# Patient Record
Sex: MALE | Race: WHITE | NOT HISPANIC OR LATINO | Employment: OTHER | URBAN - METROPOLITAN AREA
[De-identification: names, ages, dates, MRNs, and addresses within clinical notes are randomized per-mention and may not be internally consistent; named-entity substitution may affect disease eponyms.]

---

## 2017-01-11 ENCOUNTER — TELEPHONE (OUTPATIENT)
Dept: INTERNAL MEDICINE | Facility: CLINIC | Age: 72
End: 2017-01-11

## 2017-01-11 RX ORDER — MUPIROCIN 20 MG/G
OINTMENT TOPICAL 2 TIMES DAILY
Qty: 60 G | Refills: 0 | Status: SHIPPED | OUTPATIENT
Start: 2017-01-11 | End: 2017-05-16 | Stop reason: SDUPTHER

## 2017-01-11 RX ORDER — MUPIROCIN 20 MG/G
OINTMENT TOPICAL
Qty: 22 G | Refills: 0 | Status: SHIPPED | OUTPATIENT
Start: 2017-01-11 | End: 2017-05-23

## 2017-01-11 NOTE — TELEPHONE ENCOUNTER
----- Message from Coy Fuller sent at 1/11/2017  3:33 PM CST -----  Contact: Sari Wife [phone]  Type: Rx    Name of medication(s): mupirocin (BACTROBAN) 2 % ointment    Is this a refill? New rx? Refill    Who prescribed medication? Dr Hood    Pharmacy Name, Phone, & Location: [facility]     Comments:Sari stated that the above pt had a fall and have some pretty bad scrapes on him, please advice

## 2017-01-12 ENCOUNTER — OFFICE VISIT (OUTPATIENT)
Dept: INTERNAL MEDICINE | Facility: CLINIC | Age: 72
End: 2017-01-12
Payer: MEDICARE

## 2017-01-12 VITALS
HEART RATE: 76 BPM | BODY MASS INDEX: 42.66 KG/M2 | WEIGHT: 315 LBS | SYSTOLIC BLOOD PRESSURE: 152 MMHG | OXYGEN SATURATION: 95 % | HEIGHT: 72 IN | TEMPERATURE: 98 F | DIASTOLIC BLOOD PRESSURE: 82 MMHG

## 2017-01-12 DIAGNOSIS — L03.116 CELLULITIS OF LEFT LOWER EXTREMITY: Primary | ICD-10-CM

## 2017-01-12 DIAGNOSIS — I87.2 CHRONIC VENOUS INSUFFICIENCY: ICD-10-CM

## 2017-01-12 DIAGNOSIS — I10 ESSENTIAL HYPERTENSION: ICD-10-CM

## 2017-01-12 PROCEDURE — 1159F MED LIST DOCD IN RCRD: CPT | Mod: S$GLB,,, | Performed by: INTERNAL MEDICINE

## 2017-01-12 PROCEDURE — 99999 PR PBB SHADOW E&M-EST. PATIENT-LVL III: CPT | Mod: PBBFAC,,, | Performed by: INTERNAL MEDICINE

## 2017-01-12 PROCEDURE — 99499 UNLISTED E&M SERVICE: CPT | Mod: S$GLB,,, | Performed by: INTERNAL MEDICINE

## 2017-01-12 PROCEDURE — 3079F DIAST BP 80-89 MM HG: CPT | Mod: S$GLB,,, | Performed by: INTERNAL MEDICINE

## 2017-01-12 PROCEDURE — 3077F SYST BP >= 140 MM HG: CPT | Mod: S$GLB,,, | Performed by: INTERNAL MEDICINE

## 2017-01-12 PROCEDURE — 99214 OFFICE O/P EST MOD 30 MIN: CPT | Mod: 25,S$GLB,, | Performed by: INTERNAL MEDICINE

## 2017-01-12 PROCEDURE — 1125F AMNT PAIN NOTED PAIN PRSNT: CPT | Mod: S$GLB,,, | Performed by: INTERNAL MEDICINE

## 2017-01-12 PROCEDURE — 96372 THER/PROPH/DIAG INJ SC/IM: CPT | Mod: S$GLB,,, | Performed by: INTERNAL MEDICINE

## 2017-01-12 PROCEDURE — 1157F ADVNC CARE PLAN IN RCRD: CPT | Mod: S$GLB,,, | Performed by: INTERNAL MEDICINE

## 2017-01-12 PROCEDURE — 1160F RVW MEDS BY RX/DR IN RCRD: CPT | Mod: S$GLB,,, | Performed by: INTERNAL MEDICINE

## 2017-01-12 RX ORDER — CEFTRIAXONE 1 G/1
1 INJECTION, POWDER, FOR SOLUTION INTRAMUSCULAR; INTRAVENOUS
Status: COMPLETED | OUTPATIENT
Start: 2017-01-12 | End: 2017-01-12

## 2017-01-12 RX ORDER — FUROSEMIDE 40 MG/1
40 TABLET ORAL DAILY
Qty: 7 TABLET | Refills: 0 | Status: SHIPPED | OUTPATIENT
Start: 2017-01-12 | End: 2017-03-13

## 2017-01-12 RX ADMIN — CEFTRIAXONE 1 G: 1 INJECTION, POWDER, FOR SOLUTION INTRAMUSCULAR; INTRAVENOUS at 10:01

## 2017-01-12 NOTE — PROGRESS NOTES
Verified patients name and date of birth, verified patient allergies, instructed patient to remain in building for 15 min, patient acknowledged understanding of instructions, patient tolerated injection well.

## 2017-01-12 NOTE — PROGRESS NOTES
Patient, Jake Louie (MRN #[id number]), presented with a recorded BMI of 43.29 kg/m^2 consistent with the definition of morbid obesity (ICD-10 E66.01). The patient's morbid obesity was monitored, evaluated, addressed and/or treated. This addendum to the medical record is made on 01/12/2017.

## 2017-01-12 NOTE — PROGRESS NOTES
REASON FOR VISIT:  This is a 71-year-old male who two days ago dropped his keys,   bent down, lost his balance, and landed on his left side which caused an   abrasion involving his left leg.  He is now seeing more swelling involving the   left leg with redness.  He has a history of chronic venous insufficiency of the   legs with recurrent edema and cellulitis.  He has responded well to additional   diuretics or antibiotics.    PAST MEDICAL HISTORY:  Hypertension.  Complex sleep apnea.  Hyperlipidemia.  Chronic insomnia.  Chronic venous insufficiency with reflux.    SOCIAL HISTORY:  Tobacco use, none.    MEDICATIONS:  List as per Preisbockd.  In reference to the last visit, he stopped   trazodone because it was not effective.    PHYSICAL EXAMINATION:  VITAL SIGNS:  Weight is 319, pulse 72, blood pressure by me 152/82.  He did not   take any medicines this morning.  EXTREMITIES:  He has 2+ pedal pulses in the right leg.  He has chronic 1+ edema.    On the left leg there is a surgical scar; he had a previous fracture.  He has   3+ edema.  There are about 3 abrasions on the lateral upper part of his lower   leg with surrounding erythema and there is one in the anterior aspect with   surrounding erythema.  There are also blisterous clear vesicles developing.    IMPRESSION:  1.  Cellulitis.  2.  Chronic venous insufficiency with acute edema.    PLAN:  Rocephin 1 g IM; in the past this was very effective in knocking it out   right away, but in addition, we will give a prescription of Lasix 40 mg once a   day for the next week.    /sc [id number] review      JAM/SILVIO  dd: 01/12/2017 09:53:37 (CST)  td: 01/13/2017 03:55:53 (CST)  Doc ID   #[id number]  Job ID #[id number]    CC:

## 2017-01-12 NOTE — MR AVS SNAPSHOT
[facility] [address]  [address]  [address]  [facility] [address]  Phone: [phone]                  Jake Louie   2017 9:30 AM   Office Visit    Description:  Male : 1945   Provider:  Artem Hood MD   Department:  [facility] [address]           Reason for Visit     Wound Infection           Diagnoses this Visit        Comments    Cellulitis of left lower extremity    -  Primary     Chronic venous insufficiency         Essential hypertension                To Do List           Future Appointments        Provider Department Dept Phone    2017 8:30 AM LAB, [place] [facility] [phone]    6/15/2017 8:40 AM Harpreet Rosario MD [facility] [place] [facility] [phone]      Goals (5 Years of Data)     None       These Medications        Disp Refills Start End    furosemide (LASIX) 40 MG tablet 7 tablet 0 2017    Take 1 tablet (40 mg total) by mouth once daily. - Oral    Pharmacy: [facility] [address] [place] [address] AT [facility] #: [phone]         [facility]     [facility] Nurse Care [facility] -  Assistance  Registered nurses in the [facility] provide clinical advisement, health education, appointment booking, and other advisory services.  Call for this free service at [phone].             Medications           Message regarding Medications     Verify the changes and/or additions to your medication regime listed below are the same as discussed with your clinician today.  If any of these changes or additions are incorrect, please notify your healthcare provider.        START taking these NEW medications        Refills    furosemide (LASIX) 40 MG tablet 0    Sig: Take 1 tablet (40 mg total) by mouth once daily.    Class: Normal    Route: Oral      These medications were administered today        Dose Freq    cefTRIAXone  injection 1 g 1 g Clinic/HOD 1 time    Sig: Inject 1 g into the muscle one time.    Class: Normal    Route: Intramuscular      STOP taking these medications     trazodone (DESYREL) 50 MG tablet Take 1 tablet (50 mg total) by mouth every evening.           Verify that the below list of medications is an accurate representation of the medications you are currently taking.  If none reported, the list may be blank. If incorrect, please contact your healthcare provider. Carry this list with you in case of emergency.           Current Medications     ascorbic acid (VITAMIN C) 500 MG tablet Take 500 mg by mouth once daily.    furosemide (LASIX) 40 MG tablet Take 1 tablet (40 mg total) by mouth once daily.    lisinopril-hydrochlorothiazide (PRINZIDE,ZESTORETIC) 20-25 mg Tab TAKE ONE TABLET BY MOUTH ONCE DAILY    multivitamin (THERAGRAN) per tablet Take 1 tablet by mouth once daily.      mupirocin (BACTROBAN) 2 % ointment APPLY TO AFFECTED AREA(S) TWO TIMES A DAY    mupirocin (BACTROBAN) 2 % ointment Apply topically 2 (two) times daily.    nicotine (NICODERM CQ) 14 mg/24 hr Place 1 patch onto the skin once daily.    nicotine polacrilex 2 MG Lozg Nicorette Mini Lozenge -one 2 mg lozenge by mouth as needed, max 5 in 6 hr period.    pravastatin (PRAVACHOL) 20 MG tablet Take 1 tablet (20 mg total) by mouth once daily.    vitamin D 1000 units Tab Take 185 mg by mouth once daily.           Clinical Reference Information           Vital Signs - Last Recorded  Most recent update: 1/12/2017  9:50 AM by Artem Hood MD    BP Pulse Temp Ht Wt SpO2    (!) 152/82 76 98 °F (36.7 °C) (Oral) 6' (1.829 m) (!) 144.8 kg (319 lb 3.2 oz) 95%    BMI                43.29 kg/m2          Blood Pressure          Most Recent Value    BP  (!)  152/82      Allergies as of 1/12/2017     No Known Allergies      Immunizations Administered on Date of Encounter - 1/12/2017     None      Administrations This Visit     cefTRIAXone injection 1 g     Admin  Date Action Dose Route Administered By             01/12/2017 Given 1 g Intramuscular Malgorzata Singh LPN                      Smoking Cessation     If you would like to quit smoking:   You may be eligible for free services if you are a [place] resident and started smoking cigarettes before September 1, 1988.  Call the Smoking Cessation Trust (SCT) toll free at (349) 750-8709 or (702) 051-9847.   Call 1-800-QUIT-NOW if you do not meet the above criteria.

## 2017-01-13 ENCOUNTER — TELEPHONE (OUTPATIENT)
Dept: INTERNAL MEDICINE | Facility: CLINIC | Age: 72
End: 2017-01-13

## 2017-01-13 RX ORDER — SULFAMETHOXAZOLE AND TRIMETHOPRIM 800; 160 MG/1; MG/1
1 TABLET ORAL 2 TIMES DAILY
Qty: 20 TABLET | Refills: 0 | Status: SHIPPED | OUTPATIENT
Start: 2017-01-13 | End: 2017-01-23

## 2017-01-13 NOTE — TELEPHONE ENCOUNTER
----- Message from Ankita Burnett sent at 1/12/2017  4:18 PM CST -----  Contact: wife sha   [phone]  Pt 's wife is calling in regards for appt today about leg, she would like some adivce on how to treat it and what she should be doing for her

## 2017-01-13 NOTE — TELEPHONE ENCOUNTER
----- Message from Coy Fuller sent at 1/12/2017  2:43 PM CST -----  Contact: Sari Wife [phone]  Sari is requesting a call back regarding the Cellulitis on the back of her 's left leg, please call and advice    Thanks

## 2017-01-25 ENCOUNTER — TELEPHONE (OUTPATIENT)
Dept: SLEEP MEDICINE | Facility: CLINIC | Age: 72
End: 2017-01-25

## 2017-01-25 NOTE — TELEPHONE ENCOUNTER
----- Message from Malgorzata Luizarene sent at 1/25/2017  1:41 PM CST -----  _  1st Request  _  2nd Request  _ x 3rd Request        Who: Pt  Why: He states that it's very important that he speak to the nurse about his C-pap supplies. Please call him     What Number to Call Back:[phone]    When to Expect a call back: (Before the end of the day)   -- if call after 3:00 call back will be tomorrow.

## 2017-01-25 NOTE — TELEPHONE ENCOUNTER
Called pt and he is very upset that the DME at [facility] tried to give him an Silvia small gel full face mask. He uses the [employer] True Blue CPAP face mask. He has had repeated problems with this location. He no longer wants to use this DME company for his CPAP supplies. He wants Dr Rosario to be aware.

## 2017-01-26 NOTE — TELEPHONE ENCOUNTER
If he feels that he needs to switch DME, I would be happy to provide his prescription to another vendor.    Does he have another DME company in mind?    I can also contact the manager at [facility] and ask that he personally work through his concerns.    Please just let me know.  Dr. Rosario

## 2017-01-26 NOTE — TELEPHONE ENCOUNTER
Called pt and he is going to go to [facility]'s office in his area and let us know which DME is covered by his insurance before he switches.    Pt called the  of the DME company at [facility] and filed a formal complaint for the problems that he has been having. He is going to wait to hear from after he makes me the formal complaint before he goes any further with them.

## 2017-03-01 ENCOUNTER — NURSE TRIAGE (OUTPATIENT)
Dept: ADMINISTRATIVE | Facility: CLINIC | Age: 72
End: 2017-03-01

## 2017-03-01 ENCOUNTER — HOSPITAL ENCOUNTER (EMERGENCY)
Facility: HOSPITAL | Age: 72
Discharge: HOME | End: 2017-03-01
Attending: EMERGENCY MEDICINE
Payer: MEDICARE

## 2017-03-01 VITALS
HEIGHT: 71 IN | OXYGEN SATURATION: 96 % | HEART RATE: 86 BPM | WEIGHT: 310 LBS | SYSTOLIC BLOOD PRESSURE: 130 MMHG | RESPIRATION RATE: 18 BRPM | TEMPERATURE: 98 F | DIASTOLIC BLOOD PRESSURE: 86 MMHG | BODY MASS INDEX: 43.4 KG/M2

## 2017-03-01 DIAGNOSIS — R06.09 DOE (DYSPNEA ON EXERTION): Primary | ICD-10-CM

## 2017-03-01 LAB
ALBUMIN SERPL BCP-MCNC: 3.3 G/DL
ALP SERPL-CCNC: 89 U/L
ALT SERPL W/O P-5'-P-CCNC: 25 U/L
ANION GAP SERPL CALC-SCNC: 7 MMOL/L
AST SERPL-CCNC: 17 U/L
BASOPHILS # BLD AUTO: 0.02 K/UL
BASOPHILS NFR BLD: 0.3 %
BILIRUB SERPL-MCNC: 0.3 MG/DL
BNP SERPL-MCNC: 16 PG/ML
BUN SERPL-MCNC: 19 MG/DL
CALCIUM SERPL-MCNC: 9.1 MG/DL
CHLORIDE SERPL-SCNC: 107 MMOL/L
CO2 SERPL-SCNC: 25 MMOL/L
CREAT SERPL-MCNC: 0.9 MG/DL
DIFFERENTIAL METHOD: ABNORMAL
EOSINOPHIL # BLD AUTO: 0.1 K/UL
EOSINOPHIL NFR BLD: 1.9 %
ERYTHROCYTE [DISTWIDTH] IN BLOOD BY AUTOMATED COUNT: 15.5 %
EST. GFR  (AFRICAN AMERICAN): >60 ML/MIN/1.73 M^2
EST. GFR  (NON AFRICAN AMERICAN): >60 ML/MIN/1.73 M^2
GLUCOSE SERPL-MCNC: 88 MG/DL
HCT VFR BLD AUTO: 44.7 %
HGB BLD-MCNC: 15 G/DL
INR PPP: 1.1
LYMPHOCYTES # BLD AUTO: 2.1 K/UL
LYMPHOCYTES NFR BLD: 27.9 %
MCH RBC QN AUTO: 28.2 PG
MCHC RBC AUTO-ENTMCNC: 33.6 %
MCV RBC AUTO: 84 FL
MONOCYTES # BLD AUTO: 0.8 K/UL
MONOCYTES NFR BLD: 10.6 %
NEUTROPHILS # BLD AUTO: 4.4 K/UL
NEUTROPHILS NFR BLD: 59.2 %
PLATELET # BLD AUTO: 280 K/UL
PMV BLD AUTO: 9.3 FL
POTASSIUM SERPL-SCNC: 4.3 MMOL/L
PROT SERPL-MCNC: 7.2 G/DL
PROTHROMBIN TIME: 11.2 SEC
RBC # BLD AUTO: 5.31 M/UL
SODIUM SERPL-SCNC: 139 MMOL/L
TROPONIN I SERPL DL<=0.01 NG/ML-MCNC: <0.006 NG/ML
WBC # BLD AUTO: 7.48 K/UL

## 2017-03-01 PROCEDURE — 36000 PLACE NEEDLE IN VEIN: CPT

## 2017-03-01 PROCEDURE — 83880 ASSAY OF NATRIURETIC PEPTIDE: CPT

## 2017-03-01 PROCEDURE — 93005 ELECTROCARDIOGRAM TRACING: CPT

## 2017-03-01 PROCEDURE — 85610 PROTHROMBIN TIME: CPT

## 2017-03-01 PROCEDURE — 84484 ASSAY OF TROPONIN QUANT: CPT

## 2017-03-01 PROCEDURE — 85025 COMPLETE CBC W/AUTO DIFF WBC: CPT

## 2017-03-01 PROCEDURE — 99284 EMERGENCY DEPT VISIT MOD MDM: CPT | Mod: 25

## 2017-03-01 PROCEDURE — 80053 COMPREHEN METABOLIC PANEL: CPT

## 2017-03-01 PROCEDURE — 99284 EMERGENCY DEPT VISIT MOD MDM: CPT | Mod: ,,, | Performed by: EMERGENCY MEDICINE

## 2017-03-01 PROCEDURE — 93010 ELECTROCARDIOGRAM REPORT: CPT | Mod: ,,, | Performed by: INTERNAL MEDICINE

## 2017-03-01 NOTE — ED AVS SNAPSHOT
[facility]  [address]  [facility] [address]               Jake Louie   3/1/2017  1:02 PM   ED    Description:  Male : 1945   Department:  [facility]           Your Care was Coordinated By:     Provider Role From To    Connor Kendrick MD Attending Provider 17 1314 --    Mila Musa PA-C Physician Assistant 17 1314 --      Reason for Visit     URI           Diagnoses this Visit        Comments    MUJICA (dyspnea on exertion)    -  Primary       ED Disposition     ED Disposition Condition Comment    Discharge             To Do List           Follow-up Information     Follow up with Artem Hood MD. Schedule an appointment as soon as possible for a visit in 3 days.    Specialty:  Internal Medicine    Why:  For reevaluation    Contact information:    [address]  [facility] [address]  [phone]        [facility]     [facility] Nurse Care [facility] -  Assistance  Registered nurses in the [facility] provide clinical advisement, health education, appointment booking, and other advisory services.  Call for this free service at [phone].             Medications           Message regarding Medications     Verify the changes and/or additions to your medication regime listed below are the same as discussed with your clinician today.  If any of these changes or additions are incorrect, please notify your healthcare provider.             Verify that the below list of medications is an accurate representation of the medications you are currently taking.  If none reported, the list may be blank. If incorrect, please contact your healthcare provider. Carry this list with you in case of emergency.           Current Medications     ascorbic acid (VITAMIN C) 500 MG tablet Take 500 mg by mouth once daily.    furosemide (LASIX) 40 MG tablet Take 1 tablet (40 mg total) by mouth once daily.     lisinopril-hydrochlorothiazide (PRINZIDE,ZESTORETIC) 20-25 mg Tab TAKE ONE TABLET BY MOUTH ONCE DAILY    multivitamin (THERAGRAN) per tablet Take 1 tablet by mouth once daily.      mupirocin (BACTROBAN) 2 % ointment APPLY TO AFFECTED AREA(S) TWO TIMES A DAY    pravastatin (PRAVACHOL) 20 MG tablet Take 1 tablet (20 mg total) by mouth once daily.    vitamin D 1000 units Tab Take 185 mg by mouth once daily.    nicotine (NICODERM CQ) 14 mg/24 hr Place 1 patch onto the skin once daily.    nicotine polacrilex 2 MG Lozg Nicorette Mini Lozenge -one 2 mg lozenge by mouth as needed, max 5 in 6 hr period.           Clinical Reference Information           Your Vitals Were     BP                   130/86           Allergies as of 3/1/2017     No Known Allergies      Immunizations Administered on Date of Encounter - 3/1/2017     None      ED Micro, Lab, POCT     Start Ordered       Status Ordering Provider    03/01/17 1329 03/01/17 1330  CBC auto differential  STAT      Final result     03/01/17 1329 03/01/17 1330  Comprehensive metabolic panel  STAT      Final result     03/01/17 1329 03/01/17 1330  Troponin I  STAT      Final result     03/01/17 1329 03/01/17 1330  Brain natriuretic peptide  STAT      Final result     03/01/17 1329 03/01/17 1330  Protime-INR  Once      Final result       ED Imaging Orders     Start Ordered       Status Ordering Provider    03/01/17 1330 03/01/17 1330  X-Ray Chest PA And Lateral  1 time imaging      Final result         Discharge Instructions         Your blood work today and chest x-ray did not show any emergent issues with your heart or lungs.  Follow up with your PCP in 3-4 days if your symptoms do not improve.  Shortness of Breath (Dyspnea)  Shortness of breath is the feeling that you can't catch your breath or get enough air. It is also known as dyspnea.  Dyspnea can be caused by many different conditions. They include:  · Acute asthma attack.   · Worsening of chronic lung diseases such as  chronic bronchitis and emphysema.  · Heart failure. This is when weak heart muscle allows extra fluid to collect in the lungs.  · Panic attacks or anxiety. Fear can cause rapid breathing (hyperventilation).  · Pneumonia, or an infection in the lung tissue.  · Exposure to toxic substances, fumes, smoke, or certain medicines.  · Blood clot in the lung (pulmonary embolism). This is often from a piece of blood clot in a deep vein of the leg (deep vein thrombosis) that breaks off and travels to the lungs.  · Heart attack or heart-related chest pain (angina).  · Anemia.  · Collapsed lung (pneumothorax).  · Dehydration.  · Pregnancy.  Based on your visit today, the exact cause of your shortness of breath is not certain. Your tests dont show any of the serious causes of dyspnea. You may need other tests to find out if you have a serious problem. Its important to watch for any new symptoms or symptoms that get worse. Follow up with your healthcare provider as directed.  Home care  Follow these tips to take care of yourself at home:  · When your symptoms are better, go back to your usual activities.  · If you smoke, you should stop. Join a quit-smoking program or ask your healthcare provider for help.  · Eat a healthy diet and get plenty of sleep.  · Get regular exercise. Talk with your healthcare provider before starting to exercise, especially if you have other medical problems.  · Cut down on the amount of caffeine and stimulants you consume.  Follow-up care  Follow up with your healthcare provider, or as advised.  If tests were done, you will be told if your treatment needs to be changed. You can call as directed for the results.  (Note: If an X-ray was taken, a specialist will review it. You will be notified of any new findings that may affect your care.)  Call 911 or get immediate medical care  Shortness of breath may be a sign of a serious medical problem. For example, it may be a problem with your heart or lungs.  Call 911 if you have worsening shortness of breath or trouble breathing, especially with any of the symptoms below:  · You are confused or its difficult to wake you.  · You faint or lose consciousness.  · You have a fast heartbeat, or your heartbeat is irregular.  · You are coughing up blood.  · You have pain in your chest, arm, shoulder, neck, or upper back.  · You break out in a sweat.  When to seek medical advice  Call your healthcare provider right away if any of these occur:  · Slight shortness of breath or wheezing  · Redness, pain or swelling in your leg, arm, or other body area  · Swelling in both legs or ankles  · Fast weight gain  · Dizziness or weakness  · Fever of 100.4ºF (38ºC) or higher, or as directed by your healthcare provider  Date Last Reviewed: 9/13/2015  © 9138-0249 [employer]. [address] [place] [address]. All rights reserved. This information is not intended as a substitute for professional medical care. Always follow your healthcare professional's instructions.          Your Scheduled Appointments     Mar 07, 2017 11:00 AM CST   Urgent Care with Artem Hood MD   [facility] [address] ([place]    [address]  [address]  [facility] [address]   [phone]            May 17, 2017  8:30 AM CDT   Fasting Lab with LAB, [place] [facility] ([place])    [address]  [facility] [address]   [phone]            Armaan 15, 2017  8:40 AM CDT   Established Patient Visit with Harpreet Rosario MD   [facility] [place]    [address] [facility] [address]  [facility] [address] [phone]              Smoking Cessation     If you would like to quit smoking:   You may be eligible for free services if you are a [place] resident and started smoking cigarettes before September 1, 1988.  Call the Smoking Cessation Trust (SCT) toll free at (859) 478-5416 or (578) 942-8850.   Call  1-800-QUIT-NOW if you do not meet the above criteria.             [facility] complies with applicable Federal civil rights laws and does not discriminate on the basis of race, color, national origin, age, disability, or sex.        Language Assistance Services     ATTENTION: Language assistance services are available, free of charge. Please call [phone].      ATENCIÓN: Si habla español, tiene a yen disposición servicios gratuitos de asistencia lingüística. Llame al [phone].     CHÚ Ý: N?u b?n nói Ti?ng Vi?t, có các d?ch v? h? tr? ngôn ng? mi?n phí dành cho b?n. G?i s? [phone].

## 2017-03-01 NOTE — DISCHARGE INSTRUCTIONS
Your blood work today and chest x-ray did not show any emergent issues with your heart or lungs.  Follow up with your PCP in 3-4 days if your symptoms do not improve.  Shortness of Breath (Dyspnea)  Shortness of breath is the feeling that you can't catch your breath or get enough air. It is also known as dyspnea.  Dyspnea can be caused by many different conditions. They include:  · Acute asthma attack.   · Worsening of chronic lung diseases such as chronic bronchitis and emphysema.  · Heart failure. This is when weak heart muscle allows extra fluid to collect in the lungs.  · Panic attacks or anxiety. Fear can cause rapid breathing (hyperventilation).  · Pneumonia, or an infection in the lung tissue.  · Exposure to toxic substances, fumes, smoke, or certain medicines.  · Blood clot in the lung (pulmonary embolism). This is often from a piece of blood clot in a deep vein of the leg (deep vein thrombosis) that breaks off and travels to the lungs.  · Heart attack or heart-related chest pain (angina).  · Anemia.  · Collapsed lung (pneumothorax).  · Dehydration.  · Pregnancy.  Based on your visit today, the exact cause of your shortness of breath is not certain. Your tests dont show any of the serious causes of dyspnea. You may need other tests to find out if you have a serious problem. Its important to watch for any new symptoms or symptoms that get worse. Follow up with your healthcare provider as directed.  Home care  Follow these tips to take care of yourself at home:  · When your symptoms are better, go back to your usual activities.  · If you smoke, you should stop. Join a quit-smoking program or ask your healthcare provider for help.  · Eat a healthy diet and get plenty of sleep.  · Get regular exercise. Talk with your healthcare provider before starting to exercise, especially if you have other medical problems.  · Cut down on the amount of caffeine and stimulants you consume.  Follow-up care  Follow up with  your healthcare provider, or as advised.  If tests were done, you will be told if your treatment needs to be changed. You can call as directed for the results.  (Note: If an X-ray was taken, a specialist will review it. You will be notified of any new findings that may affect your care.)  Call 911 or get immediate medical care  Shortness of breath may be a sign of a serious medical problem. For example, it may be a problem with your heart or lungs. Call 911 if you have worsening shortness of breath or trouble breathing, especially with any of the symptoms below:  · You are confused or its difficult to wake you.  · You faint or lose consciousness.  · You have a fast heartbeat, or your heartbeat is irregular.  · You are coughing up blood.  · You have pain in your chest, arm, shoulder, neck, or upper back.  · You break out in a sweat.  When to seek medical advice  Call your healthcare provider right away if any of these occur:  · Slight shortness of breath or wheezing  · Redness, pain or swelling in your leg, arm, or other body area  · Swelling in both legs or ankles  · Fast weight gain  · Dizziness or weakness  · Fever of 100.4ºF (38ºC) or higher, or as directed by your healthcare provider  Date Last Reviewed: 9/13/2015 © 2000-2016 [employer]. [address] [place] [address], [place] [address]. All rights reserved. This information is not intended as a substitute for professional medical care. Always follow your healthcare professional's instructions.

## 2017-03-01 NOTE — PROVIDER PROGRESS NOTES - EMERGENCY DEPT.
Encounter Date: 3/1/2017    ED Physician Progress Notes       SCRIBE NOTE: I, Lex Cotton, am scribing for, and in the presence of,  Dr. Nazario.  Physician Statement: I, Dr. Nazario, personally performed the services described in this documentation as scribed by Lex Cotton in my presence, and it is both accurate and complete.     Physician Note:   EKG: sinus rhythm, rate of 80, no acute ST T changes, no ectopy, no STEMI    EKG - STEMI Decision  Initial Reading: No STEMI present.

## 2017-03-01 NOTE — ED NOTES
Patient here c/o shortness of breath on exertion onset yesterday while walking during parades.  Pt in no acute distress, respirations even and slightly labored, AA&Ox3

## 2017-03-01 NOTE — ED PROVIDER NOTES
Encounter Date: 3/1/2017       History     Chief Complaint   Patient presents with    URI     been having cough , congestion 4 days, cont with sob     Review of patient's allergies indicates:  No Known Allergies  HPI Comments: 71-year-old male with a PMH of HTN, HLD, severe JUICE presents to the ED with a chief complaint of shortness of breath.  Patient reports MUJICA yesterday while walking around the [facility].  Patient states that he was unable to walk past more than 1 house stoop without stopping to rest.  Patient states that this is a new problem.  Except for the past 2 months, patient states that he was swimming about 60 laps every day and walking 5-10 miles per day.  He reports a recent cold with a productive cough of white sputum, nasal congestion - the symptoms have improved.  Patient states that he has an adjustable bed and sleeps with the head of bed elevated for his sleep apnea.  Patient denies fever, chills, chest pain, worsening leg swelling, abdominal pain, nausea, vomiting.     The history is provided by the patient.     Past Medical History:   Diagnosis Date    Depression     HEARING LOSS     Hyperlipidemia     Hypertension     JUICE (obstructive sleep apnea)     Personal history of colonic polyps     Restless legs syndrome (RLS)     Sleep apnea      Past Surgical History:   Procedure Laterality Date    KNEE SURGERY      KNEE SURGERY      leg fx  repair     Family History   Problem Relation Age of Onset    Arthritis Mother     Hypertension Mother     Hearing loss Mother     Pneumonia Mother     Parkinsonism Father     Hypertension Brother     Hypertension Brother     Hypertension Brother      Social History   Substance Use Topics    Smoking status: Current Every Day Smoker     Packs/day: 0.50     Years: 50.00    Smokeless tobacco: Never Used    Alcohol use No     Review of Systems   Constitutional: Negative for appetite change, chills and fever.   HENT: Negative for sore throat.     Respiratory: Positive for shortness of breath. Negative for cough.    Cardiovascular: Negative for chest pain and leg swelling.   Gastrointestinal: Negative for nausea.   Genitourinary: Negative for dysuria.   Musculoskeletal: Negative for myalgias.   Skin: Negative for rash.   Neurological: Negative for weakness.   Psychiatric/Behavioral: Negative for confusion.       Physical Exam   Initial Vitals   BP Pulse Resp Temp SpO2   03/01/17 1103 03/01/17 1103 03/01/17 1103 03/01/17 1103 03/01/17 1103   130/86 86 18 98.1 °F (36.7 °C) 96 %     Physical Exam    Constitutional: He appears well-developed and well-nourished. He is not diaphoretic. He is Obese .  Non-toxic appearance. He does not appear ill. No distress.   HENT:   Head: Normocephalic and atraumatic.   Eyes: EOM are normal. Right eye exhibits no discharge. Left eye exhibits no discharge.   Neck: Normal range of motion and phonation normal. Neck supple.   Cardiovascular: Normal rate and regular rhythm. Exam reveals no gallop, no distant heart sounds and no friction rub.    No murmur heard.  2+ edema to bilateral lower extremities   Pulmonary/Chest: Effort normal and breath sounds normal. No accessory muscle usage. No tachypnea. No respiratory distress. He has no decreased breath sounds. He has no wheezes. He has no rhonchi. He has no rales.   Abdominal: Soft. Normal appearance. He exhibits no distension.   Protuberant abdomen   Musculoskeletal: Normal range of motion.   Neurological: He is alert and oriented to person, place, and time.   Skin: Skin is warm and dry. No rash noted. No pallor.   Psychiatric: He has a normal mood and affect. His behavior is normal. Judgment and thought content normal.         ED Course   Procedures  Labs Reviewed   CBC W/ AUTO DIFFERENTIAL - Abnormal; Notable for the following:        Result Value    RDW 15.5 (*)     All other components within normal limits   COMPREHENSIVE METABOLIC PANEL - Abnormal; Notable for the following:      Albumin 3.3 (*)     Anion Gap 7 (*)     All other components within normal limits   TROPONIN I   B-TYPE NATRIURETIC PEPTIDE   PROTIME-INR             Medical Decision Making:   History:   Old Medical Records: I decided to obtain old medical records.  Differential Diagnosis:   ACS, CHF, pneumonia, URI  Clinical Tests:   Lab Tests: Ordered and Reviewed  Radiological Study: Reviewed and Ordered  Medical Tests: Reviewed and Ordered       APC / Resident Notes:   MDM:  71-year-old male presents for evaluation of MUJICA yesterday.  His vitals are within normal limits.  O2 sats 96% on RA.  The lungs are clear to auscultation bilaterally.  Cardiac exam is unremarkable.  There is 2+ edema noted to bilateral lower extremities.  Patient states that this is baseline.    Lab work including CBC, CMP, troponin, BNP were unremarkable.  Chest x-ray reveals no acute abnormalities.  EKG without evidence of acute ischemia or life-threatening arrhythmia.    Patient is clinically stable for discharge.  He is instructed to follow up with his PCP for reevaluation if no improvement in his symptoms. ED warnings and return precautions given.  I have reviewed the patient's records and discussed this case with my supervising physician.                ED Course     Clinical Impression:   The encounter diagnosis was MUJICA (dyspnea on exertion).    Disposition:   Disposition: Discharged  Condition: Stable  Dictation #1  MRN:[id number]  CSN:[id number]       Mila Musa PA-C  03/01/17 3662    ATTENDING PHYSICIAN ATTESTATION  I have repeated the key portions of the NP/PA's history and physical (face to face), reviewed and agree with the NP/PA's medical documentation, and supervised and managed the medical care of the patient.  Additionally, I was present for the critical portion of any procedure(s) performed.    All systems were reviewed and were negative except as noted in the HPI.      Almas Kendrick MD, INEZ, [facility]  Department of Emergency  Medicine    Medical Decision Making:    I reviewed and interpreted the ECG and any monitoring strips.  I reviewed radiology personally along with interpretations.  I reviewed and interpreted the laboratory results.    Almas Kendrick MD, INEZ, CPE, FACEP  Department of Emergency Medicine  , [facility]      Evaluation for Emergency Medical Condition  The patient received a medical screening exam and within a reasonable degree of clinical confidence an emergency medical condition has not been identified.  The patient is instructed on proper follow up and return precautions to the ED.         Connor Kendrick MD  03/26/17 4323

## 2017-03-01 NOTE — TELEPHONE ENCOUNTER
Reason for Disposition   MODERATE difficulty breathing (e.g., speaks in phrases, SOB even at rest, pulse 100-120) of new onset or worse than normal    Protocols used: ST BREATHING DIFFICULTY-A-OH    Patient started to become out of breath on yesterday as he was walking.  Patient denies chest pains or any other symptoms besides becoming winded.  Patient would like to see his PCP today, but he is out of the office until Monday.  Advised patient to go to ER for further evaluation.

## 2017-03-01 NOTE — TELEPHONE ENCOUNTER
Reason for Disposition   Caller has cancelled the call before the first contact.    Protocols used: ST NO CONTACT OR DUPLICATE CONTACT CALL-A-AH

## 2017-03-02 ENCOUNTER — TELEPHONE (OUTPATIENT)
Dept: INTERNAL MEDICINE | Facility: CLINIC | Age: 72
End: 2017-03-02

## 2017-03-06 ENCOUNTER — TELEPHONE (OUTPATIENT)
Dept: SMOKING CESSATION | Facility: CLINIC | Age: 72
End: 2017-03-06

## 2017-03-06 NOTE — TELEPHONE ENCOUNTER
3/6/17    11:10 a.m.    Returned patient's voice mail message.  Left a voice mail message with the number to call to schedule an updated assessment since he had not been in the Clinic since 7/2016.

## 2017-03-10 ENCOUNTER — TELEPHONE (OUTPATIENT)
Dept: SMOKING CESSATION | Facility: CLINIC | Age: 72
End: 2017-03-10

## 2017-03-13 ENCOUNTER — OFFICE VISIT (OUTPATIENT)
Dept: INTERNAL MEDICINE | Facility: CLINIC | Age: 72
End: 2017-03-13
Payer: MEDICARE

## 2017-03-13 VITALS
BODY MASS INDEX: 44.1 KG/M2 | HEIGHT: 71 IN | SYSTOLIC BLOOD PRESSURE: 119 MMHG | DIASTOLIC BLOOD PRESSURE: 74 MMHG | WEIGHT: 315 LBS | HEART RATE: 84 BPM

## 2017-03-13 DIAGNOSIS — K05.10 GINGIVITIS: ICD-10-CM

## 2017-03-13 DIAGNOSIS — K04.7 TOOTH INFECTION: Primary | ICD-10-CM

## 2017-03-13 PROCEDURE — 1125F AMNT PAIN NOTED PAIN PRSNT: CPT | Mod: S$GLB,,, | Performed by: INTERNAL MEDICINE

## 2017-03-13 PROCEDURE — 3074F SYST BP LT 130 MM HG: CPT | Mod: S$GLB,,, | Performed by: INTERNAL MEDICINE

## 2017-03-13 PROCEDURE — 1159F MED LIST DOCD IN RCRD: CPT | Mod: S$GLB,,, | Performed by: INTERNAL MEDICINE

## 2017-03-13 PROCEDURE — 3078F DIAST BP <80 MM HG: CPT | Mod: S$GLB,,, | Performed by: INTERNAL MEDICINE

## 2017-03-13 PROCEDURE — 1160F RVW MEDS BY RX/DR IN RCRD: CPT | Mod: S$GLB,,, | Performed by: INTERNAL MEDICINE

## 2017-03-13 PROCEDURE — 1157F ADVNC CARE PLAN IN RCRD: CPT | Mod: S$GLB,,, | Performed by: INTERNAL MEDICINE

## 2017-03-13 PROCEDURE — 99213 OFFICE O/P EST LOW 20 MIN: CPT | Mod: S$GLB,,, | Performed by: INTERNAL MEDICINE

## 2017-03-13 PROCEDURE — 99999 PR PBB SHADOW E&M-EST. PATIENT-LVL III: CPT | Mod: PBBFAC,,, | Performed by: INTERNAL MEDICINE

## 2017-03-13 RX ORDER — AMOXICILLIN AND CLAVULANATE POTASSIUM 875; 125 MG/1; MG/1
1 TABLET, FILM COATED ORAL 2 TIMES DAILY
Qty: 20 TABLET | Refills: 0 | Status: SHIPPED | OUTPATIENT
Start: 2017-03-13 | End: 2017-03-23

## 2017-03-13 RX ORDER — TRAMADOL HYDROCHLORIDE 50 MG/1
50 TABLET ORAL 3 TIMES DAILY
Qty: 7 TABLET | Refills: 0 | Status: SHIPPED | OUTPATIENT
Start: 2017-03-13 | End: 2017-05-23

## 2017-03-13 NOTE — MR AVS SNAPSHOT
[facility] [address]  [address]  [address]  [place] [address]  Phone: [phone]                  Jake Louie   3/13/2017 4:00 PM   Office Visit    Description:  Male : 1945   Provider:  Artem Hood MD   Department:  [facility] [address]           Reason for Visit     Dental Pain           Diagnoses this Visit        Comments    Tooth infection    -  Primary     Gingivitis                To Do List           Future Appointments        Provider Department Dept Phone    2017 8:30 AM LAB, [place] [facility] [phone]    6/15/2017 8:40 AM Harpreet Rosario MD [facility] [phone]      Goals (5 Years of Data)     None       These Medications        Disp Refills Start End    amoxicillin-clavulanate 875-125mg (AUGMENTIN) 875-125 mg per tablet 20 tablet 0 3/13/2017 3/23/2017    Take 1 tablet by mouth 2 (two) times daily. - Oral    Pharmacy: [facility] - [place], [place] - [address] Ph #: [phone]       tramadol (ULTRAM) 50 mg tablet 7 tablet 0 3/13/2017     Take 1 tablet (50 mg total) by mouth 3 (three) times daily. - Oral    Pharmacy: [facility] - [place], [place] - [address] Ph #: [phone]         [facility]     [facility] Nurse Care Line -  Assistance  Registered nurses in the [facility] provide clinical advisement, health education, appointment booking, and other advisory services.  Call for this free service at [phone].             Medications           Message regarding Medications     Verify the changes and/or additions to your medication regime listed below are the same as discussed with your clinician today.  If any of these changes or additions are incorrect, please notify your healthcare provider.        START taking these NEW medications        Refills    amoxicillin-clavulanate  "875-125mg (AUGMENTIN) 875-125 mg per tablet 0    Sig: Take 1 tablet by mouth 2 (two) times daily.    Class: Normal    Route: Oral    tramadol (ULTRAM) 50 mg tablet 0    Sig: Take 1 tablet (50 mg total) by mouth 3 (three) times daily.    Class: Normal    Route: Oral      STOP taking these medications     furosemide (LASIX) 40 MG tablet Take 1 tablet (40 mg total) by mouth once daily.           Verify that the below list of medications is an accurate representation of the medications you are currently taking.  If none reported, the list may be blank. If incorrect, please contact your healthcare provider. Carry this list with you in case of emergency.           Current Medications     ascorbic acid (VITAMIN C) 500 MG tablet Take 500 mg by mouth once daily.    lisinopril-hydrochlorothiazide (PRINZIDE,ZESTORETIC) 20-25 mg Tab TAKE ONE TABLET BY MOUTH ONCE DAILY    multivitamin (THERAGRAN) per tablet Take 1 tablet by mouth once daily.      mupirocin (BACTROBAN) 2 % ointment APPLY TO AFFECTED AREA(S) TWO TIMES A DAY    nicotine (NICODERM CQ) 14 mg/24 hr Place 1 patch onto the skin once daily.    nicotine polacrilex 2 MG Lozg Nicorette Mini Lozenge -one 2 mg lozenge by mouth as needed, max 5 in 6 hr period.    pravastatin (PRAVACHOL) 20 MG tablet Take 1 tablet (20 mg total) by mouth once daily.    vitamin D 1000 units Tab Take 185 mg by mouth once daily.    amoxicillin-clavulanate 875-125mg (AUGMENTIN) 875-125 mg per tablet Take 1 tablet by mouth 2 (two) times daily.    tramadol (ULTRAM) 50 mg tablet Take 1 tablet (50 mg total) by mouth 3 (three) times daily.           Clinical Reference Information           Your Vitals Were     BP Pulse Height Weight BMI    119/74 84 5' 11" (1.803 m) 146.5 kg (323 lb) 45.05 kg/m2      Blood Pressure          Most Recent Value    BP  119/74      Allergies as of 3/13/2017     No Known Allergies      Immunizations Administered on Date of Encounter - 3/13/2017     None      Smoking Cessation  "    If you would like to quit smoking:   You may be eligible for free services if you are a [place] resident and started smoking cigarettes before September 1, 1988.  Call the Smoking Cessation Trust (SCT) toll free at ([phone] 668-6267 or (792) 984-3815.   Call 0-460-QUIT-NOW if you do not meet the above criteria.            Language Assistance Services     ATTENTION: Language assistance services are available, free of charge. Please call [phone].      ATENCIÓN: Si habla cielo, tiene a yen disposición servicios gratuitos de asistencia lingüística. Llame al [phone].     CHÚ Ý: N?u b?n nói Ti?ng Vi?t, có các d?ch v? h? tr? ngôn ng? mi?n phí dành cho b?n. G?i s? [phone].         [facility] [address] complies with applicable Federal civil rights laws and does not discriminate on the basis of race, color, national origin, age, disability, or sex.

## 2017-03-13 NOTE — PROGRESS NOTES
SUBJECTIVE:  This is a 71-year-old male who for one week has been having pain in   his right upper gums.  He says he had an abscessed tooth in the past and he is   going to make an appointment with his dentist.    PAST MEDICAL HISTORY:    Hypertension.  Sleep apnea.  Hyperlipidemia.  Venous insufficiency.    MEDICATIONS:  List per MedCard.    PHYSICAL EXAMINATION:  VITAL SIGNS:  Weight 323 pounds, pulse 84,  blood pressure 120/72.  HEENT:  The gum line around two of his teeth right upper area is erythematous   and tender to touch.  It does not look that way anywhere else.  Oropharynx, no   abnormal findings.    IMPRESSION:  Tooth infection with gingivitis.    PLAN:    Augmentin 875 mg twice a day for 10 days.  Tramadol 50 mg one three times a day   for a week was given.  Follow up with his dentist.      /mingo [id number] kimberly(s)        JAM/SILVIO  dd: 03/13/2017 17:03:01 (CDT)  td: 03/14/2017 09:54:16 (CDT)  Doc ID   #[id number]  Job ID #[id number]    CC:

## 2017-03-28 ENCOUNTER — TELEPHONE (OUTPATIENT)
Dept: SMOKING CESSATION | Facility: CLINIC | Age: 72
End: 2017-03-28

## 2017-04-06 ENCOUNTER — TELEPHONE (OUTPATIENT)
Dept: INTERNAL MEDICINE | Facility: CLINIC | Age: 72
End: 2017-04-06

## 2017-04-06 DIAGNOSIS — I10 ESSENTIAL HYPERTENSION: Primary | ICD-10-CM

## 2017-04-06 DIAGNOSIS — Z80.0 FAMILY HISTORY OF COLON CANCER REQUIRING SCREENING COLONOSCOPY: ICD-10-CM

## 2017-04-06 NOTE — TELEPHONE ENCOUNTER
----- Message from Coy Fuller sent at 4/6/2017  8:14 AM CDT -----  Contact: [phone]  Type: Orders Request    What orders/ testing are being requested? Colonoscopy    Is there a future appointment scheduled for the patient with PCP? No    Comments:Requesting a call back, advice    Thanks

## 2017-04-06 NOTE — TELEPHONE ENCOUNTER
His last colonoscopy was 3-2013   It was recommended to repeat in 5 years   This is 4 years   Is there a colon issue currently ?

## 2017-04-07 ENCOUNTER — TELEPHONE (OUTPATIENT)
Dept: SLEEP MEDICINE | Facility: CLINIC | Age: 72
End: 2017-04-07

## 2017-04-07 ENCOUNTER — OFFICE VISIT (OUTPATIENT)
Dept: ORTHOPEDICS | Facility: CLINIC | Age: 72
End: 2017-04-07
Payer: MEDICARE

## 2017-04-07 VITALS — WEIGHT: 315 LBS | HEIGHT: 71 IN | BODY MASS INDEX: 44.1 KG/M2

## 2017-04-07 DIAGNOSIS — M17.0 PRIMARY OSTEOARTHRITIS OF BOTH KNEES: Primary | ICD-10-CM

## 2017-04-07 PROCEDURE — 1159F MED LIST DOCD IN RCRD: CPT | Mod: S$GLB,,, | Performed by: PHYSICIAN ASSISTANT

## 2017-04-07 PROCEDURE — 99999 PR PBB SHADOW E&M-EST. PATIENT-LVL III: CPT | Mod: PBBFAC,,, | Performed by: PHYSICIAN ASSISTANT

## 2017-04-07 PROCEDURE — 99213 OFFICE O/P EST LOW 20 MIN: CPT | Mod: 25,S$GLB,, | Performed by: PHYSICIAN ASSISTANT

## 2017-04-07 PROCEDURE — 1157F ADVNC CARE PLAN IN RCRD: CPT | Mod: S$GLB,,, | Performed by: PHYSICIAN ASSISTANT

## 2017-04-07 PROCEDURE — 1160F RVW MEDS BY RX/DR IN RCRD: CPT | Mod: S$GLB,,, | Performed by: PHYSICIAN ASSISTANT

## 2017-04-07 PROCEDURE — 20610 DRAIN/INJ JOINT/BURSA W/O US: CPT | Mod: 50,S$GLB,, | Performed by: PHYSICIAN ASSISTANT

## 2017-04-07 PROCEDURE — 1125F AMNT PAIN NOTED PAIN PRSNT: CPT | Mod: S$GLB,,, | Performed by: PHYSICIAN ASSISTANT

## 2017-04-07 RX ORDER — TRIAMCINOLONE ACETONIDE 40 MG/ML
80 INJECTION, SUSPENSION INTRA-ARTICULAR; INTRAMUSCULAR
Status: COMPLETED | OUTPATIENT
Start: 2017-04-07 | End: 2017-04-07

## 2017-04-07 RX ADMIN — TRIAMCINOLONE ACETONIDE 80 MG: 40 INJECTION, SUSPENSION INTRA-ARTICULAR; INTRAMUSCULAR at 09:04

## 2017-04-07 NOTE — TELEPHONE ENCOUNTER
----- Message from Lissy Canseco sent at 4/7/2017  2:59 PM CDT -----  Contact: REX REID [[id number]]  _x  1st Request  _  2nd Request  _  3rd Request        Who: REX REID [[id number]]    Why: patient says he will  a new cpap machine the same as before on Monday and he just wants to make the MD aware of the new machine. Thanks!    What Number to Call Back: [phone]    When to Expect a call back: (Before the end of the day)   -- if the call is after 12:00, the call back will be tomorrow.

## 2017-04-07 NOTE — PROGRESS NOTES
Subjective:      Patient ID: Jake Louie is a 71 y.o. male.    Chief Complaint: No chief complaint on file.    HPI  71 year old male returns regarding his bilateral knee pain. He has a known h/o OA. He received a cortisone injection last October that provided good relief until recently. He has pain with walking. He would like to lose weight before proceeding with surgery.   Review of Systems   Constitution: Negative for chills, fever and night sweats.   Cardiovascular: Negative for chest pain.   Respiratory: Negative for cough and shortness of breath.    Hematologic/Lymphatic: Does not bruise/bleed easily.   Skin: Negative for color change.   Gastrointestinal: Negative for heartburn.   Genitourinary: Negative for dysuria.   Neurological: Negative for numbness and paresthesias.   Psychiatric/Behavioral: Negative for altered mental status.   Allergic/Immunologic: Negative for persistent infections.         Objective:            General    Vitals reviewed.  Constitutional: He is oriented to person, place, and time. He appears well-developed and well-nourished.   Cardiovascular: Normal rate.    Neurological: He is alert and oriented to person, place, and time.             Right Knee Exam:  ROM 0-120 degrees  No effusion  No warmth or erythema  No TTP     Left Knee Exam:  ROM 0-120 degrees  No effusion  No warmth or erythema  TTP medial joint line      X-ray: reviewed by myself. DJD both knees.       Assessment:       Encounter Diagnosis   Name Primary?    Primary osteoarthritis of both knees Yes          Plan:       Patient would like to repeat cortisone injections. He will f/u as needed.     PROCEDURE:  I have explained the risks, benefits, and alternatives of the procedure in detail.  The patient voices understanding and all questions have been answered.  The patient agrees to proceed as planned. So after I performed a sterile prep of the skin in the normal fashion the bilateral knee is injected using a 22  gauge needle from the anterolateral approach with a combination of 4cc 1% plain lidocaine and 40 mg of kenalog.  The patient is cautioned and immediate relief of pain is secondary to the local anesthetic and will be temporary.  After the anesthetic wears off there may be a increase in pain that may last for a few hours or a few days and they should use ice to help alleviate this flair up of pain.

## 2017-04-07 NOTE — TELEPHONE ENCOUNTER
Spoke with pt and he is going to get a machine just like his old CPAP machine from [facility] on Monday at 3:00pm.

## 2017-04-07 NOTE — TELEPHONE ENCOUNTER
----- Message from Lynne Burgos sent at 4/7/2017 10:41 AM CDT -----  Contact: Self  X   1st Request  _  2nd Request  _  3rd Request        Who: REX REID [[id number]]    Why: Pt states he has had his current machine for over 8 years. Pt needs a prescription for a new machine. Pt states the machine he received in January is broken. Pt requested to speak to the clinical team to further discuss. Please call pt, thanks    What Number to Call Back: [phone]    When to Expect a call back: (Before the end of the day)   -- if the call is after 12:00, the call back will be tomorrow.

## 2017-04-07 NOTE — TELEPHONE ENCOUNTER
LVM for pt to return call and let us know what is going on with is CPAP machine. Let pt know that Dr. Rosario will be back in clinic on Monday in voicemail.

## 2017-04-07 NOTE — MR AVS SNAPSHOT
[facility]  [address]  [facility] [address]  Phone: [phone]                  Jake Louie   2017 8:00 AM   Appointment    Description:  Male : 1945   Provider:  Alayna Danielson PA-C   Department:  [facility]                To Do List           Future Appointments        Provider Department Dept Phone    2017 8:00 AM PAIGE V[facility] [phone]    2017 8:30 AM LAB, [place] [facility] [phone]    6/15/2017 8:40 AM Harpreet Rosario MD [facility] [place] [phone]      Goals (5 Years of Data)     None      [facility]     [facility] Care [facility] -  Assistance  Unless otherwise directed by your provider, please contact [facility], our nurse care line that is available for  assistance.     Registered nurses in the [facility] provide: appointment scheduling, clinical advisement, health education, and other advisory services.  Call: [phone] (toll free)               Medications           Message regarding Medications     Verify the changes and/or additions to your medication regime listed below are the same as discussed with your clinician today.  If any of these changes or additions are incorrect, please notify your healthcare provider.             Verify that the below list of medications is an accurate representation of the medications you are currently taking.  If none reported, the list may be blank. If incorrect, please contact your healthcare provider. Carry this list with you in case of emergency.           Current Medications     ascorbic acid (VITAMIN C) 500 MG tablet Take 500 mg by mouth once daily.    lisinopril-hydrochlorothiazide (PRINZIDE,ZESTORETIC) 20-25 mg Tab TAKE ONE TABLET BY MOUTH ONCE DAILY    multivitamin (THERAGRAN) per tablet Take 1 tablet by mouth once daily.      mupirocin (BACTROBAN) 2 % ointment APPLY TO AFFECTED  AREA(S) TWO TIMES A DAY    nicotine (NICODERM CQ) 14 mg/24 hr Place 1 patch onto the skin once daily.    nicotine polacrilex 2 MG Lozg Nicorette Mini Lozenge -one 2 mg lozenge by mouth as needed, max 5 in 6 hr period.    pravastatin (PRAVACHOL) 20 MG tablet Take 1 tablet (20 mg total) by mouth once daily.    tramadol (ULTRAM) 50 mg tablet Take 1 tablet (50 mg total) by mouth 3 (three) times daily.    vitamin D 1000 units Tab Take 185 mg by mouth once daily.           Clinical Reference Information           Allergies as of 4/7/2017     No Known Allergies      Immunizations Administered on Date of Encounter - 4/7/2017     None      Smoking Cessation     If you would like to quit smoking:   You may be eligible for free services if you are a [place] resident and started smoking cigarettes before September 1, 1988.  Call the Smoking Cessation Trust ([facility]) toll free at (407) [phone] or (036) 191-1504.   Call 1-800-QUIT-NOW if you do not meet the above criteria.   Contact us via email: [email or web address]   View our website for more information: [email or web address]        Language Assistance Services     ATTENTION: Language assistance services are available, free of charge. Please call [phone].      ATENCIÓN: Si habla zacharyañol, tiene a yen disposición servicios gratuitos de asistencia lingüística. Llame al [phone].     [facility] Ý: N?u b?n nói Ti?ng Vi?t, có các d?ch v? h? tr? ngôn ng? mi?n phí dành cho b?n. G?i s? [phone].         [facility] complies with applicable Federal civil rights laws and does not discriminate on the basis of race, color, national origin, age, disability, or sex.

## 2017-04-10 ENCOUNTER — CLINICAL SUPPORT (OUTPATIENT)
Dept: SMOKING CESSATION | Facility: CLINIC | Age: 72
End: 2017-04-10
Payer: COMMERCIAL

## 2017-04-10 DIAGNOSIS — F17.200 NICOTINE DEPENDENCE: Primary | ICD-10-CM

## 2017-04-10 PROCEDURE — 99407 BEHAV CHNG SMOKING > 10 MIN: CPT | Mod: S$GLB,,, | Performed by: INTERNAL MEDICINE

## 2017-04-18 NOTE — TELEPHONE ENCOUNTER
Called pt back. No answer left a message on VM to call office back. i was going to apologize for the delayed return but i have not been in the office and the message was sent directly to my box and no one could get into it. Dr jacome was wondering if there was any colon issues going on as to why this pt felt the need to do a repeat at 4 years instead of 5.

## 2017-04-19 ENCOUNTER — TELEPHONE (OUTPATIENT)
Dept: INTERNAL MEDICINE | Facility: CLINIC | Age: 72
End: 2017-04-19

## 2017-04-20 NOTE — TELEPHONE ENCOUNTER
Called pt to set up the colonoscopy no answer. Unable to leave a message. Also sent the pt a kaufDAhart message.

## 2017-05-03 ENCOUNTER — TELEPHONE (OUTPATIENT)
Dept: SLEEP MEDICINE | Facility: CLINIC | Age: 72
End: 2017-05-03

## 2017-05-03 DIAGNOSIS — G47.33 OSA (OBSTRUCTIVE SLEEP APNEA): Primary | ICD-10-CM

## 2017-05-03 NOTE — TELEPHONE ENCOUNTER
----- Message from Angela Orellana sent at 5/3/2017 10:44 AM CDT -----  Regarding: nasal Rx  Pt is requesting to go back to the nasal mask? Can you please send us an updated Rx?

## 2017-05-05 ENCOUNTER — TELEPHONE (OUTPATIENT)
Dept: ENDOSCOPY | Facility: HOSPITAL | Age: 72
End: 2017-05-05

## 2017-05-09 ENCOUNTER — OFFICE VISIT (OUTPATIENT)
Dept: INTERNAL MEDICINE | Facility: CLINIC | Age: 72
End: 2017-05-09
Payer: MEDICARE

## 2017-05-09 VITALS
SYSTOLIC BLOOD PRESSURE: 146 MMHG | WEIGHT: 314.81 LBS | HEART RATE: 77 BPM | BODY MASS INDEX: 44.07 KG/M2 | DIASTOLIC BLOOD PRESSURE: 80 MMHG | HEIGHT: 71 IN

## 2017-05-09 DIAGNOSIS — S81.802A LEG WOUND, LEFT, INITIAL ENCOUNTER: ICD-10-CM

## 2017-05-09 DIAGNOSIS — L03.116 CELLULITIS OF LEG WITHOUT FOOT, LEFT: Primary | ICD-10-CM

## 2017-05-09 DIAGNOSIS — R60.0 LEG EDEMA, LEFT: ICD-10-CM

## 2017-05-09 DIAGNOSIS — I87.2 CHRONIC VENOUS INSUFFICIENCY: ICD-10-CM

## 2017-05-09 PROCEDURE — 96372 THER/PROPH/DIAG INJ SC/IM: CPT | Mod: S$GLB,,, | Performed by: INTERNAL MEDICINE

## 2017-05-09 PROCEDURE — 3079F DIAST BP 80-89 MM HG: CPT | Mod: S$GLB,,, | Performed by: INTERNAL MEDICINE

## 2017-05-09 PROCEDURE — 1159F MED LIST DOCD IN RCRD: CPT | Mod: S$GLB,,, | Performed by: INTERNAL MEDICINE

## 2017-05-09 PROCEDURE — 3077F SYST BP >= 140 MM HG: CPT | Mod: S$GLB,,, | Performed by: INTERNAL MEDICINE

## 2017-05-09 PROCEDURE — 1160F RVW MEDS BY RX/DR IN RCRD: CPT | Mod: S$GLB,,, | Performed by: INTERNAL MEDICINE

## 2017-05-09 PROCEDURE — 99214 OFFICE O/P EST MOD 30 MIN: CPT | Mod: 25,S$GLB,, | Performed by: INTERNAL MEDICINE

## 2017-05-09 PROCEDURE — 99999 PR PBB SHADOW E&M-EST. PATIENT-LVL III: CPT | Mod: PBBFAC,,, | Performed by: INTERNAL MEDICINE

## 2017-05-09 RX ORDER — SULFAMETHOXAZOLE AND TRIMETHOPRIM 800; 160 MG/1; MG/1
1 TABLET ORAL 2 TIMES DAILY
Qty: 20 TABLET | Refills: 0 | Status: SHIPPED | OUTPATIENT
Start: 2017-05-09 | End: 2017-05-19

## 2017-05-09 RX ORDER — FUROSEMIDE 20 MG/1
20 TABLET ORAL DAILY
Qty: 7 TABLET | Refills: 0 | Status: SHIPPED | OUTPATIENT
Start: 2017-05-09 | End: 2017-07-18

## 2017-05-09 RX ORDER — CEFTRIAXONE 1 G/1
1 INJECTION, POWDER, FOR SOLUTION INTRAMUSCULAR; INTRAVENOUS
Status: COMPLETED | OUTPATIENT
Start: 2017-05-09 | End: 2017-05-09

## 2017-05-09 RX ADMIN — CEFTRIAXONE 1 G: 1 INJECTION, POWDER, FOR SOLUTION INTRAMUSCULAR; INTRAVENOUS at 04:05

## 2017-05-09 NOTE — PROGRESS NOTES
REASON FOR VISIT:  This is a 71-year-old male who for the past five days has   been having swelling involving his left leg with a cut over it and more redness.    He has chronic venous insufficiency of both legs.  Five days ago he was at the   gym and when he was taking off his pants, somehow his left leg hit a wood piece   and he got a small cut.  He has had recurrent cellulitis involving his left leg   in the past.    PAST MEDICAL HISTORY:  Hypertension.  Complex sleep apnea  Hyperlipidemia.    MEDICATIONS:  List per MedCard.    SOCIAL HISTORY:  Tobacco use, none.    MEDICATIONS:  His blood pressure medicine is lisinopril HCT 20/25 mg.    PHYSICAL EXAMINATION:  VITAL SIGNS:  His weight is 314, which is less than before; pulse 76, blood   pressure 146/72.  EXTREMITIES:  He has 1 and 2+ edema involving his right leg, there are brownish   hyperpigmented changes with some venous varicosities.  His left leg is 3+ tense.    There is a small skin abrasion in the mid anterior area.  Minimal warmth to   touch.    IMPRESSION:  1.  Leg wound with cellulitis.  2.  Chronic venous insufficiency with worsening edema.    PLAN:  Bactrim DS twice a day for 10 days along with Lasix 20 mg once a day with   his current medications.    /sc [id number] review      JAM/SILVIO  dd: 05/09/2017 15:52:35 (CDT)  td: 05/10/2017 10:59:19 (CDT)  Doc ID   #[id number]  Job ID #[id number]    CC:

## 2017-05-09 NOTE — MR AVS SNAPSHOT
[facility] [address]  [address]  [address]  [facility] [address]  Phone: [phone]                  Jake Louie   2017 3:15 PM   Office Visit    Description:  Male : 1945   Provider:  Artem Hood MD   Department:  [facility] [address]           Reason for Visit     Recurrent Skin Infections           Diagnoses this Visit        Comments    Cellulitis of leg without foot, left    -  Primary     Leg wound, left, initial encounter         Leg edema, left         Chronic venous insufficiency                To Do List           Future Appointments        Provider Department Dept Phone    2017 8:30 AM [facility] [place] [facility] [phone]    2017 8:00 AM Artem Hood MD [facility] [phone]    6/15/2017 8:40 AM Harpreet Rosario MD [facility] [phone]      Goals (5 Years of Data)     None       These Medications        Disp Refills Start End    sulfamethoxazole-trimethoprim 800-160mg (BACTRIM DS) 800-160 mg Tab 20 tablet 0 2017    Take 1 tablet by mouth 2 (two) times daily. - Oral    Pharmacy: [facility] - [place], [place] - [address] Ph #: [phone]       furosemide (LASIX) 20 MG tablet 7 tablet 0 2017    Take 1 tablet (20 mg total) by mouth once daily. - Oral    Pharmacy: [facility] - [place], [place] - [address] Ph #: [phone]         [facility]     [facility] Nurse Care Line -  Assistance  Unless otherwise directed by your provider, please contact [facility], our nurse care line that is available for  assistance.     Registered nurses in the [facility] provide: appointment scheduling, clinical advisement, health education, and other advisory services.  Call: [phone] (toll free)               Medications           Message  regarding Medications     Verify the changes and/or additions to your medication regime listed below are the same as discussed with your clinician today.  If any of these changes or additions are incorrect, please notify your healthcare provider.        START taking these NEW medications        Refills    sulfamethoxazole-trimethoprim 800-160mg (BACTRIM DS) 800-160 mg Tab 0    Sig: Take 1 tablet by mouth 2 (two) times daily.    Class: Normal    Route: Oral    furosemide (LASIX) 20 MG tablet 0    Sig: Take 1 tablet (20 mg total) by mouth once daily.    Class: Normal    Route: Oral           Verify that the below list of medications is an accurate representation of the medications you are currently taking.  If none reported, the list may be blank. If incorrect, please contact your healthcare provider. Carry this list with you in case of emergency.           Current Medications     ascorbic acid (VITAMIN C) 500 MG tablet Take 500 mg by mouth once daily.    lisinopril-hydrochlorothiazide (PRINZIDE,ZESTORETIC) 20-25 mg Tab TAKE ONE TABLET BY MOUTH ONCE DAILY    multivitamin (THERAGRAN) per tablet Take 1 tablet by mouth once daily.      mupirocin (BACTROBAN) 2 % ointment APPLY TO AFFECTED AREA(S) TWO TIMES A DAY    nicotine (NICODERM CQ) 14 mg/24 hr Place 1 patch onto the skin once daily.    nicotine polacrilex 2 MG Lozg Nicorette Mini Lozenge -one 2 mg lozenge by mouth as needed, max 5 in 6 hr period.    pravastatin (PRAVACHOL) 20 MG tablet Take 1 tablet (20 mg total) by mouth once daily.    tramadol (ULTRAM) 50 mg tablet Take 1 tablet (50 mg total) by mouth 3 (three) times daily.    vitamin D 1000 units Tab Take 185 mg by mouth once daily.    furosemide (LASIX) 20 MG tablet Take 1 tablet (20 mg total) by mouth once daily.    sulfamethoxazole-trimethoprim 800-160mg (BACTRIM DS) 800-160 mg Tab Take 1 tablet by mouth 2 (two) times daily.           Clinical Reference Information           Your Vitals Were     BP Pulse  "Height Weight BMI    146/80 77 5' 11" (1.803 m) 142.8 kg (314 lb 12.8 oz) 43.91 kg/m2      Blood Pressure          Most Recent Value    BP  (!)  146/80      Allergies as of 5/9/2017     No Known Allergies      Immunizations Administered on Date of Encounter - 5/9/2017     None      Smoking Cessation     If you would like to quit smoking:   You may be eligible for free services if you are a [place] resident and started smoking cigarettes before September 1, 1988.  Call the Smoking [employer]essation [employer] ([facility]) toll free at [phone] or [phone].   Call 5-219-QUIT-NOW if you do not meet the above criteria.   Contact us via email: [email or web address]   View our website for more information: [email or web address]        Language Assistance Services     ATTENTION: Language assistance services are available, free of charge. Please call [phone].      ATENCIÓN: Si habla español, tiene a yen disposición servicios gratuitos de asistencia lingüística. Llame al [phone].     CHÚ Ý: N?u b?n nói Ti?ng Vi?t, có các d?ch v? h? tr? ngôn ng? mi?n phí dành cho b?n. G?i s? [phone].         [facility] [address] complies with applicable Federal civil rights laws and does not discriminate on the basis of race, color, national origin, age, disability, or sex.        "

## 2017-05-16 RX ORDER — MUPIROCIN 20 MG/G
OINTMENT TOPICAL
Qty: 44 G | Refills: 0 | Status: SHIPPED | OUTPATIENT
Start: 2017-05-16 | End: 2017-05-23

## 2017-05-17 ENCOUNTER — LAB VISIT (OUTPATIENT)
Dept: LAB | Facility: HOSPITAL | Age: 72
End: 2017-05-17
Attending: INTERNAL MEDICINE
Payer: MEDICARE

## 2017-05-17 DIAGNOSIS — Z00.00 ANNUAL PHYSICAL EXAM: ICD-10-CM

## 2017-05-17 DIAGNOSIS — I87.2 VENOUS INSUFFICIENCY: ICD-10-CM

## 2017-05-17 DIAGNOSIS — I10 ESSENTIAL HYPERTENSION: ICD-10-CM

## 2017-05-17 DIAGNOSIS — E78.5 HYPERLIPIDEMIA, UNSPECIFIED HYPERLIPIDEMIA TYPE: ICD-10-CM

## 2017-05-17 DIAGNOSIS — M51.36 LUMBAR DEGENERATIVE DISC DISEASE: ICD-10-CM

## 2017-05-17 LAB
ALBUMIN SERPL BCP-MCNC: 3.4 G/DL
ALP SERPL-CCNC: 87 U/L
ALT SERPL W/O P-5'-P-CCNC: 36 U/L
ANION GAP SERPL CALC-SCNC: 8 MMOL/L
AST SERPL-CCNC: 23 U/L
BILIRUB SERPL-MCNC: 0.5 MG/DL
BUN SERPL-MCNC: 22 MG/DL
CALCIUM SERPL-MCNC: 9 MG/DL
CHLORIDE SERPL-SCNC: 105 MMOL/L
CHOLEST/HDLC SERPL: 4.8 {RATIO}
CO2 SERPL-SCNC: 24 MMOL/L
CREAT SERPL-MCNC: 1.4 MG/DL
EST. GFR  (AFRICAN AMERICAN): 58.4 ML/MIN/1.73 M^2
EST. GFR  (NON AFRICAN AMERICAN): 50.5 ML/MIN/1.73 M^2
GLUCOSE SERPL-MCNC: 116 MG/DL
HDL/CHOLESTEROL RATIO: 20.7 %
HDLC SERPL-MCNC: 174 MG/DL
HDLC SERPL-MCNC: 36 MG/DL
LDLC SERPL CALC-MCNC: 117 MG/DL
NONHDLC SERPL-MCNC: 138 MG/DL
POTASSIUM SERPL-SCNC: 4.5 MMOL/L
PROT SERPL-MCNC: 7.1 G/DL
SODIUM SERPL-SCNC: 137 MMOL/L
TRIGL SERPL-MCNC: 105 MG/DL

## 2017-05-17 PROCEDURE — 80061 LIPID PANEL: CPT

## 2017-05-17 PROCEDURE — 80053 COMPREHEN METABOLIC PANEL: CPT

## 2017-05-17 PROCEDURE — 36415 COLL VENOUS BLD VENIPUNCTURE: CPT | Mod: PO

## 2017-05-23 ENCOUNTER — LAB VISIT (OUTPATIENT)
Dept: LAB | Facility: HOSPITAL | Age: 72
End: 2017-05-23
Attending: INTERNAL MEDICINE
Payer: MEDICARE

## 2017-05-23 ENCOUNTER — OFFICE VISIT (OUTPATIENT)
Dept: INTERNAL MEDICINE | Facility: CLINIC | Age: 72
End: 2017-05-23
Payer: MEDICARE

## 2017-05-23 ENCOUNTER — TELEPHONE (OUTPATIENT)
Dept: INTERNAL MEDICINE | Facility: CLINIC | Age: 72
End: 2017-05-23

## 2017-05-23 VITALS — WEIGHT: 315 LBS | HEIGHT: 71 IN | BODY MASS INDEX: 44.1 KG/M2

## 2017-05-23 DIAGNOSIS — E66.01 OBESITY, CLASS III, BMI 40-49.9 (MORBID OBESITY): ICD-10-CM

## 2017-05-23 DIAGNOSIS — R79.89 HIGH SERUM CREATINE: ICD-10-CM

## 2017-05-23 DIAGNOSIS — G47.33 OSA (OBSTRUCTIVE SLEEP APNEA): ICD-10-CM

## 2017-05-23 DIAGNOSIS — I10 ESSENTIAL HYPERTENSION: ICD-10-CM

## 2017-05-23 DIAGNOSIS — R60.0 BILATERAL LEG EDEMA: ICD-10-CM

## 2017-05-23 DIAGNOSIS — E78.5 HYPERLIPIDEMIA, UNSPECIFIED HYPERLIPIDEMIA TYPE: ICD-10-CM

## 2017-05-23 DIAGNOSIS — R73.9 HYPERGLYCEMIA: ICD-10-CM

## 2017-05-23 DIAGNOSIS — I10 ESSENTIAL HYPERTENSION: Primary | ICD-10-CM

## 2017-05-23 LAB
ANION GAP SERPL CALC-SCNC: 10 MMOL/L
BUN SERPL-MCNC: 20 MG/DL
CALCIUM SERPL-MCNC: 9.2 MG/DL
CHLORIDE SERPL-SCNC: 106 MMOL/L
CO2 SERPL-SCNC: 22 MMOL/L
CREAT SERPL-MCNC: 0.9 MG/DL
EST. GFR  (AFRICAN AMERICAN): >60 ML/MIN/1.73 M^2
EST. GFR  (NON AFRICAN AMERICAN): >60 ML/MIN/1.73 M^2
GLUCOSE SERPL-MCNC: 207 MG/DL
POTASSIUM SERPL-SCNC: 4.2 MMOL/L
SODIUM SERPL-SCNC: 138 MMOL/L

## 2017-05-23 PROCEDURE — 99499 UNLISTED E&M SERVICE: CPT | Mod: S$GLB,,, | Performed by: INTERNAL MEDICINE

## 2017-05-23 PROCEDURE — 1157F ADVNC CARE PLAN IN RCRD: CPT | Mod: S$GLB,,, | Performed by: INTERNAL MEDICINE

## 2017-05-23 PROCEDURE — 99999 PR PBB SHADOW E&M-EST. PATIENT-LVL II: CPT | Mod: PBBFAC,,, | Performed by: INTERNAL MEDICINE

## 2017-05-23 PROCEDURE — 36415 COLL VENOUS BLD VENIPUNCTURE: CPT | Mod: PO

## 2017-05-23 PROCEDURE — 1159F MED LIST DOCD IN RCRD: CPT | Mod: S$GLB,,, | Performed by: INTERNAL MEDICINE

## 2017-05-23 PROCEDURE — 80048 BASIC METABOLIC PNL TOTAL CA: CPT

## 2017-05-23 PROCEDURE — 83036 HEMOGLOBIN GLYCOSYLATED A1C: CPT

## 2017-05-23 PROCEDURE — 99214 OFFICE O/P EST MOD 30 MIN: CPT | Mod: S$GLB,,, | Performed by: INTERNAL MEDICINE

## 2017-05-23 PROCEDURE — 1160F RVW MEDS BY RX/DR IN RCRD: CPT | Mod: S$GLB,,, | Performed by: INTERNAL MEDICINE

## 2017-05-23 RX ORDER — ZALEPLON 10 MG/1
10 CAPSULE ORAL NIGHTLY PRN
Qty: 30 CAPSULE | Refills: 0 | Status: SHIPPED | OUTPATIENT
Start: 2017-05-23 | End: 2017-06-22

## 2017-05-23 NOTE — TELEPHONE ENCOUNTER
----- Message from Chana Currie sent at 5/23/2017  2:12 PM CDT -----  Contact: Self/ [phone]   Pt is calling to speak with someone in the office about some sleeping medication that the doctor was supposed to give him. Please call and advise     Thank you

## 2017-05-23 NOTE — TELEPHONE ENCOUNTER
Pt is requesting his lab results done on 5/17/17 to be mailed to his home . i can print them but do i need to wait for you to do notes on them .      Please advise

## 2017-05-23 NOTE — PROGRESS NOTES
REASON FOR VISIT:  This is a 71-year-old male who comes in for routine followup   visit.  I saw him two weeks ago for edema and cellulitis of the left leg.  It   has gone down and improved since being on the Lasix and Bactrim.    PAST MEDICAL HISTORY:  Hypertension.  Hyperlipidemia.  Complex sleep apnea, on auto BiPAP.  Adenomatous colon polyp in year 2002.  Chronic lower extremity edema; previous venous Doppler was unrevealing.  Morbid obesity with comorbidities of hypertension, hyperlipidemia, and sleep   apnea with a BMI of 44.    SOCIAL HISTORY:  Tobacco use, none.  Alcohol use, none.    MEDICATIONS:  Lisinopril HCT 20/25 mg a day.  Pravastatin 20 mg a day.  Multivitamin.  Vitamin C.  Vitamin D.    RECENT LABS:  It is noted that his blood glucose was 116 and there was a bump in   creatinine of 1.4.  Cholesterol was 174, triglycerides 105, HDL 36, and LDL   117; actually this improved since being on pravastatin 20 mg a day.    REVIEW OF SYMPTOMS:  No chest pain.  No abdominal pain.  He has regular bowel   function.  Urination is slow.  Some urgency, but this is nothing new.  He is not   having any back pain.    A chest x-ray done on March 17 did reveal a left lung granuloma.    PHYSICAL EXAMINATION:  VITAL SIGNS:  Weight is 317, BMI is 44, pulse rate is 80, blood pressure by me   138/62.  NECK:  No thyromegaly.  LUNGS:  Clear.  HEART:  Regular rate and rhythm, I-II/VI systolic murmur at the base to the   apex.  ABDOMEN:  Active bowel sounds, soft, nontender.  No hepatosplenomegaly or   abdominal masses.  EXTREMITIES:  Bilateral 1+ edema.  SKIN:  Hyperpigmented changes involving the legs.  PULSES:  2+ pedal pulses.    REVIEW OF THE CHART:  A previous 2D echo two years ago that did reveal ejection   fraction of 50%, which was on the low normal.    IMPRESSION:  1.  Hypertension.  2.  Hyperlipidemia.  3.  Elevated creatinine.  4.  Sleep apnea.  5.  Morbid obesity with comorbidities.  6.  Lower extremity edema.  7.   Hyperglycemia.    PLAN:  Today we will get a basic metabolic profile and hemoglobin A1c, arrange   for a 2D echo with Doppler, and repeat a venous Doppler.  Phone review to follow   up.    /sc [id number] review      BETY/HN  dd: 05/23/2017 08:44:08 (CDT)  td: 05/24/2017 08:22:41 (CDT)  Doc ID   #[id number]  Job ID #[id number]    CC:

## 2017-05-23 NOTE — TELEPHONE ENCOUNTER
----- Message from Christina Ventura MA sent at 5/23/2017  9:04 AM CDT -----  Contact: self [phone]  Patient is requesting a copy of labs on 5/17/17....please mail to patient home address.

## 2017-05-24 ENCOUNTER — HOSPITAL ENCOUNTER (OUTPATIENT)
Dept: CARDIOLOGY | Facility: CLINIC | Age: 72
Discharge: HOME | End: 2017-05-24
Payer: MEDICARE

## 2017-05-24 DIAGNOSIS — R73.9 HYPERGLYCEMIA: ICD-10-CM

## 2017-05-24 DIAGNOSIS — R60.0 BILATERAL LEG EDEMA: ICD-10-CM

## 2017-05-24 DIAGNOSIS — I10 ESSENTIAL HYPERTENSION: ICD-10-CM

## 2017-05-24 DIAGNOSIS — R79.89 HIGH SERUM CREATINE: ICD-10-CM

## 2017-05-24 DIAGNOSIS — G47.33 OSA (OBSTRUCTIVE SLEEP APNEA): ICD-10-CM

## 2017-05-24 LAB
AORTIC VALVE STENOSIS: NORMAL
DIASTOLIC DYSFUNCTION: NO
ESTIMATED AVG GLUCOSE: 140 MG/DL
ESTIMATED PA SYSTOLIC PRESSURE: 23.61
GLOBAL PERICARDIAL EFFUSION: NORMAL
HBA1C MFR BLD HPLC: 6.5 %
MITRAL VALVE MOBILITY: NORMAL
RETIRED EF AND QEF - SEE NOTES: 60 (ref 55–65)

## 2017-05-24 PROCEDURE — 93306 TTE W/DOPPLER COMPLETE: CPT | Mod: S$GLB,,, | Performed by: INTERNAL MEDICINE

## 2017-05-24 NOTE — TELEPHONE ENCOUNTER
Message from Christina Ventura MA sent at 5/23/2017  9:04 AM CDT -----  Contact: self [phone]  Patient is requesting a copy of labs on 5/17/17....please mail to patient home address.          Pt is requesting his lab results done on 5/17/17 to be mailed to his home . i can print them but do i need to wait for you to do notes on them .        Please advise

## 2017-05-25 ENCOUNTER — TELEPHONE (OUTPATIENT)
Dept: INTERNAL MEDICINE | Facility: CLINIC | Age: 72
End: 2017-05-25

## 2017-05-25 ENCOUNTER — PATIENT MESSAGE (OUTPATIENT)
Dept: INTERNAL MEDICINE | Facility: CLINIC | Age: 72
End: 2017-05-25

## 2017-05-25 DIAGNOSIS — E11.9 TYPE 2 DIABETES MELLITUS WITHOUT COMPLICATION, WITHOUT LONG-TERM CURRENT USE OF INSULIN: Primary | ICD-10-CM

## 2017-05-25 RX ORDER — METFORMIN HYDROCHLORIDE 500 MG/1
500 TABLET ORAL 2 TIMES DAILY WITH MEALS
Qty: 60 TABLET | Refills: 6 | Status: SHIPPED | OUTPATIENT
Start: 2017-05-25 | End: 2017-09-19 | Stop reason: SDUPTHER

## 2017-05-25 RX ORDER — MELOXICAM 15 MG/1
15 TABLET ORAL DAILY
Qty: 30 TABLET | Refills: 2 | Status: SHIPPED | OUTPATIENT
Start: 2017-05-25 | End: 2017-06-24

## 2017-05-25 NOTE — TELEPHONE ENCOUNTER
----- Message from Coy Fuller sent at 5/25/2017  2:50 PM CDT -----  Contact: Self [phone]  The above pt is calling to inform you that he scheduled an apt with Smoking Cessation clinic, advice    Thanks

## 2017-05-31 ENCOUNTER — HOSPITAL ENCOUNTER (OUTPATIENT)
Dept: VASCULAR SURGERY | Facility: CLINIC | Age: 72
Discharge: HOME | End: 2017-05-31
Attending: INTERNAL MEDICINE
Payer: MEDICARE

## 2017-05-31 ENCOUNTER — DOCUMENTATION ONLY (OUTPATIENT)
Dept: INTERNAL MEDICINE | Facility: CLINIC | Age: 72
End: 2017-05-31

## 2017-05-31 ENCOUNTER — CLINICAL SUPPORT (OUTPATIENT)
Dept: SMOKING CESSATION | Facility: CLINIC | Age: 72
End: 2017-05-31
Payer: COMMERCIAL

## 2017-05-31 ENCOUNTER — PATIENT MESSAGE (OUTPATIENT)
Dept: INTERNAL MEDICINE | Facility: CLINIC | Age: 72
End: 2017-05-31

## 2017-05-31 VITALS — WEIGHT: 310.88 LBS | BODY MASS INDEX: 43.35 KG/M2

## 2017-05-31 DIAGNOSIS — R60.0 BILATERAL LEG EDEMA: ICD-10-CM

## 2017-05-31 DIAGNOSIS — R73.9 HYPERGLYCEMIA: ICD-10-CM

## 2017-05-31 DIAGNOSIS — R79.89 HIGH SERUM CREATINE: ICD-10-CM

## 2017-05-31 DIAGNOSIS — G47.33 OSA (OBSTRUCTIVE SLEEP APNEA): ICD-10-CM

## 2017-05-31 DIAGNOSIS — I10 ESSENTIAL HYPERTENSION: ICD-10-CM

## 2017-05-31 DIAGNOSIS — F17.200 SMOKES AND MOTIVATED TO QUIT: Primary | ICD-10-CM

## 2017-05-31 PROCEDURE — 93970 EXTREMITY STUDY: CPT | Mod: S$GLB,,, | Performed by: SURGERY

## 2017-05-31 PROCEDURE — 99404 PREV MED CNSL INDIV APPRX 60: CPT | Mod: S$GLB,,,

## 2017-05-31 RX ORDER — IBUPROFEN 200 MG
1 TABLET ORAL DAILY
Qty: 28 PATCH | Refills: 0 | Status: SHIPPED | OUTPATIENT
Start: 2017-05-31 | End: 2017-06-19 | Stop reason: SDUPTHER

## 2017-05-31 NOTE — PROGRESS NOTES
5/31/17    See Smoking Cessation Smart Form    Additional Interventions:  · Recommended patient participate in Smoking Cessation Group .  · Discussed triggers and planning for quit date.  · Given patient education handouts from [employer] Tool Kit #3  · Educated patient about and gave patient education handouts from  [employer] Drug Information on: NRT  · Provided phone number to reach [facility]ation [facility] CTTS (Certified Tobacco Treatment Specialist) for future assistance and numbers to 24/7 Quit lines.

## 2017-06-01 ENCOUNTER — PATIENT MESSAGE (OUTPATIENT)
Dept: INTERNAL MEDICINE | Facility: CLINIC | Age: 72
End: 2017-06-01

## 2017-06-01 NOTE — PROGRESS NOTES
Lab studies were reviewed    hga1c was 6.5    metformin 500 mg bid was started and refer to diabetic education     2decho and venous doppler were fine    regarding edema of the legs , main treatment is to establish steady weight loss

## 2017-06-12 ENCOUNTER — OFFICE VISIT (OUTPATIENT)
Dept: OTOLARYNGOLOGY | Facility: CLINIC | Age: 72
End: 2017-06-12
Payer: MEDICARE

## 2017-06-12 VITALS
SYSTOLIC BLOOD PRESSURE: 140 MMHG | WEIGHT: 310.88 LBS | HEIGHT: 71 IN | BODY MASS INDEX: 43.52 KG/M2 | DIASTOLIC BLOOD PRESSURE: 80 MMHG

## 2017-06-12 DIAGNOSIS — H60.91 OTITIS EXTERNA OF RIGHT EAR, UNSPECIFIED CHRONICITY, UNSPECIFIED TYPE: Primary | ICD-10-CM

## 2017-06-12 PROCEDURE — 92504 EAR MICROSCOPY EXAMINATION: CPT | Mod: S$GLB,,, | Performed by: NURSE PRACTITIONER

## 2017-06-12 PROCEDURE — 99999 PR PBB SHADOW E&M-EST. PATIENT-LVL III: CPT | Mod: PBBFAC,,, | Performed by: NURSE PRACTITIONER

## 2017-06-12 PROCEDURE — 1159F MED LIST DOCD IN RCRD: CPT | Mod: S$GLB,,, | Performed by: NURSE PRACTITIONER

## 2017-06-12 PROCEDURE — 99213 OFFICE O/P EST LOW 20 MIN: CPT | Mod: 25,S$GLB,, | Performed by: NURSE PRACTITIONER

## 2017-06-12 NOTE — PATIENT INSTRUCTIONS
Ciprodex Otic drops 4 drops to Right ear TID x 10 days. (called into pharmacy)  Advised to not let water enter ear canal: wear ear plug  or cotton ball with petroleum jelly to ear canal when showering.  Add one third cup each of distilled water, rubbing alcohol and white vinegar into a measuring cup, add one quarter teaspoon of table salt. Warm to body temperature and irrigate ear with solution as directed. Dry ear after with blow dryer.  RTC in 2 weeks or prn sooner if symptoms worsen.

## 2017-06-14 ENCOUNTER — CLINICAL SUPPORT (OUTPATIENT)
Dept: DIABETES | Facility: CLINIC | Age: 72
End: 2017-06-14
Payer: MEDICARE

## 2017-06-14 ENCOUNTER — TELEPHONE (OUTPATIENT)
Dept: SMOKING CESSATION | Facility: CLINIC | Age: 72
End: 2017-06-14

## 2017-06-14 DIAGNOSIS — E11.9 TYPE 2 DIABETES MELLITUS WITHOUT COMPLICATION, WITHOUT LONG-TERM CURRENT USE OF INSULIN: ICD-10-CM

## 2017-06-14 DIAGNOSIS — R73.9 HYPERGLYCEMIA: Primary | ICD-10-CM

## 2017-06-14 PROCEDURE — G0108 DIAB MANAGE TRN  PER INDIV: HCPCS | Mod: S$GLB,,, | Performed by: DIETITIAN, REGISTERED

## 2017-06-14 NOTE — TELEPHONE ENCOUNTER
Left a message about missed appointment  with smoking cessation counselor. Questioned tobacco use as well as medication use. Left name and call back number.

## 2017-06-15 ENCOUNTER — OFFICE VISIT (OUTPATIENT)
Dept: SLEEP MEDICINE | Facility: CLINIC | Age: 72
End: 2017-06-15
Payer: MEDICARE

## 2017-06-15 VITALS
HEART RATE: 78 BPM | OXYGEN SATURATION: 95 % | SYSTOLIC BLOOD PRESSURE: 120 MMHG | HEIGHT: 71 IN | WEIGHT: 309.5 LBS | DIASTOLIC BLOOD PRESSURE: 72 MMHG | BODY MASS INDEX: 43.33 KG/M2

## 2017-06-15 DIAGNOSIS — G47.31 COMPLEX SLEEP APNEA SYNDROME: Primary | ICD-10-CM

## 2017-06-15 DIAGNOSIS — E66.01 MORBID OBESITY WITH BMI OF 40.0-44.9, ADULT: ICD-10-CM

## 2017-06-15 PROCEDURE — 1159F MED LIST DOCD IN RCRD: CPT | Mod: S$GLB,,, | Performed by: PSYCHIATRY & NEUROLOGY

## 2017-06-15 PROCEDURE — 99214 OFFICE O/P EST MOD 30 MIN: CPT | Mod: S$GLB,,, | Performed by: PSYCHIATRY & NEUROLOGY

## 2017-06-15 PROCEDURE — 1126F AMNT PAIN NOTED NONE PRSNT: CPT | Mod: S$GLB,,, | Performed by: PSYCHIATRY & NEUROLOGY

## 2017-06-15 PROCEDURE — 99999 PR PBB SHADOW E&M-EST. PATIENT-LVL III: CPT | Mod: PBBFAC,,, | Performed by: PSYCHIATRY & NEUROLOGY

## 2017-06-15 RX ORDER — CIPROFLOXACIN HYDROCHLORIDE 3 MG/ML
SOLUTION/ DROPS OPHTHALMIC
COMMUNITY
Start: 2017-06-12 | End: 2018-03-29

## 2017-06-15 NOTE — PROGRESS NOTES
"This 69 year-old male s/p UPPP returns for the management of complex sleep apnea.     Patient reports marked improvement of his sleep with ASV BiPAP. Better sleep maintenance.   Bedtime between 8-10 pm; Out of bed 5 am    He is using ASV BIPAP machine on a nightly basis  No problems using the ASV. He did get a replaced machine, which has the same settings.  He moved his data card from older to new machine.  He is using a nasal (True Blue) MW/L mask. He attempted a full mask, but felt he couldn't breathe right, so he went back to his nasal mask.   He does use a chin strap "all time". Red and black band "full head gear".    Patient reports resolution of the air hunger with increasing PS to min 10. "I'm out as soon as I put it on".    No longer fatigued during the day. He is still swimming for exercise, 60+ laps.  Weight loss of 13 lbs since last visit.    His most recent complaint was 3 am awakenings, but this seemed to have resolved spontaneously.  He tried temazepam, sonata, and Trazodone 50 mg, but has discontinued.    ESS = 1    ASV interrogation: 6120 hours; machine appears to be cycling appropriately. ASV settings; Max 25, PS max 15 PS min 5; EPAP min 5 EPAP max 10    DIAGNOSTIC PSG: Most recent overnight PSG demonstrated complex sleep apnea. He had an AHI in severe range (AHI >100). With application of PAP, central apneas became apparent, which did not clear with up-titration. Sleep disordered breathing was finally relieved with application of biPAP 19/10 cm with back up rate 8.  Patient did not tolerate biPAP with back up rate.     ASV TITRATION 9/17/13: Effective improvement of complex sleep apnea with ASV at default settings, EPAP min set at 5 cm.    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 He eventually was sent up on auto titrating BiPAP, which he tolerated, and felt better as a result. He later had UPPP, but had residual JUICE by sleep study done in [place] " "(results not available). He was started back on CPAP after 2003, but he had difficulty tolerating it. He feels that main issues is wrong machine. He would like to be considered for bipap, because he felt he slept much better with this device than CPAP. JUICE symptoms: unable to sleep well, gasping/stop breathing in sleep, and excessive tiredness during the day    BASELINE PSG 1/19/98 ( report) -prior UPPP: +JUICE, , low sat 90%.    SLEEP ROUTINE:  Time to bed: 8-10pm  Sleep onset latency: 30-60 mins  Disruptions or awakenings: 1-2 x  Wakeup time: 4 am   Perceived sleep quality: 2 out of 5    PAST MEDICAL/ FAMILY / SOCIAL HISTORY:  reviewed in EPIC  Past Medical History:   Diagnosis Date    Depression     HEARING LOSS     Hyperlipidemia     Hypertension     JUICE (obstructive sleep apnea)     Personal history of colonic polyps     Restless legs syndrome (RLS)     Sleep apnea        Past Surgical History:   Procedure Laterality Date    KNEE SURGERY      KNEE SURGERY      leg fx  repair       Family History   Problem Relation Age of Onset    Arthritis Mother     Hypertension Mother     Hearing loss Mother     Pneumonia Mother     Parkinsonism Father     Hypertension Brother     Colon cancer Brother     Hypertension Brother     Hypertension Brother        Social History   Substance Use Topics    Smoking status: Current Every Day Smoker     Packs/day: 0.50     Years: 50.00    Smokeless tobacco: Never Used    Alcohol use No       REVIEW OF SYSTEMS:    Weight down 13 lbs;   Denies sinus problems; Denies dyspnea;   Mood okay    ALLERGIES: Reviewed in EPIC    CURRENT MEDICATIONS: Reviewed in EPIC      PHYSICAL EXAM:  /72 (BP Location: Right arm, Patient Position: Sitting, BP Method: Manual)   Pulse 78   Ht 5' 11" (1.803 m)   Wt (!) 140.4 kg (309 lb 8.4 oz)   SpO2 95%   BMI 43.17 kg/m²   GENERAL: Obese body habitus, well groomed                                    "     ASSESSMENT:    1. Complex sleep apnea (CompSA) with emerging central apnea with standard PAP modalities. Well controlled with ASV BIPAP.  He is s/p UPPP with prior symptoms of ongoing gasping for air in sleep, disrupted sleep, and daytime tiredness without PAP therapy.  He has responded remarkably well to ASV BiPAP. He reports resolution of his symptoms, including difficulty with sleep initiation. Difficulty with sleep maintenance improved with 10+ lbs weight loss. He continues to demonstrate adherence. His machine predicts >90% reduction from baseline AHI, which is excellent. Residual AHI <10 on last download.    His ECHO 5/25/2017 showed EF is 60%, so he falls within a different patient population than SERVE-HF study population.    2. Morbid obesity BMI >40. He is making progress with an aggressive work out regimen, weight is down 13 lbs from last visit.    The patient has medical co-morbidities of hypertension, and obesity (BMI >30).    PLAN:    Treatment:  1. Continue  ASV BIPAP at current settings; EPAP min 5 cm; PS to min 5, so that minimum starting pressure is 10/5 cm (to minimize air hunger); Max pressure would be 25/10.  2. Patient wishes to continue the nasal mask with chin strap.  3. Patient was encouraged to continue his weight loss and the benefits were reiterated.     Precautions: The patient was advised to abstain from driving should he feel sleepy or drowsy.    Follow up: 6 months. MD

## 2017-06-18 NOTE — PROGRESS NOTES
Subjective:       Patient ID: Jake Louie is a 72 y.o. male.    Chief Complaint: Ear Fullness    Mr. Jake Louie is a 73 y/o CM that presents with a several week h/o R otorrhea. Symptoms are worsening. He reports recently going swimming. Treatments tried include OTC ear drops. He denies otalgia, tinnitus, dizziness, or vertigo.    Ear Drainage    There is pain in the right ear. This is a recurrent problem. The current episode started 1 to 4 weeks ago. The problem occurs constantly. The problem has been gradually worsening. There has been no fever. The patient is experiencing no pain. Associated symptoms include ear discharge and hearing loss. Pertinent negatives include no abdominal pain, coughing, diarrhea, headaches, neck pain, rash, rhinorrhea, sore throat or vomiting. He has tried ear drops for the symptoms. The treatment provided no relief. His past medical history is significant for hearing loss. There is no history of a chronic ear infection or a tympanostomy tube.        Past Medical History: Patient has a past medical history of Depression; HEARING LOSS; Hyperlipidemia; Hypertension; JUICE (obstructive sleep apnea); Personal history of colonic polyps; Restless legs syndrome (RLS); and Sleep apnea.    Past Surgical History: Patient has a past surgical history that includes leg fx (repair); Knee surgery; and Knee surgery.    Social History: Patient reports that he has been smoking.  He has a 25.00 pack-year smoking history. He has never used smokeless tobacco. He reports that he does not drink alcohol or use drugs.    Family History: family history includes Arthritis in his mother; Colon cancer in his brother; Hearing loss in his mother; Hypertension in his brother, brother, brother, and mother; Parkinsonism in his father; Pneumonia in his mother.    Medications:   Current Outpatient Prescriptions   Medication Sig    ascorbic acid (VITAMIN C) 500 MG tablet Take 500 mg by mouth once daily.     ciprofloxacin HCl (CILOXAN) 0.3 % ophthalmic solution     furosemide (LASIX) 20 MG tablet Take 1 tablet (20 mg total) by mouth once daily.    lisinopril-hydrochlorothiazide (PRINZIDE,ZESTORETIC) 20-25 mg Tab TAKE ONE TABLET BY MOUTH ONCE DAILY    meloxicam (MOBIC) 15 MG tablet Take 1 tablet (15 mg total) by mouth once daily.    metformin (GLUCOPHAGE) 500 MG tablet Take 1 tablet (500 mg total) by mouth 2 (two) times daily with meals.    multivitamin (THERAGRAN) per tablet Take 1 tablet by mouth once daily.      nicotine (NICODERM CQ) 21 mg/24 hr Place 1 patch onto the skin once daily. Generic preferred.    nicotine polacrilex 2 MG Lozg Nicorette Mini Lozenge -one 2 mg lozenge by mouth as needed, max 5 in 6 hr period.    pravastatin (PRAVACHOL) 20 MG tablet Take 1 tablet (20 mg total) by mouth once daily.    vitamin D 1000 units Tab Take 185 mg by mouth once daily.    zaleplon (SONATA) 10 MG capsule Take 1 capsule (10 mg total) by mouth nightly as needed.     No current facility-administered medications for this visit.        Allergies: Patient has No Known Allergies.    Review of Systems   Constitutional: Negative for activity change, fatigue, fever and unexpected weight change.   HENT: Positive for ear discharge and hearing loss. Negative for congestion, dental problem, ear pain, facial swelling, mouth sores, nosebleeds, postnasal drip, rhinorrhea, sinus pressure, sneezing, sore throat, tinnitus, trouble swallowing and voice change.    Eyes: Negative for pain and visual disturbance.   Respiratory: Negative for cough, choking, chest tightness, shortness of breath, wheezing and stridor.    Cardiovascular: Negative for chest pain.   Gastrointestinal: Negative for abdominal pain, diarrhea, nausea and vomiting.   Musculoskeletal: Negative for gait problem, neck pain and neck stiffness.   Skin: Negative for color change, rash and wound.   Allergic/Immunologic: Negative for environmental allergies.  "  Neurological: Negative for dizziness, seizures, syncope, facial asymmetry, speech difficulty, weakness, light-headedness, numbness and headaches.   Psychiatric/Behavioral: Negative for agitation, confusion and decreased concentration. The patient is not nervous/anxious.        Objective:       BP (!) 140/80 (BP Location: Right arm, Patient Position: Sitting, BP Method: Automatic)   Ht 5' 11" (1.803 m)   Wt (!) 141 kg (310 lb 13.6 oz)   BMI 43.35 kg/m²     Physical Exam   Constitutional: He is oriented to person, place, and time. He appears well-developed and well-nourished.   HENT:   Head: Normocephalic and atraumatic. Not macrocephalic and not microcephalic. Head is without raccoon's eyes, without Alexander's sign, without abrasion, without contusion, without laceration, without right periorbital erythema and without left periorbital erythema. Hair is normal.   Right Ear: Tympanic membrane and external ear normal. No lacerations. There is drainage. No swelling or tenderness. No foreign bodies. No mastoid tenderness. Tympanic membrane is not injected, not scarred, not perforated, not erythematous, not retracted and not bulging. Tympanic membrane mobility is normal. No middle ear effusion. No hemotympanum. Decreased hearing is noted.   Left Ear: Tympanic membrane, external ear and ear canal normal. No lacerations. No drainage, swelling or tenderness. No foreign bodies. No mastoid tenderness. Tympanic membrane is not injected, not scarred, not perforated, not erythematous, not retracted and not bulging. Tympanic membrane mobility is normal.  No middle ear effusion. No hemotympanum. Decreased hearing is noted.   Nose: Nose normal. No mucosal edema, rhinorrhea, nose lacerations, sinus tenderness or nasal deformity.   Mouth/Throat: Uvula is midline.     PROCEDURE NOTE:  AD white, wet debris is noted in EAC. Debris was removed by suctioning utilizing the assistance of binocular microscopy revealing EAC and TM WNL. The " patient tolerated this procedure without difficulty. The subjective decrease noted in hearing pre-cleaning was resolved post-cleaning.       Eyes: Conjunctivae, EOM and lids are normal. Pupils are equal, round, and reactive to light.   Neck: Trachea normal and normal range of motion. Neck supple. No spinous process tenderness and no muscular tenderness present. No no neck rigidity. No edema, no erythema and normal range of motion present. No thyroid mass and no thyromegaly present.   Pulmonary/Chest: Effort normal.   Abdominal: Soft.   Musculoskeletal: Normal range of motion.   Lymphadenopathy:        Head (right side): No submental, no submandibular, no tonsillar, no preauricular and no posterior auricular adenopathy present.        Head (left side): No submental, no submandibular, no tonsillar, no preauricular, no posterior auricular and no occipital adenopathy present.     He has no cervical adenopathy.   Neurological: He is alert and oriented to person, place, and time. No cranial nerve deficit or sensory deficit.   Skin: Skin is warm and dry.   Psychiatric: He has a normal mood and affect. His behavior is normal. Judgment and thought content normal.   Nursing note and vitals reviewed.      Assessment:       1. Otitis externa of right ear, unspecified chronicity, unspecified type        Plan:       Ciprodex Otic drops 4 drops to Right ear TID x 10 days. (called into pharmacy)  Advised to not let water enter ear canal: wear ear plug  or cotton ball with petroleum jelly to ear canal when showering.  Add one third cup each of distilled water, rubbing alcohol and white vinegar into a measuring cup, add one quarter teaspoon of table salt. Warm to body temperature and irrigate ear with solution as directed. Dry ear after with blow dryer.  RTC in 2 weeks or prn sooner if symptoms worsen.

## 2017-06-19 ENCOUNTER — TELEPHONE (OUTPATIENT)
Dept: SMOKING CESSATION | Facility: CLINIC | Age: 72
End: 2017-06-19

## 2017-06-19 DIAGNOSIS — F17.200 SMOKES AND MOTIVATED TO QUIT: Primary | ICD-10-CM

## 2017-06-19 RX ORDER — IBUPROFEN 200 MG
1 TABLET ORAL DAILY
Qty: 28 PATCH | Refills: 0 | Status: SHIPPED | OUTPATIENT
Start: 2017-06-19 | End: 2017-10-11

## 2017-06-19 NOTE — TELEPHONE ENCOUNTER
6/19/17   9:05 a.m.    Telephone call to patient to inform him his message was received about needing a re-order of patches.  Left message that I requested re-order and it should be ready by this afternoon.

## 2017-06-19 NOTE — PROGRESS NOTES
"Diabetes Education  Author: Della Roca RD  Date: 6/19/2017    Diabetes Education Visit  Diabetes Education Record Assessment/Progress: Initial    Diabetes Type  Diabetes Type : Type II    Diabetes History  Diabetes Diagnosis: 0-1 year (A1c 6.5)    Nutrition  Meal Planning:  (following Rice Solution Diet (book, lost 30# with it in the past); was eating a lot of donuts and cookies, lots of bread, recently cut these out; no sugary beverages)  Meal Plan 24 Hour Recall - Breakfast:  (oatmeal, fruit, coffee )  Meal Plan 24 Hour Recall - Lunch:  (rice, fruit, )  Meal Plan 24 Hour Recall - Dinner:  (meat, starch, vegetale, lots of fruit)  Meal Plan 24 Hour Recall - Snack:  (no snacking)    Monitoring   Self Monitoring :  (does not have meter)  Blood Glucose Logs: No    Exercise   Exercise Type: swimming  Frequency: 3-5 Times per week  Duration: 45 min    Current Diabetes Treatment   Current Treatment: Oral Medication (metformin 500 mg BID)    Social History  Preferred Learning Method: Face to Face  Primary Support: Self, Spouse (wife present at visit)    Barriers to Change  Barriers to Change: None  Learning Challenges : None    Readiness to Learn   Readiness to Learn : Acceptance    Cultural Influences  Cultural Influences: No    Diabetes Education Assessment/Progress  Acute Complications (preventing, detecting, and treating acute complications): Discussion, Individual Session, Written Materials Provided, Instructed (Reviewed hypo symptoms and how to treat. Discussed true vs false lows. Discussed pt not at risk for lows with current meds.)    Chronic Complications (preventing, detecting, and treating chronic complications): Discussion    Diabetes Disease Process (diabetes disease process and treatment options): Discussion    Nutrition (Incorporating nutritional management into one's lifestyle): Discussion, Individual Session, Written Materials Provided, Instructed (Pt adament on following diet prescribed in "The Rice " "Solution Diet" book. Scanned book; written by RD and MD; seems mostly appropriate for DM pt; somewhat high in fruit recommendations. Reviewed basic ADA diet. Discussed appropriate servings of carbs to have at meals/snacks.)    Physical Activity (incorporating physical activity into one's lifestyle): Discussion, Individual Session, Written Materials Provided, Instructed (Currently meeting recommendations. Encouraged continuance.)    Medications (states correct name, dose, onset, peak, duration, side effects & timing of meds): Discussion, Individual Session, Written Materials Provided, Instructed (Reviewed timing and MOA.)    Monitoring (monitoring blood glucose/other parameters & using results): Discussion, Individual Session, Written Materials Provided, Instructed (Provided meter and trained on usage. Instructed to SMBG once daily, several times per week, at varying times.)    Goal Setting and Problem Solving (verbalizes behavior change strategies & sets realistic goals): Discussion    Behavior Change (developing personal strategies to health & behavior change): Discussion    Psychosocial Issues (developing personal srategies to address psychosocial concerns): Discussion        Goals  Healthy Eating: Set (Limit carbs to <4 servings per meal)    Physical Activity: Set (Continue daily physical activity)    Monitoring: Set (SMBG several times per week)         Diabetes Care Plan/Intervention  Education Plan/Intervention: Individual Follow-Up DSMT    Diabetes Meal Plan  Restrictions: Restricted Carbohydrate  Carbohydrate Per Meal: 45-60g  Carbohydrate Per Snack : 7-15g    Education Units of Time   Time Spent: 60 min      Health Maintenance Topics with due status: Not Due       Topic Last Completion Date    Colonoscopy 03/07/2013    Pneumococcal (65+) 11/01/2016    Influenza Vaccine 02/04/2017    Lipid Panel 05/17/2017     Health Maintenance Due   Topic Date Due    TETANUS VACCINE  06/16/1964    Zoster Vaccine  " 06/16/2006    Abdominal Aortic Aneurysm Screening  06/16/2011

## 2017-06-26 RX ORDER — LISINOPRIL AND HYDROCHLOROTHIAZIDE 20; 25 MG/1; MG/1
TABLET ORAL
Qty: 90 TABLET | Refills: 3 | Status: SHIPPED | OUTPATIENT
Start: 2017-06-26 | End: 2017-09-19 | Stop reason: SDUPTHER

## 2017-06-28 ENCOUNTER — OFFICE VISIT (OUTPATIENT)
Dept: OTOLARYNGOLOGY | Facility: CLINIC | Age: 72
End: 2017-06-28
Payer: MEDICARE

## 2017-06-28 VITALS
HEART RATE: 73 BPM | DIASTOLIC BLOOD PRESSURE: 64 MMHG | BODY MASS INDEX: 43.1 KG/M2 | WEIGHT: 309 LBS | SYSTOLIC BLOOD PRESSURE: 105 MMHG

## 2017-06-28 DIAGNOSIS — H60.91 OTITIS EXTERNA OF RIGHT EAR, UNSPECIFIED CHRONICITY, UNSPECIFIED TYPE: Primary | ICD-10-CM

## 2017-06-28 PROCEDURE — 99999 PR PBB SHADOW E&M-EST. PATIENT-LVL III: CPT | Mod: PBBFAC,,, | Performed by: NURSE PRACTITIONER

## 2017-06-28 PROCEDURE — 92504 EAR MICROSCOPY EXAMINATION: CPT | Mod: S$GLB,,, | Performed by: NURSE PRACTITIONER

## 2017-06-28 PROCEDURE — 1159F MED LIST DOCD IN RCRD: CPT | Mod: S$GLB,,, | Performed by: NURSE PRACTITIONER

## 2017-06-28 PROCEDURE — 99213 OFFICE O/P EST LOW 20 MIN: CPT | Mod: 25,S$GLB,, | Performed by: NURSE PRACTITIONER

## 2017-06-28 PROCEDURE — 1125F AMNT PAIN NOTED PAIN PRSNT: CPT | Mod: S$GLB,,, | Performed by: NURSE PRACTITIONER

## 2017-06-28 RX ORDER — ACETIC ACID 20.65 MG/ML
4 SOLUTION AURICULAR (OTIC) DAILY
Qty: 3 ML | Refills: 2 | Status: SHIPPED | OUTPATIENT
Start: 2017-06-28 | End: 2017-07-08

## 2017-06-28 NOTE — PROGRESS NOTES
Subjective:       Patient ID: Jake Louie is a 72 y.o. male.    Chief Complaint: No chief complaint on file.    HPI   MrOlman Louie is a 73 y/o CM who presents for F/U of R OE.    Review of Systems   Constitutional: Negative for activity change, fatigue, fever and unexpected weight change.   HENT: Negative for congestion, dental problem, ear discharge, ear pain, facial swelling, hearing loss, mouth sores, nosebleeds, postnasal drip, rhinorrhea, sinus pressure, sneezing, sore throat, tinnitus, trouble swallowing and voice change.    Eyes: Negative for pain and visual disturbance.   Respiratory: Negative for cough, choking, chest tightness, shortness of breath, wheezing and stridor.    Cardiovascular: Negative for chest pain.   Gastrointestinal: Negative for nausea and vomiting.   Musculoskeletal: Negative for gait problem, neck pain and neck stiffness.   Skin: Negative for color change, rash and wound.   Allergic/Immunologic: Negative for environmental allergies.   Neurological: Negative for dizziness, seizures, syncope, facial asymmetry, speech difficulty, weakness, light-headedness, numbness and headaches.   Psychiatric/Behavioral: Negative for agitation, confusion and decreased concentration. The patient is not nervous/anxious.        Objective:       /64 (BP Location: Right arm, Patient Position: Sitting, BP Method: Automatic)   Pulse 73   Wt (!) 140.2 kg (309 lb)   BMI 43.10 kg/m²     Physical Exam   Constitutional: He is oriented to person, place, and time. He appears well-developed and well-nourished.   HENT:   Head: Normocephalic and atraumatic. Not macrocephalic and not microcephalic. Head is without raccoon's eyes, without Alexander's sign, without abrasion, without contusion, without laceration, without right periorbital erythema and without left periorbital erythema. Hair is normal.   Right Ear: Tympanic membrane, external ear and ear canal normal. No lacerations. No drainage,  swelling or tenderness. No foreign bodies. No mastoid tenderness. Tympanic membrane is not injected, not scarred, not perforated, not erythematous, not retracted and not bulging. Tympanic membrane mobility is normal. No middle ear effusion. No hemotympanum. No decreased hearing is noted.   Left Ear: Tympanic membrane, external ear and ear canal normal. No lacerations. No drainage, swelling or tenderness. No foreign bodies. No mastoid tenderness. Tympanic membrane is not injected, not scarred, not perforated, not erythematous, not retracted and not bulging. Tympanic membrane mobility is normal.  No middle ear effusion. No hemotympanum. No decreased hearing is noted.   Nose: Nose normal. No mucosal edema, rhinorrhea, nose lacerations, sinus tenderness or nasal deformity.   Mouth/Throat: Uvula is midline.     Procedure Note:    Patient was brought to the minor procedure room and using the operating microscope the right ear canal  was cleaned of wet debris and cerumen.  The forceps and suction were both used to perform this. Tympanic membrane intact. Pt tolerated well. There were no complications.    No evidence of AOM or OE.  Facial nerve Intact.   Eyes: Conjunctivae, EOM and lids are normal. Pupils are equal, round, and reactive to light.   Neck: Trachea normal and normal range of motion. Neck supple. No spinous process tenderness and no muscular tenderness present. No neck rigidity. No edema, no erythema and normal range of motion present. No thyroid mass and no thyromegaly present.   Pulmonary/Chest: Effort normal.   Abdominal: Soft.   Musculoskeletal: Normal range of motion.   Lymphadenopathy:        Head (right side): No submental, no submandibular, no tonsillar, no preauricular and no posterior auricular adenopathy present.        Head (left side): No submental, no submandibular, no tonsillar, no preauricular, no posterior auricular and no occipital adenopathy present.     He has no cervical adenopathy.    Neurological: He is alert and oriented to person, place, and time. No cranial nerve deficit or sensory deficit.   Skin: Skin is warm and dry.   Psychiatric: He has a normal mood and affect. His behavior is normal. Judgment and thought content normal.   Nursing note and vitals reviewed.      Assessment:       1. Otitis externa of right ear, unspecified chronicity, unspecified type        Plan:       Vosol 4 drops daily to Right ear x 10 days(maintenance).  Add one third cup each of distilled water, rubbing alcohol and white vinegar into a measuring cup, add one quarter teaspoon of table salt. Warm to body temperature and irrigate ear with solution as directed. Dry ear after with blow dryer (post-swimming).  RTC prn or sooner if symptoms are worsening.

## 2017-06-28 NOTE — PATIENT INSTRUCTIONS
Vosol 4 drops daily to Right ear x 10 days(maintenance).  Add one third cup each of distilled water, rubbing alcohol and white vinegar into a measuring cup, add one quarter teaspoon of table salt. Warm to body temperature and irrigate ear with solution as directed. Dry ear after with blow dryer (post-swimming).  RTC prn or sooner if symptoms are worsening.

## 2017-07-11 ENCOUNTER — TELEPHONE (OUTPATIENT)
Dept: SMOKING CESSATION | Facility: CLINIC | Age: 72
End: 2017-07-11

## 2017-07-11 NOTE — TELEPHONE ENCOUNTER
7/11/17   6:40 pm    Telephone call to patient to follow up on smoking cessation progress.  Left voice mail message #[phone] requesting a return call.

## 2017-07-17 ENCOUNTER — OFFICE VISIT (OUTPATIENT)
Dept: ORTHOPEDICS | Facility: CLINIC | Age: 72
End: 2017-07-17
Payer: MEDICARE

## 2017-07-17 VITALS — WEIGHT: 312.75 LBS | HEIGHT: 71 IN | BODY MASS INDEX: 43.78 KG/M2

## 2017-07-17 DIAGNOSIS — M17.0 PRIMARY OSTEOARTHRITIS OF BOTH KNEES: Primary | ICD-10-CM

## 2017-07-17 PROCEDURE — 99999 PR PBB SHADOW E&M-EST. PATIENT-LVL III: CPT | Mod: PBBFAC,,, | Performed by: PHYSICIAN ASSISTANT

## 2017-07-17 PROCEDURE — 99213 OFFICE O/P EST LOW 20 MIN: CPT | Mod: 25,S$GLB,, | Performed by: PHYSICIAN ASSISTANT

## 2017-07-17 PROCEDURE — 1159F MED LIST DOCD IN RCRD: CPT | Mod: S$GLB,,, | Performed by: PHYSICIAN ASSISTANT

## 2017-07-17 PROCEDURE — 1125F AMNT PAIN NOTED PAIN PRSNT: CPT | Mod: S$GLB,,, | Performed by: PHYSICIAN ASSISTANT

## 2017-07-17 PROCEDURE — 20610 DRAIN/INJ JOINT/BURSA W/O US: CPT | Mod: 50,S$GLB,, | Performed by: PHYSICIAN ASSISTANT

## 2017-07-17 RX ORDER — METHYLPREDNISOLONE ACETATE 80 MG/ML
80 INJECTION, SUSPENSION INTRA-ARTICULAR; INTRALESIONAL; INTRAMUSCULAR; SOFT TISSUE
Status: COMPLETED | OUTPATIENT
Start: 2017-07-17 | End: 2017-07-17

## 2017-07-17 RX ADMIN — METHYLPREDNISOLONE ACETATE 80 MG: 80 INJECTION, SUSPENSION INTRA-ARTICULAR; INTRALESIONAL; INTRAMUSCULAR; SOFT TISSUE at 11:07

## 2017-07-17 NOTE — PROGRESS NOTES
Subjective:      Patient ID: Jake Louie is a 72 y.o. male.    Chief Complaint: No chief complaint on file.    HPI  Patient returns to clinic regarding his bilateral knee pain R>L. He has a known h/o DJD. He received cortisone injections in April that provided some relief for a short time. He is not ready for TKA. He would like to repeat cortisone injections again.   Review of Systems   Constitution: Negative for chills, fever and night sweats.   Cardiovascular: Negative for chest pain.   Respiratory: Negative for cough and shortness of breath.    Hematologic/Lymphatic: Does not bruise/bleed easily.   Skin: Negative for color change.   Gastrointestinal: Negative for heartburn.   Genitourinary: Negative for dysuria.   Neurological: Negative for numbness and paresthesias.   Psychiatric/Behavioral: Negative for altered mental status.   Allergic/Immunologic: Negative for persistent infections.         Objective:            General    Vitals reviewed.  Constitutional: He is oriented to person, place, and time. He appears well-developed and well-nourished.   Cardiovascular: Normal rate.    Neurological: He is alert and oriented to person, place, and time.             Right Knee Exam:  ROM 0-120 degrees  No effusion  No warmth or erythema  TTP medial joint line     Left Knee Exam:  ROM 0-120 degrees  No effusion  No warmth or erythema  No TTP    X-ray: reviewed by myself. DJD both knees.      Assessment:       Encounter Diagnosis   Name Primary?    Primary osteoarthritis of both knees Yes          Plan:       Discussed treatment options with patient. He would like cortisone injections today. RTC prn.     PROCEDURE:  I have explained the risks, benefits, and alternatives of the procedure in detail.  The patient voices understanding and all questions have been answered.  The patient agrees to proceed as planned. So after I performed a sterile prep of the skin in the normal fashion the bilateral knee is injected using  a 22 gauge needle from the anteromedial approach with a combination of 4cc 1% plain lidocaine and 80 mg of depo medrol.  The patient is cautioned and immediate relief of pain is secondary to the local anesthetic and will be temporary.  After the anesthetic wears off there may be a increase in pain that may last for a few hours or a few days and they should use ice to help alleviate this flair up of pain.

## 2017-07-18 ENCOUNTER — LAB VISIT (OUTPATIENT)
Dept: LAB | Facility: HOSPITAL | Age: 72
End: 2017-07-18
Attending: INTERNAL MEDICINE
Payer: MEDICARE

## 2017-07-18 ENCOUNTER — TELEPHONE (OUTPATIENT)
Dept: INTERNAL MEDICINE | Facility: CLINIC | Age: 72
End: 2017-07-18

## 2017-07-18 ENCOUNTER — OFFICE VISIT (OUTPATIENT)
Dept: INTERNAL MEDICINE | Facility: CLINIC | Age: 72
End: 2017-07-18
Payer: MEDICARE

## 2017-07-18 VITALS
DIASTOLIC BLOOD PRESSURE: 78 MMHG | BODY MASS INDEX: 43.95 KG/M2 | HEIGHT: 71 IN | HEART RATE: 73 BPM | SYSTOLIC BLOOD PRESSURE: 139 MMHG | WEIGHT: 313.94 LBS

## 2017-07-18 DIAGNOSIS — G47.33 OSA (OBSTRUCTIVE SLEEP APNEA): ICD-10-CM

## 2017-07-18 DIAGNOSIS — R39.89 DARK YELLOW-COLORED URINE: ICD-10-CM

## 2017-07-18 DIAGNOSIS — I10 ESSENTIAL HYPERTENSION: ICD-10-CM

## 2017-07-18 DIAGNOSIS — E11.9 TYPE 2 DIABETES MELLITUS WITHOUT COMPLICATION, WITHOUT LONG-TERM CURRENT USE OF INSULIN: Primary | ICD-10-CM

## 2017-07-18 DIAGNOSIS — R39.15 URGENCY OF URINATION: ICD-10-CM

## 2017-07-18 DIAGNOSIS — E11.9 TYPE 2 DIABETES MELLITUS WITHOUT COMPLICATION, WITHOUT LONG-TERM CURRENT USE OF INSULIN: ICD-10-CM

## 2017-07-18 LAB
AMORPH CRY UR QL COMP ASSIST: NORMAL
ANION GAP SERPL CALC-SCNC: 6 MMOL/L
BILIRUB UR QL STRIP: NEGATIVE
BUN SERPL-MCNC: 21 MG/DL
CALCIUM SERPL-MCNC: 9.2 MG/DL
CAOX CRY UR QL COMP ASSIST: NORMAL
CHLORIDE SERPL-SCNC: 105 MMOL/L
CLARITY UR REFRACT.AUTO: ABNORMAL
CO2 SERPL-SCNC: 27 MMOL/L
COLOR UR AUTO: YELLOW
CREAT SERPL-MCNC: 1 MG/DL
EST. GFR  (AFRICAN AMERICAN): >60 ML/MIN/1.73 M^2
EST. GFR  (NON AFRICAN AMERICAN): >60 ML/MIN/1.73 M^2
ESTIMATED AVG GLUCOSE: 126 MG/DL
GLUCOSE SERPL-MCNC: 104 MG/DL
GLUCOSE UR QL STRIP: NEGATIVE
HBA1C MFR BLD HPLC: 6 %
HGB UR QL STRIP: NEGATIVE
HYALINE CASTS UR QL AUTO: 1 /LPF
KETONES UR QL STRIP: NEGATIVE
LEUKOCYTE ESTERASE UR QL STRIP: NEGATIVE
MICROSCOPIC COMMENT: NORMAL
NITRITE UR QL STRIP: NEGATIVE
PH UR STRIP: 5 [PH] (ref 5–8)
POTASSIUM SERPL-SCNC: 4.4 MMOL/L
PROT UR QL STRIP: NEGATIVE
RBC #/AREA URNS AUTO: 0 /HPF (ref 0–4)
SODIUM SERPL-SCNC: 138 MMOL/L
SP GR UR STRIP: 1.02 (ref 1–1.03)
SQUAMOUS #/AREA URNS AUTO: 0 /HPF
URATE CRY UR QL COMP ASSIST: NORMAL
URN SPEC COLLECT METH UR: ABNORMAL
UROBILINOGEN UR STRIP-ACNC: NEGATIVE EU/DL
WBC #/AREA URNS AUTO: 1 /HPF (ref 0–5)

## 2017-07-18 PROCEDURE — 4010F ACE/ARB THERAPY RXD/TAKEN: CPT | Mod: S$GLB,,, | Performed by: INTERNAL MEDICINE

## 2017-07-18 PROCEDURE — 1126F AMNT PAIN NOTED NONE PRSNT: CPT | Mod: S$GLB,,, | Performed by: INTERNAL MEDICINE

## 2017-07-18 PROCEDURE — 1159F MED LIST DOCD IN RCRD: CPT | Mod: S$GLB,,, | Performed by: INTERNAL MEDICINE

## 2017-07-18 PROCEDURE — 3044F HG A1C LEVEL LT 7.0%: CPT | Mod: S$GLB,,, | Performed by: INTERNAL MEDICINE

## 2017-07-18 PROCEDURE — 99214 OFFICE O/P EST MOD 30 MIN: CPT | Mod: S$GLB,,, | Performed by: INTERNAL MEDICINE

## 2017-07-18 PROCEDURE — 36415 COLL VENOUS BLD VENIPUNCTURE: CPT | Mod: PO

## 2017-07-18 PROCEDURE — 99999 PR PBB SHADOW E&M-EST. PATIENT-LVL III: CPT | Mod: PBBFAC,,, | Performed by: INTERNAL MEDICINE

## 2017-07-18 PROCEDURE — 80048 BASIC METABOLIC PNL TOTAL CA: CPT

## 2017-07-18 PROCEDURE — 83036 HEMOGLOBIN GLYCOSYLATED A1C: CPT

## 2017-07-18 PROCEDURE — 99499 UNLISTED E&M SERVICE: CPT | Mod: S$GLB,,, | Performed by: INTERNAL MEDICINE

## 2017-07-18 NOTE — PROGRESS NOTES
HPI:   72-year-old male comes in for follow-up visit.  From his visit May 23 metformin 500 mg twice a day was added.  He met with diabetic education, but the main thing that he change with his diet was stop eating doughnuts.  He emphatically states that he doesn't eat sweets.  He is exercising by doing a walking routine.    What concerns he has is that since being on metformin he notices that his urine is a dark yellow unsteady clear despite no change in fluid water intake.  There has been some urgency is low but more than usual    Since his last visit, he is met with sleep medicine regarding his BiPAP and has been no change.  He has met with orthopedics yesterday receiving a steroid injection in both knees and has done well.  He has also met with ENT for right otitis externa and was treated with eardrops.    PMH:  Hypertension  Hyperlipidemia  Type 2 diabetes  Complex sleep apnea  Chronic lower extremity edema  Morbid obesity    Social History:  Negative to alcohol and tobacco use    Current Medications:  Current Outpatient Prescriptions   Medication Sig Dispense Refill    ascorbic acid (VITAMIN C) 500 MG tablet Take 500 mg by mouth once daily.      ciprofloxacin HCl (CILOXAN) 0.3 % ophthalmic solution       lisinopril-hydrochlorothiazide (PRINZIDE,ZESTORETIC) 20-25 mg Tab TAKE ONE TABLET BY MOUTH EVERY DAY 90 tablet 3    metformin (GLUCOPHAGE) 500 MG tablet Take 1 tablet (500 mg total) by mouth 2 (two) times daily with meals. 60 tablet 6    multivitamin (THERAGRAN) per tablet Take 1 tablet by mouth once daily.        nicotine (NICODERM CQ) 21 mg/24 hr Place 1 patch onto the skin once daily. Generic preferred. 28 patch 0    nicotine polacrilex 2 MG Lozg Nicorette Mini Lozenge -one 2 mg lozenge by mouth as needed, max 5 in 6 hr period. 168 lozenge 0    pravastatin (PRAVACHOL) 20 MG tablet Take 1 tablet (20 mg total) by mouth once daily. 30 tablet 11    vitamin D 1000 units Tab Take 185 mg by mouth once  "daily.       No current facility-administered medications for this visit.          Review of Systems  Reports no chest pain, shortness of breath, abdominal pain, or diarrhea.  Bowel functions regular    Vitals:  /78   Pulse 73   Ht 5' 11" (1.803 m)   Wt (!) 142.4 kg (313 lb 15 oz)   BMI 43.79 kg/m²       Physical Exam  Neck no thyromegaly masses  Lungs clear breath sounds  Heart regular rate and rhythm with 1/6 systolic murmur at the base   abdominal exam  Soft, very protuberant, nontender        Visit Diagnosis:        ICD-10-CM ICD-9-CM   1. Type 2 diabetes mellitus without complication, without long-term current use of insulin E11.9 250.00   2. Essential hypertension I10 401.9   3. JUICE (obstructive sleep apnea) G47.33 327.23   4. Urgency of urination R39.15 788.63   5. Dark yellow-colored urine R39.89 788.99         Plan:      Orders Placed This Encounter   Procedures    Urine culture    Basic metabolic panel    Hemoglobin A1c    Urinalysis           Labs from today were reviewed basic metabolic profile was normal with glucose 107  Hemoglobin A1c 6.0 this is markedly improved  Continue with current medications, management of his diet and weight reduction      "

## 2017-07-18 NOTE — TELEPHONE ENCOUNTER
----- Message from Chana Currie sent at 7/18/2017  3:05 PM CDT -----  Contact: Self/ [phone]   Pt want to speak with someone in the office about his blood test. Please call and advise     Thank you

## 2017-07-19 ENCOUNTER — TELEPHONE (OUTPATIENT)
Dept: SMOKING CESSATION | Facility: CLINIC | Age: 72
End: 2017-07-19

## 2017-07-19 LAB — BACTERIA UR CULT: NO GROWTH

## 2017-07-19 NOTE — TELEPHONE ENCOUNTER
"7/19/17   4:40 pm    Telephone call to patient to follow up on smoking cessation progress.  Patient did not return my previous call.  He said he just got out of the dentist chair and his speech was garbled.  As far as quitting he said he was doing "pretty good" but was not any more specific than that.  I asked him to please call in a few days when he was feeling better to schedule an appointment.  "

## 2017-08-21 ENCOUNTER — TELEPHONE (OUTPATIENT)
Dept: OPTOMETRY | Facility: CLINIC | Age: 72
End: 2017-08-21

## 2017-08-29 ENCOUNTER — DOCUMENTATION ONLY (OUTPATIENT)
Dept: REHABILITATION | Facility: HOSPITAL | Age: 72
End: 2017-08-29

## 2017-08-29 NOTE — PROGRESS NOTES
PHYSICAL THERAPY DISCHARGE:    This patient was originally evaluated at our facility on: 11/21/16         Pt attended PT for a total of 9 Visits receiving: Manual Therapy, Therex, Postural Education, Body Mechanics Training, Home Exercise Instruction     This patient's last visit occurred on: 12/21/16      Short term goals were achieved: no formal re-evaluation secondary to non-compliance    Long term goals were achieved: no formal re-evaluation secondary to non-compliance    Pt was DC'd from our care secondary to: non-compliance       Therapist: Ab Ford, PT  8/29/2017

## 2017-09-18 ENCOUNTER — PATIENT MESSAGE (OUTPATIENT)
Dept: INTERNAL MEDICINE | Facility: CLINIC | Age: 72
End: 2017-09-18

## 2017-09-21 RX ORDER — PRAVASTATIN SODIUM 20 MG/1
20 TABLET ORAL DAILY
Qty: 90 TABLET | Refills: 3 | Status: SHIPPED | OUTPATIENT
Start: 2017-09-21 | End: 2018-10-10 | Stop reason: SDUPTHER

## 2017-09-21 RX ORDER — METFORMIN HYDROCHLORIDE 500 MG/1
500 TABLET ORAL 2 TIMES DAILY WITH MEALS
Qty: 180 TABLET | Refills: 3 | Status: SHIPPED | OUTPATIENT
Start: 2017-09-21 | End: 2018-10-10 | Stop reason: SDUPTHER

## 2017-09-21 RX ORDER — LISINOPRIL AND HYDROCHLOROTHIAZIDE 20; 25 MG/1; MG/1
1 TABLET ORAL DAILY
Qty: 90 TABLET | Refills: 3 | Status: SHIPPED | OUTPATIENT
Start: 2017-09-21 | End: 2018-10-10 | Stop reason: SDUPTHER

## 2017-09-29 ENCOUNTER — PATIENT OUTREACH (OUTPATIENT)
Dept: ADMINISTRATIVE | Facility: HOSPITAL | Age: 72
End: 2017-09-29

## 2017-10-06 ENCOUNTER — PATIENT MESSAGE (OUTPATIENT)
Dept: INTERNAL MEDICINE | Facility: CLINIC | Age: 72
End: 2017-10-06

## 2017-10-06 DIAGNOSIS — D49.2 ABNORMAL SKIN GROWTH: Primary | ICD-10-CM

## 2017-10-06 NOTE — TELEPHONE ENCOUNTER
, could you put in a request to see Dr. Ross in Dermatology? I would like her to look at this growth on my right hand. I would like to have it removed as soon as possible. Thanks, Jake

## 2017-10-11 ENCOUNTER — INITIAL CONSULT (OUTPATIENT)
Dept: DERMATOLOGY | Facility: CLINIC | Age: 72
End: 2017-10-11
Payer: MEDICARE

## 2017-10-11 DIAGNOSIS — D48.9 NEOPLASM OF UNCERTAIN BEHAVIOR: Primary | ICD-10-CM

## 2017-10-11 PROCEDURE — 99499 UNLISTED E&M SERVICE: CPT | Mod: S$GLB,,, | Performed by: DERMATOLOGY

## 2017-10-11 PROCEDURE — 11100 PR BIOPSY OF SKIN LESION: CPT | Mod: S$GLB,,, | Performed by: DERMATOLOGY

## 2017-10-11 PROCEDURE — 99999 PR PBB SHADOW E&M-EST. PATIENT-LVL III: CPT | Mod: PBBFAC,,, | Performed by: DERMATOLOGY

## 2017-10-11 PROCEDURE — 88305 TISSUE EXAM BY PATHOLOGIST: CPT | Performed by: PATHOLOGY

## 2017-10-11 RX ORDER — MUPIROCIN 20 MG/G
OINTMENT TOPICAL
Qty: 30 G | Refills: 2 | Status: SHIPPED | OUTPATIENT
Start: 2017-10-11 | End: 2020-08-27

## 2017-10-11 NOTE — LETTER
October 11, 2017      Artem Hood MD  [address]  [facility] [address]           [place] [facility]  [address]  [place] [address]  Phone: [phone]  Fax: [phone]          Patient: Jake Louie   MR Number: [id number]   YOB: 1945   Date of Visit: 10/11/2017       Dear Dr. Artem Hood:    Thank you for referring Jake Louie to me for evaluation. Attached you will find relevant portions of my assessment and plan of care.    If you have questions, please do not hesitate to call me. I look forward to following Jake Louie along with you.    Sincerely,    Kaykay Leyva MD    Enclosure  CC:  No Recipients    If you would like to receive this communication electronically, please contact [email or web address] or [phone] to request more information on [employer] access.    For providers and/or their staff who would like to refer a patient to [facility], please contact us through our one-stop-shop provider referral line, [facility], at [phone].    If you feel you have received this communication in error or would no longer like to receive these types of communications, please e-mail [email or web address]

## 2017-10-11 NOTE — PROGRESS NOTES
Subjective:       Patient ID:  Jake Louie is a 72 y.o. male who presents for   Chief Complaint   Patient presents with    Lesion     right hand     Pt presents for lesion to right hand x at least a year.  Growing.  Tender, particularly with pressure. Wants removed. Pt denies any trauma to this area      Lesion         Review of Systems   Constitutional: Negative for fever, chills, fatigue and malaise.   Skin: Negative for tendency to form keloidal scars.   Hematologic/Lymphatic: Does not bruise/bleed easily.        Objective:    Physical Exam   Constitutional: He appears well-developed and well-nourished. No distress.   Neurological: He is alert and oriented to person, place, and time. He is not disoriented.   Psychiatric: He has a normal mood and affect.   Skin:   Areas Examined (abnormalities noted in diagram):   RUE Inspected                 Diagram Legend     Erythematous scaling macule/papule c/w actinic keratosis       Vascular papule c/w angioma      Pigmented verrucoid papule/plaque c/w seborrheic keratosis      Yellow umbilicated papule c/w sebaceous hyperplasia      Irregularly shaped tan macule c/w lentigo     1-2 mm smooth white papules consistent with Milia      Movable subcutaneous cyst with punctum c/w epidermal inclusion cyst      Subcutaneous movable cyst c/w pilar cyst      Firm pink to brown papule c/w dermatofibroma      Pedunculated fleshy papule(s) c/w skin tag(s)      Evenly pigmented macule c/w junctional nevus     Mildly variegated pigmented, slightly irregular-bordered macule c/w mildly atypical nevus      Flesh colored to evenly pigmented papule c/w intradermal nevus       Pink pearly papule/plaque c/w basal cell carcinoma      Erythematous hyperkeratotic cursted plaque c/w SCC      Surgical scar with no sign of skin cancer recurrence      Open and closed comedones      Inflammatory papules and pustules      Verrucoid papule consistent consistent with wart     Erythematous  eczematous patches and plaques     Dystrophic onycholytic nail with subungual debris c/w onychomycosis     Umbilicated papule    Erythematous-base heme-crusted tan verrucoid plaque consistent with inflamed seborrheic keratosis     Erythematous Silvery Scaling Plaque c/w Psoriasis     See annotation      Assessment / Plan:      Pathology Orders:      Normal Orders This Visit    Tissue Specimen To Pathology, Dermatology     Questions:    Directional Terms:  Other(comment)    Clinical information:  r/o wart v other    Specific Site:  right dorsal hand        Neoplasm of uncertain behavior  Shave biopsy procedure note:    Shave biopsy performed after verbal consent including risk of infection, scar, recurrence, need for additional treatment of site. Area prepped with alcohol, anesthetized with approximately 1.0cc of 1% lidocaine with epinephrine. Lesional tissue shaved with razor blade. Hemostasis achieved with application of aluminum chloride followed by hyfrecation. No complications. Dressing applied. Wound care explained.      -     Tissue Specimen To Pathology, Dermatology    Other orders  -     mupirocin (BACTROBAN) 2 % ointment; AAA bid  Dispense: 30 g; Refill: 2             Return if symptoms worsen or fail to improve.

## 2017-10-16 ENCOUNTER — PATIENT MESSAGE (OUTPATIENT)
Dept: DERMATOLOGY | Facility: CLINIC | Age: 72
End: 2017-10-16

## 2017-11-14 ENCOUNTER — TELEPHONE (OUTPATIENT)
Dept: INTERNAL MEDICINE | Facility: CLINIC | Age: 72
End: 2017-11-14

## 2017-11-14 NOTE — TELEPHONE ENCOUNTER
----- Message from Cherelle Teixeira sent at 11/13/2017  1:20 PM CST -----  Contact: Spouse/[phone]  She wants a callback concerning 's eye check due to diabetes.    Thank you

## 2017-12-15 ENCOUNTER — TELEPHONE (OUTPATIENT)
Dept: INTERNAL MEDICINE | Facility: CLINIC | Age: 72
End: 2017-12-15

## 2017-12-15 DIAGNOSIS — E11.9 TYPE 2 DIABETES MELLITUS WITHOUT COMPLICATION, UNSPECIFIED LONG TERM INSULIN USE STATUS: Primary | ICD-10-CM

## 2017-12-26 ENCOUNTER — IMMUNIZATION (OUTPATIENT)
Dept: INTERNAL MEDICINE | Facility: CLINIC | Age: 72
End: 2017-12-26
Payer: MEDICARE

## 2017-12-26 PROCEDURE — 90662 IIV NO PRSV INCREASED AG IM: CPT | Mod: S$GLB,,, | Performed by: INTERNAL MEDICINE

## 2017-12-26 PROCEDURE — G0008 ADMIN INFLUENZA VIRUS VAC: HCPCS | Mod: S$GLB,,, | Performed by: INTERNAL MEDICINE

## 2018-01-15 ENCOUNTER — OFFICE VISIT (OUTPATIENT)
Dept: INTERNAL MEDICINE | Facility: CLINIC | Age: 73
End: 2018-01-15
Payer: MEDICARE

## 2018-01-15 ENCOUNTER — NURSE TRIAGE (OUTPATIENT)
Dept: ADMINISTRATIVE | Facility: CLINIC | Age: 73
End: 2018-01-15

## 2018-01-15 VITALS
SYSTOLIC BLOOD PRESSURE: 143 MMHG | WEIGHT: 307.13 LBS | HEART RATE: 65 BPM | TEMPERATURE: 98 F | BODY MASS INDEX: 43 KG/M2 | DIASTOLIC BLOOD PRESSURE: 80 MMHG | HEIGHT: 71 IN

## 2018-01-15 DIAGNOSIS — J45.909 ACUTE ASTHMATIC BRONCHITIS: Primary | ICD-10-CM

## 2018-01-15 DIAGNOSIS — J32.9 SINUSITIS, UNSPECIFIED CHRONICITY, UNSPECIFIED LOCATION: ICD-10-CM

## 2018-01-15 PROCEDURE — 99999 PR PBB SHADOW E&M-EST. PATIENT-LVL IV: CPT | Mod: PBBFAC,,, | Performed by: INTERNAL MEDICINE

## 2018-01-15 PROCEDURE — 99499 UNLISTED E&M SERVICE: CPT | Mod: S$GLB,,, | Performed by: INTERNAL MEDICINE

## 2018-01-15 PROCEDURE — 99214 OFFICE O/P EST MOD 30 MIN: CPT | Mod: S$GLB,,, | Performed by: INTERNAL MEDICINE

## 2018-01-15 RX ORDER — AMOXICILLIN 875 MG/1
875 TABLET, FILM COATED ORAL 2 TIMES DAILY
Qty: 20 TABLET | Refills: 0 | Status: SHIPPED | OUTPATIENT
Start: 2018-01-15 | End: 2018-01-30

## 2018-01-15 RX ORDER — PREDNISONE 20 MG/1
TABLET ORAL
Qty: 12 TABLET | Refills: 0 | Status: SHIPPED | OUTPATIENT
Start: 2018-01-15 | End: 2018-01-30

## 2018-01-15 NOTE — PROGRESS NOTES
REASON FOR VISIT:  This is a 72-year-old male.  For a week, he has been   coughing.  He has been congested in his head and his chest, and coughing up   yellow mucus.  At times he might be short of breath and will hear wheezing, and   both ears feel full.  During this time, there has been no fever.    PAST MEDICAL HISTORY:  Type 2 diabetes which is under good control.  Hypertension.  Hyperlipidemia.    MEDICATIONS:  List was reviewed and per MedCard.    PHYSICAL EXAMINATION:  VITAL SIGNS:  Weight is 307, blood pressure 142/80.  HEENT:  Slight hyperemia in both tympanic membranes.  Nasal mucosa is swollen   and congested.  Oropharynx hyperemic.  NECK:  No adenopathy.  LUNGS:  Clear with rhonchi and wheeze in left upper lung.  HEART:  Regular rate and rhythm.    IMPRESSION:  1.  Acute asthmatic bronchitis.  2.  Sinusitis.    PLAN:  Amoxicillin 875 mg twice a day for 10 days and prednisone taper over the   next eight days.  Also I asked him to take over-the-counter Mucinex.      JAM/HN  dd: 01/15/2018 12:05:25 (CST)  td: 01/16/2018 04:19:42 (CST)  Doc ID   #[id number]  Job ID #[id number]    CC:

## 2018-01-15 NOTE — TELEPHONE ENCOUNTER
#90 sent 6-13-17.  He needs to be seen before more refills.  Heidy CARROLL RN       Pt reports cough, congestion for about a month. Pt has tried OTC meds with no relief. Denies fever. Pt is wanting an appt to see PCP today- sent to scheduling for an appt    Reason for Disposition   SEVERE coughing spells (e.g., whooping sound after coughing, vomiting after coughing)    Protocols used: ST COUGH-A-OH

## 2018-01-16 ENCOUNTER — PATIENT MESSAGE (OUTPATIENT)
Dept: INTERNAL MEDICINE | Facility: CLINIC | Age: 73
End: 2018-01-16

## 2018-01-16 ENCOUNTER — TELEPHONE (OUTPATIENT)
Dept: INTERNAL MEDICINE | Facility: CLINIC | Age: 73
End: 2018-01-16

## 2018-01-16 RX ORDER — MUPIROCIN 20 MG/G
OINTMENT TOPICAL
Qty: 44 G | Refills: 0 | Status: SHIPPED | OUTPATIENT
Start: 2018-01-16 | End: 2019-04-11

## 2018-01-16 RX ORDER — ZALEPLON 10 MG/1
10 CAPSULE ORAL NIGHTLY
Qty: 30 CAPSULE | Refills: 0 | Status: SHIPPED | OUTPATIENT
Start: 2018-01-16 | End: 2018-01-17

## 2018-01-17 NOTE — TELEPHONE ENCOUNTER
----- Message from Kim Mckeon sent at 1/17/2018  9:03 AM CST -----  Contact: alexandra/Rosangela pharm/[phone]  Pharm states that zaleplon not covered but belsomra is covered by insurance requesting a call back to discuss.    Please advise thanks

## 2018-01-17 NOTE — TELEPHONE ENCOUNTER
Pharm states that zaleplon not covered but belsomra is covered by insurance requesting a call back to discuss.     Please advise thanks

## 2018-01-27 ENCOUNTER — NURSE TRIAGE (OUTPATIENT)
Dept: ADMINISTRATIVE | Facility: CLINIC | Age: 73
End: 2018-01-27

## 2018-01-27 ENCOUNTER — OFFICE VISIT (OUTPATIENT)
Dept: URGENT CARE | Facility: CLINIC | Age: 73
End: 2018-01-27
Payer: MEDICARE

## 2018-01-27 VITALS
HEART RATE: 78 BPM | DIASTOLIC BLOOD PRESSURE: 74 MMHG | TEMPERATURE: 99 F | OXYGEN SATURATION: 97 % | HEIGHT: 71 IN | BODY MASS INDEX: 42 KG/M2 | SYSTOLIC BLOOD PRESSURE: 132 MMHG | WEIGHT: 300 LBS | RESPIRATION RATE: 16 BRPM

## 2018-01-27 DIAGNOSIS — M25.521 RIGHT ELBOW PAIN: Primary | ICD-10-CM

## 2018-01-27 DIAGNOSIS — S50.01XA CONTUSION OF RIGHT ELBOW, INITIAL ENCOUNTER: ICD-10-CM

## 2018-01-27 PROCEDURE — 99213 OFFICE O/P EST LOW 20 MIN: CPT | Mod: S$GLB,,, | Performed by: INTERNAL MEDICINE

## 2018-01-27 NOTE — PROGRESS NOTES
"Subjective:       Patient ID: Jake Louie is a 72 y.o. male.    Vitals:  height is 5' 11" (1.803 m) and weight is 136.1 kg (300 lb). His temperature is 98.7 °F (37.1 °C). His blood pressure is 132/74 and his pulse is 78. His respiration is 16 and oxygen saturation is 97%.     Chief Complaint: Trauma (pt fell on his right elbow earlier today)    Pt said his right elbow hurts and the pain radiates down to his wrist and up to his shoulder.      Trauma   The incident occurred 3 to 6 hours ago. The injury mechanism was a fall. The context of the injury is unknown. No protective equipment was used. There is an injury to the right elbow. The pain is moderate. It is unlikely that a foreign body is present. Pertinent negatives include no abdominal pain, chest pain, neck pain, numbness or weakness. There have been no prior injuries to these areas. His tetanus status is unknown.     Review of Systems   Constitution: Negative for weakness and malaise/fatigue.   HENT: Negative for nosebleeds.    Cardiovascular: Negative for chest pain and syncope.   Respiratory: Negative for shortness of breath.    Musculoskeletal: Positive for falls and joint pain. Negative for back pain and neck pain.   Gastrointestinal: Negative for abdominal pain.   Genitourinary: Negative for hematuria.   Neurological: Negative for dizziness and numbness.       Objective:      Physical Exam   Constitutional: He appears well-developed and well-nourished.   HENT:   Head: Normocephalic and atraumatic.   Musculoskeletal:   R elbow pain with ROM and palpation olecranon   Vitals reviewed.      Assessment:       1. Right elbow pain    2. Contusion of right elbow, initial encounter        Plan:         Right elbow pain  -     X-Ray Elbow Complete Right; Future; Expected date: 01/27/2018    Contusion of right elbow, initial encounter          "
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
## 2018-01-29 ENCOUNTER — TELEPHONE (OUTPATIENT)
Dept: INTERNAL MEDICINE | Facility: CLINIC | Age: 73
End: 2018-01-29
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
## 2018-01-29 NOTE — TELEPHONE ENCOUNTER
Pt has an appt vic he said he went to the  and he feels that by vic he will not need the appt so he will call in the morning and cancel

## 2018-01-30 ENCOUNTER — OFFICE VISIT (OUTPATIENT)
Dept: INTERNAL MEDICINE | Facility: CLINIC | Age: 73
End: 2018-01-30
Payer: MEDICARE

## 2018-01-30 VITALS
WEIGHT: 309.75 LBS | SYSTOLIC BLOOD PRESSURE: 136 MMHG | HEIGHT: 71 IN | DIASTOLIC BLOOD PRESSURE: 79 MMHG | HEART RATE: 77 BPM | BODY MASS INDEX: 43.36 KG/M2

## 2018-01-30 DIAGNOSIS — M77.12 LATERAL EPICONDYLITIS OF LEFT ELBOW: Primary | ICD-10-CM

## 2018-01-30 PROCEDURE — 99999 PR PBB SHADOW E&M-EST. PATIENT-LVL III: CPT | Mod: PBBFAC,,, | Performed by: INTERNAL MEDICINE

## 2018-01-30 PROCEDURE — 1159F MED LIST DOCD IN RCRD: CPT | Mod: S$GLB,,, | Performed by: INTERNAL MEDICINE

## 2018-01-30 PROCEDURE — 3008F BODY MASS INDEX DOCD: CPT | Mod: S$GLB,,, | Performed by: INTERNAL MEDICINE

## 2018-01-30 PROCEDURE — 99213 OFFICE O/P EST LOW 20 MIN: CPT | Mod: S$GLB,,, | Performed by: INTERNAL MEDICINE

## 2018-01-30 PROCEDURE — 1126F AMNT PAIN NOTED NONE PRSNT: CPT | Mod: S$GLB,,, | Performed by: INTERNAL MEDICINE

## 2018-01-30 NOTE — PROGRESS NOTES
REASON FOR VISIT:  This is a 72-year-old male.  On January 27th, he had a fall   where he landed on both hands to brace his fall and then he fell on his right   elbow.  He felt a shock going up.  He went to an Urgent Care where he was   diagnosed with contusion and elbow x-ray was normal.  He was given a   prescription of an anti-inflammatory, which he cannot name, actually he had this   at home.  He says he is feeling better.  He is now able to squeeze his hand at   the wrist joint, has little pain at the elbow.    PHYSICAL EXAMINATION:  VITAL SIGNS:  Per EPIC.  EXTREMITIES:  There is some ecchymosis over the elbow over the medial aspect.    There is pain at various times with supination or pronation of the hand and also   of the arm at the elbow joint.  He is tender over the epicondyle.    IMPRESSION:  Lateral epicondylitis.    PLAN:  Continue with the use of ice packs that he has been doing and an   anti-inflammatory.          /mingo [id number] review        BETY/SILVIO  dd: 01/30/2018 11:24:35 (CST)  td: 01/31/2018 04:43:30 (CST)  Doc ID   #[id number]  Job ID #[id number]    CC:

## 2018-02-15 ENCOUNTER — OFFICE VISIT (OUTPATIENT)
Dept: INTERNAL MEDICINE | Facility: CLINIC | Age: 73
End: 2018-02-15
Payer: MEDICARE

## 2018-02-15 ENCOUNTER — TELEPHONE (OUTPATIENT)
Dept: INTERNAL MEDICINE | Facility: CLINIC | Age: 73
End: 2018-02-15

## 2018-02-15 VITALS
DIASTOLIC BLOOD PRESSURE: 72 MMHG | WEIGHT: 308.19 LBS | HEIGHT: 71 IN | SYSTOLIC BLOOD PRESSURE: 140 MMHG | BODY MASS INDEX: 43.15 KG/M2 | HEART RATE: 65 BPM

## 2018-02-15 DIAGNOSIS — M75.22 BICIPITAL TENDINITIS OF SHOULDER, LEFT: ICD-10-CM

## 2018-02-15 DIAGNOSIS — E11.9 TYPE 2 DIABETES MELLITUS WITHOUT COMPLICATION: ICD-10-CM

## 2018-02-15 DIAGNOSIS — L03.113 CELLULITIS OF RIGHT UPPER EXTREMITY: ICD-10-CM

## 2018-02-15 DIAGNOSIS — M70.21 OLECRANON BURSITIS OF RIGHT ELBOW: Primary | ICD-10-CM

## 2018-02-15 DIAGNOSIS — M77.11 LATERAL EPICONDYLITIS OF RIGHT ELBOW: ICD-10-CM

## 2018-02-15 PROCEDURE — 99999 PR PBB SHADOW E&M-EST. PATIENT-LVL III: CPT | Mod: PBBFAC,,, | Performed by: INTERNAL MEDICINE

## 2018-02-15 PROCEDURE — 96372 THER/PROPH/DIAG INJ SC/IM: CPT | Mod: S$GLB,,, | Performed by: INTERNAL MEDICINE

## 2018-02-15 PROCEDURE — 99214 OFFICE O/P EST MOD 30 MIN: CPT | Mod: 25,S$GLB,, | Performed by: INTERNAL MEDICINE

## 2018-02-15 PROCEDURE — 1159F MED LIST DOCD IN RCRD: CPT | Mod: S$GLB,,, | Performed by: INTERNAL MEDICINE

## 2018-02-15 PROCEDURE — 3008F BODY MASS INDEX DOCD: CPT | Mod: S$GLB,,, | Performed by: INTERNAL MEDICINE

## 2018-02-15 PROCEDURE — 1126F AMNT PAIN NOTED NONE PRSNT: CPT | Mod: S$GLB,,, | Performed by: INTERNAL MEDICINE

## 2018-02-15 RX ORDER — DICLOFENAC SODIUM 75 MG/1
75 TABLET, DELAYED RELEASE ORAL 2 TIMES DAILY
Qty: 20 TABLET | Refills: 0 | Status: SHIPPED | OUTPATIENT
Start: 2018-02-15 | End: 2018-02-21 | Stop reason: SDUPTHER

## 2018-02-15 RX ORDER — TRIAMCINOLONE ACETONIDE 40 MG/ML
40 INJECTION, SUSPENSION INTRA-ARTICULAR; INTRAMUSCULAR
Status: COMPLETED | OUTPATIENT
Start: 2018-02-15 | End: 2018-02-15

## 2018-02-15 RX ORDER — KETOROLAC TROMETHAMINE 30 MG/ML
60 INJECTION, SOLUTION INTRAMUSCULAR; INTRAVENOUS
Status: COMPLETED | OUTPATIENT
Start: 2018-02-15 | End: 2018-02-15

## 2018-02-15 RX ORDER — SULFAMETHOXAZOLE AND TRIMETHOPRIM 800; 160 MG/1; MG/1
1 TABLET ORAL 2 TIMES DAILY
Qty: 20 TABLET | Refills: 0 | Status: SHIPPED | OUTPATIENT
Start: 2018-02-15 | End: 2018-02-25

## 2018-02-15 RX ADMIN — TRIAMCINOLONE ACETONIDE 40 MG: 40 INJECTION, SUSPENSION INTRA-ARTICULAR; INTRAMUSCULAR at 12:02

## 2018-02-15 RX ADMIN — KETOROLAC TROMETHAMINE 60 MG: 30 INJECTION, SOLUTION INTRAMUSCULAR; INTRAVENOUS at 12:02

## 2018-02-15 NOTE — PROGRESS NOTES
REASON FOR VISIT:  This is a 72-year-old male.  This is in reference to his   visit with me on January 30th.  He is still having pain involving his left elbow   that now extends up to the arm near the shoulder.  He feels more of the pain   when he pushes his hand on something.  The pain involving his wrists, hand, and   forearm has resolved.  He has been taking ibuprofen 800 mg up to three times a   day or twice a day and use of ice pack.  This started happening after he had a   fall and he also landed on his elbow.  He went to Urgent Care where an x-ray was   performed that did reveal some spurring over the medial epicondyle and   degenerative changes at the ulna, humeral side of the joint, but there was no   evidence of fracture or dislocation.    PAST MEDICAL HISTORY:   Hypertension.  Hyperlipidemia.  Type 2 diabetes.  Sleep apnea.  Lower extremity edema.    MEDICATIONS:  List per MedCard.    PHYSICAL EXAMINATION:  VITAL SIGNS:  His weight is 180 pounds.  Blood pressure 140/72.  MUSCULOSKELETAL:  He has 1+ biceps, triceps reflexes.  Minimally tender over the   lateral epicondyle on the left, probably what is more noticeable is significant   soft tissue swelling and erythema and some warmth right over the olecranon   process up to the distal humerus.  Minimal tenderness over the bicipital tendon.    There is currently no pain right now when adduct or rotate the arm at the   shoulder joint.    IMPRESSION:  1.  Olecranon bursitis with cellulitis.  2.  Lateral epicondylitis.  3.  Bicipital tendinitis.    PLAN:  We will give a prescription of diclofenac 75 mg twice a day for 10 days,   Toradol and  Kenalog injection today.  We will start him on Bactrim DS twice a   day to take for the next 10 days.  If there is no improvement, we will need to   make an appointment for him to meet with Orthopedics.            /mingo [id number] kimberly(s)        JAM/HN  dd: 02/15/2018 11:58:48 (CST)  td: 02/15/2018 21:43:42 (CST)  Doc ID    #[id number]  Job ID #[id number]    CC:

## 2018-02-16 ENCOUNTER — TELEPHONE (OUTPATIENT)
Dept: INTERNAL MEDICINE | Facility: CLINIC | Age: 73
End: 2018-02-16

## 2018-02-16 DIAGNOSIS — R41.3 MEMORY LOSS: Primary | ICD-10-CM

## 2018-02-16 NOTE — TELEPHONE ENCOUNTER
I can arrange for a neuropsychological testing which is a very formalize test to evaluate cognitive function

## 2018-02-16 NOTE — TELEPHONE ENCOUNTER
Pt wife called in regards to checking the status of her first message that she left in regard to. Pt wife was reading the avs and it says left extremity but it should be right. On the synopsis of the visit, everything says left and it should be right. Please call her .       Please advise

## 2018-02-16 NOTE — TELEPHONE ENCOUNTER
Spoke to pt wife and advised the visit was corrected.        She also stated she has spoke to you in the past about the pt memory. She stated it has gotten worse, he is forgetting things and getting frustrated with himself . She wants to know if there is anyone that can evaluate him or do you recommend something .      Please advise

## 2018-02-16 NOTE — TELEPHONE ENCOUNTER
Left vm for pt/pt wife advised referral was put in, that department will contact them to schedule.

## 2018-02-19 ENCOUNTER — TELEPHONE (OUTPATIENT)
Dept: ORTHOPEDICS | Facility: CLINIC | Age: 73
End: 2018-02-19

## 2018-02-19 NOTE — TELEPHONE ENCOUNTER
----- Message from Anirudh Morris sent at 2/19/2018 12:09 PM CST -----  Contact: Ms. Louie/wife   Ms. Louie called in stating that the pt injured his rt arm and would like to know if Alayna would be able to treat his injury. Ms. Louie can be reached at [phone].

## 2018-02-20 ENCOUNTER — TELEPHONE (OUTPATIENT)
Dept: INTERNAL MEDICINE | Facility: CLINIC | Age: 73
End: 2018-02-20

## 2018-02-20 NOTE — TELEPHONE ENCOUNTER
Requesting a call from you. Have a few questions to ask you.     Please call and advise.     Thank You

## 2018-02-20 NOTE — TELEPHONE ENCOUNTER
----- Message from Sloane Meredith sent at 2/20/2018  8:37 AM CST -----  Contact: spouse [phone]  Requesting a call from you. Have a few questions to ask you.    Please call and advise.    Thank You

## 2018-02-21 ENCOUNTER — HOSPITAL ENCOUNTER (OUTPATIENT)
Dept: RADIOLOGY | Facility: HOSPITAL | Age: 73
Discharge: HOME | End: 2018-02-21
Attending: PHYSICIAN ASSISTANT
Payer: MEDICARE

## 2018-02-21 ENCOUNTER — OFFICE VISIT (OUTPATIENT)
Dept: ORTHOPEDICS | Facility: CLINIC | Age: 73
End: 2018-02-21
Payer: MEDICARE

## 2018-02-21 VITALS — BODY MASS INDEX: 41.59 KG/M2 | WEIGHT: 298.19 LBS

## 2018-02-21 DIAGNOSIS — S46.311A TRICEPS TENDON RUPTURE, RIGHT, INITIAL ENCOUNTER: ICD-10-CM

## 2018-02-21 DIAGNOSIS — M25.511 RIGHT SHOULDER PAIN, UNSPECIFIED CHRONICITY: ICD-10-CM

## 2018-02-21 DIAGNOSIS — M25.511 RIGHT SHOULDER PAIN, UNSPECIFIED CHRONICITY: Primary | ICD-10-CM

## 2018-02-21 DIAGNOSIS — M25.521 RIGHT ELBOW PAIN: ICD-10-CM

## 2018-02-21 PROCEDURE — 73030 X-RAY EXAM OF SHOULDER: CPT | Mod: TC,RT

## 2018-02-21 PROCEDURE — 1159F MED LIST DOCD IN RCRD: CPT | Mod: S$GLB,,, | Performed by: PHYSICIAN ASSISTANT

## 2018-02-21 PROCEDURE — 3008F BODY MASS INDEX DOCD: CPT | Mod: S$GLB,,, | Performed by: PHYSICIAN ASSISTANT

## 2018-02-21 PROCEDURE — 99213 OFFICE O/P EST LOW 20 MIN: CPT | Mod: S$GLB,,, | Performed by: PHYSICIAN ASSISTANT

## 2018-02-21 PROCEDURE — 73030 X-RAY EXAM OF SHOULDER: CPT | Mod: 26,RT,, | Performed by: RADIOLOGY

## 2018-02-21 PROCEDURE — 1125F AMNT PAIN NOTED PAIN PRSNT: CPT | Mod: S$GLB,,, | Performed by: PHYSICIAN ASSISTANT

## 2018-02-21 PROCEDURE — 99999 PR PBB SHADOW E&M-EST. PATIENT-LVL III: CPT | Mod: PBBFAC,,, | Performed by: PHYSICIAN ASSISTANT

## 2018-02-21 RX ORDER — DICLOFENAC SODIUM 75 MG/1
TABLET, DELAYED RELEASE ORAL
Qty: 180 TABLET | Refills: 0 | Status: SHIPPED | OUTPATIENT
Start: 2018-02-21 | End: 2018-07-13 | Stop reason: SDUPTHER
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
## 2018-02-21 NOTE — TELEPHONE ENCOUNTER
I put in a request for neuropsychological testing on February 16      it still says pending review but the wife is calling regarding the status or is there a number that she can call, I suppose in the psychiatry department, so she can set this up         Documentation      Patient schedule with Psychiatry for neuropsychological testing...will discuss appt date and time w/patients wife.

## 2018-02-21 NOTE — TELEPHONE ENCOUNTER
Christina    I put in a request for neuropsychological testing on February 16      it still says pending review but the wife is calling regarding the status or is there a number that she can call, I suppose in the psychiatry department, so she can set this up

## 2018-03-07 ENCOUNTER — CLINICAL SUPPORT (OUTPATIENT)
Dept: REHABILITATION | Facility: HOSPITAL | Age: 73
End: 2018-03-07
Payer: MEDICARE

## 2018-03-07 DIAGNOSIS — G89.29 CHRONIC RIGHT SHOULDER PAIN: ICD-10-CM

## 2018-03-07 DIAGNOSIS — M25.511 CHRONIC RIGHT SHOULDER PAIN: ICD-10-CM

## 2018-03-07 PROCEDURE — 97161 PT EVAL LOW COMPLEX 20 MIN: CPT | Performed by: PHYSICAL THERAPIST

## 2018-03-07 PROCEDURE — G8978 MOBILITY CURRENT STATUS: HCPCS | Mod: CJ | Performed by: PHYSICAL THERAPIST

## 2018-03-07 PROCEDURE — G8979 MOBILITY GOAL STATUS: HCPCS | Mod: CJ | Performed by: PHYSICAL THERAPIST
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
## 2018-03-13 ENCOUNTER — OFFICE VISIT (OUTPATIENT)
Dept: SLEEP MEDICINE | Facility: CLINIC | Age: 73
End: 2018-03-13
Payer: MEDICARE

## 2018-03-13 VITALS
BODY MASS INDEX: 42.9 KG/M2 | WEIGHT: 306.44 LBS | HEART RATE: 98 BPM | SYSTOLIC BLOOD PRESSURE: 130 MMHG | OXYGEN SATURATION: 98 % | HEIGHT: 71 IN | DIASTOLIC BLOOD PRESSURE: 70 MMHG

## 2018-03-13 DIAGNOSIS — G47.31 COMPLEX SLEEP APNEA SYNDROME: Primary | ICD-10-CM

## 2018-03-13 PROCEDURE — 99499 UNLISTED E&M SERVICE: CPT | Mod: S$GLB,,, | Performed by: PSYCHIATRY & NEUROLOGY

## 2018-03-13 PROCEDURE — 99214 OFFICE O/P EST MOD 30 MIN: CPT | Mod: S$GLB,,, | Performed by: PSYCHIATRY & NEUROLOGY

## 2018-03-13 PROCEDURE — 99999 PR PBB SHADOW E&M-EST. PATIENT-LVL III: CPT | Mod: PBBFAC,,, | Performed by: PSYCHIATRY & NEUROLOGY

## 2018-03-13 NOTE — PROGRESS NOTES
"This 72 y.o. male s/p UPPP returns for the management of complex sleep apnea.     Patient reports marked improvement of his sleep with ASV BiPAP. Better sleep maintenance.   He feels like he needs more air.  He is using ASV BIPAP machine on a nightly basis. His PS is set for 5, but I have requested 10 cm in the past    Patient reports prior resolution of the air hunger with increasing PS to min 10. "I'm out as soon as I put it on".    No problems using the ASV. He did get a replaced machine, which has the same settings.  He moved his data card from older to new machine.  He is using a nasal (True Blue) MW/L mask. He attempted a full mask, but felt he couldn't breathe right, so he went back to his nasal mask.   He does use a chin strap "all time". Red and black band "full head gear".    No longer fatigued during the day. He recently stopped swimming, taking a break.  Weight loss of 3 lbs since last visit.    His most recent complaint was 3 am awakenings, but this seemed to have resolved spontaneously.  He tried temazepam, sonata, and Trazodone 50 mg, but has discontinued.    EPWORTH SLEEPINESS SCALE 3/13/2018   Sitting and reading 0   Watching TV 3   Sitting, inactive in a public place (e.g. a theatre or a meeting) 0   As a passenger in a car for an hour without a break 0   Lying down to rest in the afternoon when circumstances permit 0   Sitting and talking to someone 0   Sitting quietly after a lunch without alcohol 0   In a car, while stopped for a few minutes in traffic 0   Total score 3       ASV interrogation: 7,756 hours; machine appears to be cycling appropriately. ASV settings; Max 25, PS max 15 PS min 5; EPAP min 5 EPAP max 10    DIAGNOSTIC PSG: Most recent overnight PSG demonstrated complex sleep apnea. He had an AHI in severe range (AHI >100). With application of PAP, central apneas became apparent, which did not clear with up-titration. Sleep disordered breathing was finally relieved with application of " biPAP 19/10 cm with back up rate 8.  Patient did not tolerate biPAP with back up rate.     ASV TITRATION 9/17/13: Effective improvement of complex sleep apnea with ASV at default settings, EPAP min set at 5 cm.    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 He eventually was sent up on auto titrating BiPAP, which he tolerated, and felt better as a result. He later had UPPP, but had residual JUICE by sleep study done in [place] (results not available). He was started back on CPAP after 2003, but he had difficulty tolerating it. He feels that main issues is wrong machine. He would like to be considered for bipap, because he felt he slept much better with this device than CPAP. JUICE symptoms: unable to sleep well, gasping/stop breathing in sleep, and excessive tiredness during the day    BASELINE PSG 1/19/98 ( report) -prior UPPP: +JUICE, , low sat 90%.    SLEEP ROUTINE:  Time to bed: 8-10pm  Sleep onset latency: 30-60 mins  Disruptions or awakenings: 1-2 x  Wakeup time: 4 am   Perceived sleep quality: 2 out of 5    PAST MEDICAL/ FAMILY / SOCIAL HISTORY:  reviewed in EPIC  Past Medical History:   Diagnosis Date    Depression     HEARING LOSS     Hyperlipidemia     Hypertension     JUICE (obstructive sleep apnea)     Personal history of colonic polyps     Restless legs syndrome (RLS)     Sleep apnea        Past Surgical History:   Procedure Laterality Date    KNEE SURGERY      KNEE SURGERY      leg fx  repair       Family History   Problem Relation Age of Onset    Arthritis Mother     Hypertension Mother     Hearing loss Mother     Pneumonia Mother     Parkinsonism Father     Hypertension Brother     Colon cancer Brother     Hypertension Brother     Hypertension Brother        Social History   Substance Use Topics    Smoking status: Current Every Day Smoker     Packs/day: 0.50     Years: 50.00    Smokeless tobacco: Never Used    Alcohol use No  "      REVIEW OF SYSTEMS:    Weight down 3 lbs;   Denies sinus problems; Denies dyspnea;   Mood okay    ALLERGIES: Reviewed in EPIC    CURRENT MEDICATIONS: Reviewed in EPIC      PHYSICAL EXAM:  /70   Pulse 98   Ht 5' 11" (1.803 m)   Wt (!) 139 kg (306 lb 7 oz)   SpO2 98%   BMI 42.74 kg/m²   GENERAL: Obese body habitus, well groomed                                        ASSESSMENT:    1. Complex sleep apnea (CompSA) with emerging central apnea with standard PAP modalities. Well controlled with ASV BIPAP.  He is s/p UPPP with prior symptoms of ongoing gasping for air in sleep, disrupted sleep, and daytime tiredness without PAP therapy.  He has responded remarkably well to ASV BiPAP. He reports resolution of his symptoms, including difficulty with sleep initiation. Difficulty with sleep maintenance improved with 10+ lbs weight loss. He continues to demonstrate adherence. His machine predicts >90% reduction from baseline AHI, which is excellent. Residual AHI <10 on last download. Some air hunger complaint. In the past, he responded from increase of his pressure support minium to 10 cm.    His ECHO 5/25/2017 showed EF is 60%, so he falls within a different patient population than SERVE-HF study population.    2. Morbid obesity BMI >40. He is making progress with an aggressive work out regimen, weight is down another 3 lbs from last visit.    The patient has medical co-morbidities of hypertension, and obesity (BMI >30).    PLAN:    Treatment:  1. Continue  ASV BIPAP at current settings; EPAP min 5 cm; PS to min 10, so that minimum starting pressure is 15/5 cm (to minimize air hunger); Max pressure would be 25/10.  2. Patient wishes to continue the nasal mask with chin strap.  3. Patient was encouraged to continue his weight loss and the benefits were reiterated.     Precautions: The patient was advised to abstain from driving should he feel sleepy or drowsy.    Follow up: 6 months. MD  "

## 2018-03-21 ENCOUNTER — CLINICAL SUPPORT (OUTPATIENT)
Dept: REHABILITATION | Facility: HOSPITAL | Age: 73
End: 2018-03-21
Payer: MEDICARE

## 2018-03-21 PROCEDURE — 97140 MANUAL THERAPY 1/> REGIONS: CPT

## 2018-03-21 PROCEDURE — 97110 THERAPEUTIC EXERCISES: CPT

## 2018-03-21 NOTE — PROGRESS NOTES
Outpatient Physical Therapy Progress Note     Start of treatment: 8:00  End of treatment: 9:10  Total Treatment time: 60        Subjective: Pt is w/o c/o shld pn at this time, reports that he continues to ex in pool in [facility]    Objective: Pt appears NAD    Treatment: Pt was instructed in and performed ther ex x 50 min for UE strengthening, stretching, ROM, flexibility, scap stab  UBE 6 min  pulleys 3 min each flex/ABD  Doorway stretch 3 x 20 sec  Horiz row OTB 30x  B shld ext OTB 30x  ER/IR OTB 30x  prone row 30x  prone ext 30x  prone HAB 30x  Pt reviewed and demonstrated HEP with re-instruction in proper form  Pt received grade II shld jt/GH mobs, PROM to R shld x 10 min  Pt received CP to R shld x 10 min      Assessment: Pt bobbi tx with ther ex, manual tx well, Pt requires S and max verbal, tactile cues to follow instructions and maintain proper form with exs. Pt with good PROM VCs for relaxation. PT diagnosis: R shld impingement with probable triceps tear   Patient can benefit from outpatient physical therapy and a home program  Prognosis is Fair.              Medical necessity is demonstrated by:   Fall risk  Unable to participate in daily activities  Continued inability to participate in vocational pursuits  Pain limits function of effected part for all activities  Unable to participate fully in daily activities  Requires skilled supervision to complete and progress HEP      Progress towards goals: good    New/Revised Goals:    Plan:  Continue with established Plan of Care toward PT goals.

## 2018-03-28 ENCOUNTER — CLINICAL SUPPORT (OUTPATIENT)
Dept: REHABILITATION | Facility: HOSPITAL | Age: 73
End: 2018-03-28
Payer: MEDICARE

## 2018-03-28 DIAGNOSIS — G89.29 CHRONIC RIGHT SHOULDER PAIN: Primary | ICD-10-CM

## 2018-03-28 DIAGNOSIS — M25.511 CHRONIC RIGHT SHOULDER PAIN: Primary | ICD-10-CM

## 2018-03-28 PROCEDURE — 97110 THERAPEUTIC EXERCISES: CPT

## 2018-03-28 NOTE — PROGRESS NOTES
Outpatient Physical Therapy Progress Note   PT diagnosis: R shld impingement with probable triceps tear. P    Subjective: Pt states that he wants to swim. Pt states R shoulder/arm feels good without pain.     Objective: Pt appears NAD    Treatment: Pt was instructed in and performed ther ex x 50 min for UE strengthening, stretching, ROM, flexibility, scap stab  UBE 6 min  pulleys 3 min each flex/ABD  Doorway stretch 3 x 20 sec  Horiz row OTB 30x  B shld ext OTB 30x  ER/IR OTB 30x  prone row 30x  prone ext 30x  prone HAB 30x  Pt reviewed and demonstrated HEP with re-instruction in proper form  Pt received grade II shld jt/GH mobs, PROM to R shld x 10 min  Pt received CP to R shld x 10 min (NP)    Assessment: Pt with good tolerance to PT today, needs constantly verbal cues to proper perform therex. Patient can benefit from outpatient physical therapy and a home program  Prognosis is Fair.     Medical necessity is demonstrated by:   Fall risk  Unable to participate in daily activities  Continued inability to participate in vocational pursuits  Pain limits function of effected part for all activities  Unable to participate fully in daily activities  Requires skilled supervision to complete and progress HEP      Progress towards goals: good    New/Revised Goals: remain appropriate     Plan:  Continue with established Plan of Care toward PT goals. PT/PTA face-to-face:discussed pt's POC and progress towards established PT goals.   Rosanna Zelaya, PTA

## 2018-03-29 ENCOUNTER — OFFICE VISIT (OUTPATIENT)
Dept: INTERNAL MEDICINE | Facility: CLINIC | Age: 73
End: 2018-03-29
Payer: MEDICARE

## 2018-03-29 VITALS
SYSTOLIC BLOOD PRESSURE: 134 MMHG | BODY MASS INDEX: 43.12 KG/M2 | HEART RATE: 84 BPM | WEIGHT: 308 LBS | DIASTOLIC BLOOD PRESSURE: 80 MMHG | HEIGHT: 71 IN

## 2018-03-29 DIAGNOSIS — I87.2 CHRONIC VENOUS INSUFFICIENCY: ICD-10-CM

## 2018-03-29 DIAGNOSIS — T14.8XXA SKIN AVULSION: Primary | ICD-10-CM

## 2018-03-29 PROCEDURE — 99214 OFFICE O/P EST MOD 30 MIN: CPT | Mod: S$GLB,,, | Performed by: INTERNAL MEDICINE

## 2018-03-29 PROCEDURE — 3079F DIAST BP 80-89 MM HG: CPT | Mod: CPTII,S$GLB,, | Performed by: INTERNAL MEDICINE

## 2018-03-29 PROCEDURE — 3075F SYST BP GE 130 - 139MM HG: CPT | Mod: CPTII,S$GLB,, | Performed by: INTERNAL MEDICINE

## 2018-03-29 PROCEDURE — 99999 PR PBB SHADOW E&M-EST. PATIENT-LVL III: CPT | Mod: PBBFAC,,, | Performed by: INTERNAL MEDICINE

## 2018-03-29 RX ORDER — FUROSEMIDE 20 MG/1
20 TABLET ORAL DAILY
Qty: 7 TABLET | Refills: 0 | Status: SHIPPED | OUTPATIENT
Start: 2018-03-29 | End: 2018-04-07 | Stop reason: SDUPTHER

## 2018-03-29 RX ORDER — FUROSEMIDE 20 MG/1
TABLET ORAL
Qty: 90 TABLET | Refills: 0 | OUTPATIENT
Start: 2018-03-29

## 2018-03-29 NOTE — PROGRESS NOTES
REASON FOR VISIT:  This is a 72-year-old male.  Four days ago, he was walking   down on the concrete steps and scraped the medial aspect right above his left   ankle.  He does have some skin avulsion and there has been some swelling around   the ankle.  He is not reporting any pain.  It appears that he might have had   leftover Bactroban ointment and has been applying twice a day.    PAST MEDICAL HISTORY:  Hypertension.  Type 2 diabetes.  Sleep apnea.  Previous cellulitis of the leg.  Chronic venous insufficiency of the legs.    MEDICATIONS:  As per MedCard.    In reference to his recent visits of olecranon bursitis, cellulitis,   epicondylitis, bicipital tendinitis, he is doing well with physical therapy and   the use of diclofenac.    PHYSICAL EXAMINATION:  VITAL SIGNS:  Per EPIC.  Blood pressure 120/70.  EXTREMITIES:  He does have 2+ pedal and pretibial edema bilateral.  There is a   localized area of skin avulsion.  The wound looks clean.  There is no purulent   discharge.  The skin around is not indurated or warm.  There are some clear   vesicles around it.    IMPRESSION:  1.  Skin avulsion.  2.  Chronic venous insufficiency.    PLAN:  He is to apply Medihoney once or twice a day and to help with edema Lasix   20 mg once a day for one week.  If he is seeing no improvement, refer to [facility].          /mingo [id number] review        BETY/SILVIO  dd: 03/29/2018 14:59:43 (CDT)  td: 03/30/2018 13:01:15 (CDT)  Doc ID   #[id number]  Job ID #[id number]    CC:

## 2018-04-03 ENCOUNTER — CLINICAL SUPPORT (OUTPATIENT)
Dept: REHABILITATION | Facility: HOSPITAL | Age: 73
End: 2018-04-03
Payer: MEDICARE

## 2018-04-03 DIAGNOSIS — M25.511 CHRONIC RIGHT SHOULDER PAIN: Primary | ICD-10-CM

## 2018-04-03 DIAGNOSIS — G89.29 CHRONIC RIGHT SHOULDER PAIN: Primary | ICD-10-CM

## 2018-04-03 PROCEDURE — 97110 THERAPEUTIC EXERCISES: CPT

## 2018-04-04 NOTE — PROGRESS NOTES
" Outpatient Physical Therapy Progress Note   PT diagnosis: R shld impingement with probable triceps tear. P    Subjective: Pt states not being able to go to the pool secondary to wound on LE. Pt sates arm "feels good."     Objective: Pt appears NAD    Treatment: Pt was instructed in and performed ther ex x 50 min for UE strengthening, stretching, ROM, flexibility, scap stab  UBE 6 min  pulleys 3 min each flex/ABD  Doorway stretch 3 x 20 sec  Horiz row OTB 30x  B shld ext OTB 30x  ER/IR OTB 30x  prone row 30x (NP)  prone ext 30x (NP)  prone HAB 30x (NP)  Biceps curls 3# 30 reps   Elbow extension 3# 30 reps   Pt reviewed and demonstrated HEP with re-instruction in proper form  Pt received grade II shld jt/GH mobs, PROM to R shld x 10 min  Pt received CP to R shld x 10 min (NP)    Assessment: Pt with good tolerance to PT today, needs constantly verbal cues to proper perform therex. Patient can benefit from outpatient physical therapy and a home program  Prognosis is Fair.     Medical necessity is demonstrated by:   Fall risk  Unable to participate in daily activities  Continued inability to participate in vocational pursuits  Pain limits function of effected part for all activities  Unable to participate fully in daily activities  Requires skilled supervision to complete and progress HEP      Progress towards goals: good    New/Revised Goals: remain appropriate     Plan:  Continue with established Plan of Care toward PT goals.   "

## 2018-04-07 RX ORDER — FUROSEMIDE 40 MG/1
TABLET ORAL
Qty: 90 TABLET | Refills: 0 | OUTPATIENT
Start: 2018-04-07

## 2018-04-07 RX ORDER — FUROSEMIDE 20 MG/1
TABLET ORAL
Qty: 7 TABLET | Refills: 0 | Status: SHIPPED | OUTPATIENT
Start: 2018-04-07 | End: 2018-07-13

## 2018-04-16 ENCOUNTER — TELEPHONE (OUTPATIENT)
Dept: SMOKING CESSATION | Facility: CLINIC | Age: 73
End: 2018-04-16

## 2018-04-25 ENCOUNTER — TELEPHONE (OUTPATIENT)
Dept: SMOKING CESSATION | Facility: CLINIC | Age: 73
End: 2018-04-25

## 2018-04-25 ENCOUNTER — TELEPHONE (OUTPATIENT)
Dept: ORTHOPEDICS | Facility: CLINIC | Age: 73
End: 2018-04-25

## 2018-04-25 NOTE — TELEPHONE ENCOUNTER
----- Message from Dianna Herrera sent at 4/25/2018 11:00 AM CDT -----  Contact: Wife  Wife was retuning a missed phone call.

## 2018-04-25 NOTE — TELEPHONE ENCOUNTER
----- Message from Sugar Walton sent at 4/25/2018 10:17 AM CDT -----  Contact: Pt's wife  Pt's wife is calling to schedule knee injection and can be reached at [phone].    Thank you

## 2018-04-25 NOTE — TELEPHONE ENCOUNTER
2nd attempt not available the time of follow wife will give him  message regarding smoking cessation quit 2 episode.

## 2018-05-01 ENCOUNTER — TELEPHONE (OUTPATIENT)
Dept: SMOKING CESSATION | Facility: CLINIC | Age: 73
End: 2018-05-01

## 2018-05-01 ENCOUNTER — OFFICE VISIT (OUTPATIENT)
Dept: OTOLARYNGOLOGY | Facility: CLINIC | Age: 73
End: 2018-05-01
Payer: MEDICARE

## 2018-05-01 VITALS
SYSTOLIC BLOOD PRESSURE: 122 MMHG | WEIGHT: 311.31 LBS | BODY MASS INDEX: 43.42 KG/M2 | DIASTOLIC BLOOD PRESSURE: 69 MMHG | TEMPERATURE: 97 F | HEART RATE: 76 BPM

## 2018-05-01 DIAGNOSIS — E66.9 OBESITY, UNSPECIFIED CLASSIFICATION, UNSPECIFIED OBESITY TYPE, UNSPECIFIED WHETHER SERIOUS COMORBIDITY PRESENT: ICD-10-CM

## 2018-05-01 DIAGNOSIS — K21.9 LARYNGOPHARYNGEAL REFLUX (LPR): Primary | ICD-10-CM

## 2018-05-01 DIAGNOSIS — G47.30 SLEEP APNEA, UNSPECIFIED TYPE: ICD-10-CM

## 2018-05-01 DIAGNOSIS — R13.10 DYSPHAGIA, UNSPECIFIED TYPE: ICD-10-CM

## 2018-05-01 PROCEDURE — 3078F DIAST BP <80 MM HG: CPT | Mod: CPTII,S$GLB,, | Performed by: OTOLARYNGOLOGY

## 2018-05-01 PROCEDURE — 3074F SYST BP LT 130 MM HG: CPT | Mod: CPTII,S$GLB,, | Performed by: OTOLARYNGOLOGY

## 2018-05-01 PROCEDURE — 99999 PR PBB SHADOW E&M-EST. PATIENT-LVL III: CPT | Mod: PBBFAC,,, | Performed by: OTOLARYNGOLOGY

## 2018-05-01 PROCEDURE — 99214 OFFICE O/P EST MOD 30 MIN: CPT | Mod: 25,S$GLB,, | Performed by: OTOLARYNGOLOGY

## 2018-05-01 PROCEDURE — 31575 DIAGNOSTIC LARYNGOSCOPY: CPT | Mod: S$GLB,,, | Performed by: OTOLARYNGOLOGY

## 2018-05-01 RX ORDER — PANTOPRAZOLE SODIUM 40 MG/1
40 TABLET, DELAYED RELEASE ORAL DAILY
Qty: 30 TABLET | Refills: 11 | Status: SHIPPED | OUTPATIENT
Start: 2018-05-01 | End: 2018-07-13 | Stop reason: CLARIF

## 2018-05-01 NOTE — PROGRESS NOTES
Head and Neck Surgery Clinic Note    CC: many    HPI: Jake Louie is a 72 y.o. male presenting with a history of severe JUICE treated with UPPP/tonsillectomy by Dr. Hodge, currently on CPAP with humidifier with good results. Over the past 2 months he has noted nonproductive cough and aspiration symptoms, particularly for liquids. No dysphagia for solids. No regurgitation of undigested foods. No voice changes, throat clearing or heartburn. He also has known hearing loss and wears hearing aids, which he did not bring today. He ahs not treated his swallowing problem with anything, and it does not seem progressive. He does have a family history of Parkinson's, and he denies smell changes.    Past Medical History:   Diagnosis Date    Depression     HEARING LOSS     Hyperlipidemia     Hypertension     JUICE (obstructive sleep apnea)     Personal history of colonic polyps     Restless legs syndrome (RLS)     Sleep apnea        Past Surgical History:   Procedure Laterality Date    KNEE SURGERY      KNEE SURGERY      leg fx  repair         Current Outpatient Prescriptions:     ascorbic acid (VITAMIN C) 500 MG tablet, Take 500 mg by mouth once daily., Disp: , Rfl:     diclofenac (VOLTAREN) 75 MG EC tablet, TAKE 1 TABLET(75 MG) BY MOUTH TWICE DAILY, Disp: 180 tablet, Rfl: 0    furosemide (LASIX) 20 MG tablet, TAKE 1 TABLET(20 MG) BY MOUTH EVERY DAY, Disp: 7 tablet, Rfl: 0    lisinopril-hydrochlorothiazide (PRINZIDE,ZESTORETIC) 20-25 mg Tab, Take 1 tablet by mouth once daily., Disp: 90 tablet, Rfl: 3    metformin (GLUCOPHAGE) 500 MG tablet, Take 1 tablet (500 mg total) by mouth 2 (two) times daily with meals., Disp: 180 tablet, Rfl: 3    multivitamin (THERAGRAN) per tablet, Take 1 tablet by mouth once daily.  , Disp: , Rfl:     mupirocin (BACTROBAN) 2 % ointment, AAA bid, Disp: 30 g, Rfl: 2    mupirocin (BACTROBAN) 2 % ointment, APPLY TOPICALLY TWO TIMES A DAY., Disp: 44 g, Rfl: 0    pravastatin  (PRAVACHOL) 20 MG tablet, Take 1 tablet (20 mg total) by mouth once daily., Disp: 90 tablet, Rfl: 3    vitamin D 1000 units Tab, Take 185 mg by mouth once daily., Disp: , Rfl:     Review of patient's allergies indicates:  No Known Allergies    Family History   Problem Relation Age of Onset    Arthritis Mother     Hypertension Mother     Hearing loss Mother     Pneumonia Mother     Parkinsonism Father     Hypertension Brother     Colon cancer Brother     Hypertension Brother     Hypertension Brother        Social History     Social History    Marital status:      Spouse name: N/A    Number of children: N/A    Years of education: N/A     Occupational History     Other     Social History Main Topics    Smoking status: Current Every Day Smoker     Packs/day: 0.50     Years: 50.00    Smokeless tobacco: Never Used    Alcohol use No    Drug use: No    Sexual activity: Not on file     Other Topics Concern    Not on file     Social History Narrative    No narrative on file       Review of Systems -  Constitutional: Denies having night sweats, constant fatigue, loss of appetite or recent substantial weight loss.  Eyes: Denies blurred vision or double vision.  Respiratory: Denies symptoms of shortness of breath, noisy breathing, hoarseness or chronic cough.  GI: Denies symptoms of heartburn, acid regurgitation, or the known presence of a hiatal hernia.  The remainder of a 10-point review of systems is negative    PERSONAL REVIEW OF RADIOLOGICAL FILMS AND RECORDS:  Noncontributory    PHYSICAL EXAM:  Vitals - /69   Pulse 76   Temp 97.4 °F (36.3 °C)   Wt (!) 141.2 kg (311 lb 4.6 oz)   BMI 43.42 kg/m²   Constitutional -      General Appearance: well developed, well nourished, without obvious deformities     Communication: speaks with a normal voice without hoarseness  Head & Face -     Overall: no obvious scars, lesions or masses     Parotid and submandibular glands: no masses or tenderness      Facial strength: normal and equal bilaterally  Eyes -      EOM intact  Ear, Nose, Mouth & Throat -     Ears: both left and right external auditory canals and TM's are normal, no external deformities     Nasal exam: mucosa is pink, septum is midline, visible turbinates are normal on anterior rhinoscopy     Mastication: teeth appear present     Oral Cavity and oropharynx: mucosa, hard and soft palates, tongue, posterior pharyngeal wall, lips and gums are without lesions. Tonsils appear surgically absent, S/P UPPP. No tongue fasciculations.  Respiratory:     Breathing unlabored  Larynx: using the mirror for indirect laryngoscopy, the epiglottic, false cords, true cords, and pyriform sinuses are without lesions and the true vocal cords move normally     Neck: appears symmetric, and on palpation is without masses or lymphadenopathy     Thyroid: no asymmetry, thyromegaly, or thyroid nodules on palpation  Cranial Nerves:      II: Pupillary reflexes normal     III, IV, VI: EOM normal     V: 1,2,3: normal sensation     VII: Normal strength in all divisions     IX, X: Normal voice, palatal elevation and sensation     XI: Shoulder strength normal       XII: Tongue mobility normal  Psychiatric:     Appropriate affect    FLEXIBLE LARYNGOSCOPY     Indications:  dysphagia     Procedure:  With the patient sitting upright, informed consent was obtained.  Topical anesthesia and vasoconstriction was applied to both nares.  After waiting an appropriate period of time for anesthesia/vasoconstriction to become effective, a flexible laryngoscope was passed through the R side(s) of the nose and the nose, nasopharynx, oropharynx, hypopharynx and larynx were examined.  The patient tolerated the procedure without complications.     Findings: The nasal passageways and nasopharynx appear normal without edema or erythema.  The oropharynx, base of tongue, piriform sinuses, epiglottis, and aryepiglottic folds were examined and no masses, lesions,  or ulcerations were seen.  The true vocal cords move well to midline bilaterally.  The airway is widely patent.  There is significant laryngeal erythema    ASSESSMENT: LPR, dysphagia    PLAN: Will begin empiric treatment for presumed LPR, however his dysphagia for liquids/aspiration history is certainly concerning. I will order MBSS and refer him for formal speech and swallowing evaluation by my partner, Dr. Chaudhary. This is concerning for some neuromuscular process.       Lawrence Evangelista

## 2018-05-01 NOTE — TELEPHONE ENCOUNTER
3rd attempt left message regarding smoking cessation quit 1 & 2 episode will resolve episode..

## 2018-05-02 ENCOUNTER — OFFICE VISIT (OUTPATIENT)
Dept: ORTHOPEDICS | Facility: CLINIC | Age: 73
End: 2018-05-02
Payer: MEDICARE

## 2018-05-02 ENCOUNTER — HOSPITAL ENCOUNTER (OUTPATIENT)
Dept: RADIOLOGY | Facility: HOSPITAL | Age: 73
Discharge: HOME | End: 2018-05-02
Attending: PHYSICIAN ASSISTANT
Payer: MEDICARE

## 2018-05-02 VITALS — WEIGHT: 311.06 LBS | BODY MASS INDEX: 43.39 KG/M2

## 2018-05-02 DIAGNOSIS — M25.562 PAIN IN BOTH KNEES, UNSPECIFIED CHRONICITY: ICD-10-CM

## 2018-05-02 DIAGNOSIS — M25.561 PAIN IN BOTH KNEES, UNSPECIFIED CHRONICITY: ICD-10-CM

## 2018-05-02 DIAGNOSIS — M17.0 PRIMARY OSTEOARTHRITIS OF BOTH KNEES: Primary | ICD-10-CM

## 2018-05-02 PROCEDURE — 73562 X-RAY EXAM OF KNEE 3: CPT | Mod: TC,50

## 2018-05-02 PROCEDURE — 20610 DRAIN/INJ JOINT/BURSA W/O US: CPT | Mod: 50,S$GLB,, | Performed by: PHYSICIAN ASSISTANT

## 2018-05-02 PROCEDURE — 99213 OFFICE O/P EST LOW 20 MIN: CPT | Mod: 25,S$GLB,, | Performed by: PHYSICIAN ASSISTANT

## 2018-05-02 PROCEDURE — 73562 X-RAY EXAM OF KNEE 3: CPT | Mod: 26,50,, | Performed by: RADIOLOGY

## 2018-05-02 PROCEDURE — 99999 PR PBB SHADOW E&M-EST. PATIENT-LVL III: CPT | Mod: PBBFAC,,, | Performed by: PHYSICIAN ASSISTANT

## 2018-05-02 RX ORDER — METHYLPREDNISOLONE ACETATE 80 MG/ML
80 INJECTION, SUSPENSION INTRA-ARTICULAR; INTRALESIONAL; INTRAMUSCULAR; SOFT TISSUE
Status: COMPLETED | OUTPATIENT
Start: 2018-05-02 | End: 2018-05-02

## 2018-05-02 RX ADMIN — METHYLPREDNISOLONE ACETATE 80 MG: 80 INJECTION, SUSPENSION INTRA-ARTICULAR; INTRALESIONAL; INTRAMUSCULAR; SOFT TISSUE at 12:05

## 2018-05-02 NOTE — PROGRESS NOTES
Subjective:      Patient ID: Jake Louie is a 72 y.o. male.    Chief Complaint: No chief complaint on file.    HPI  Patient returns with chief complaint of bilateral knee pain. He has a known h/o OA. He received euflexxa injections in the past without much relief. Cortisone provides better relief for him. Last injections were in July 2017. He is taking diclofenac and says it helps. He also swims which helps his knees. He says the left knee has been wanting to give way recently. He does not use assistive devices.   Review of Systems   Constitution: Negative for chills, fever and night sweats.   Cardiovascular: Negative for chest pain.   Respiratory: Negative for cough and shortness of breath.    Hematologic/Lymphatic: Does not bruise/bleed easily.   Skin: Negative for color change.   Gastrointestinal: Negative for heartburn.   Genitourinary: Negative for dysuria.   Neurological: Negative for numbness and paresthesias.   Psychiatric/Behavioral: Negative for altered mental status.   Allergic/Immunologic: Negative for persistent infections.         Objective:            General    Vitals reviewed.  Constitutional: He is oriented to person, place, and time. He appears well-developed and well-nourished.   Cardiovascular: Normal rate.    Neurological: He is alert and oriented to person, place, and time.             Bilateral Knee Exam:  ROM 0-120 degrees  No effusion  No warmth or erythema  No TTP      X-ray: ordered and reviewed by myself. DJD both knees.       Assessment:       Encounter Diagnosis   Name Primary?    Primary osteoarthritis of both knees Yes          Plan:       Discussed treatment options with patient. He is not interested in surgery. He would like to repeat cortisone injections. HEP given. RTC prn.     PROCEDURE:  I have explained the risks, benefits, and alternatives of the procedure in detail.  The patient voices understanding and all questions have been answered.  The patient agrees to proceed  as planned. So after I performed a sterile prep of the skin in the normal fashion the bilateral knee is injected using a 22 gauge needle from the anteromedial approach with a combination of 4cc 1% plain lidocaine and 80 mg of depo medrol.  The patient is cautioned and immediate relief of pain is secondary to the local anesthetic and will be temporary.  After the anesthetic wears off there may be a increase in pain that may last for a few hours or a few days and they should use ice to help alleviate this flair up of pain.

## 2018-05-03 ENCOUNTER — TELEPHONE (OUTPATIENT)
Dept: SPEECH THERAPY | Facility: HOSPITAL | Age: 73
End: 2018-05-03

## 2018-05-03 DIAGNOSIS — R13.10 DYSPHAGIA, UNSPECIFIED TYPE: Primary | ICD-10-CM

## 2018-05-04 ENCOUNTER — TELEPHONE (OUTPATIENT)
Dept: OTOLARYNGOLOGY | Facility: CLINIC | Age: 73
End: 2018-05-04

## 2018-05-04 NOTE — TELEPHONE ENCOUNTER
----- Message from Karen Ac MA sent at 5/3/2018  3:07 PM CDT -----  Contact: patient wife  Talk to patient wife she just need to know if her   still need to keep appt.   ----- Message -----  From: Madelin Steiner  Sent: 5/3/2018   1:25 PM  To: [facility] Staff    Please call above patient wife has a question for the nurse said the next swallow study is may 24 should they keep appointment with  or wait until procedure is done need to speak with the nurse about this waiting on a call back

## 2018-05-08 ENCOUNTER — PES CALL (OUTPATIENT)
Dept: ADMINISTRATIVE | Facility: CLINIC | Age: 73
End: 2018-05-08

## 2018-05-09 ENCOUNTER — PATIENT MESSAGE (OUTPATIENT)
Dept: INTERNAL MEDICINE | Facility: CLINIC | Age: 73
End: 2018-05-09

## 2018-05-10 NOTE — TELEPHONE ENCOUNTER
, according to [facility] lab results, Jake has not had a follow-up A1C performed since 7/18/17. Can you order one and any other lab you think he may need?  I am not sure when he had his last physical and if he needs to have one scheduled. Thanks for your assistance, Sari

## 2018-05-23 ENCOUNTER — LAB VISIT (OUTPATIENT)
Dept: LAB | Facility: HOSPITAL | Age: 73
End: 2018-05-23
Attending: INTERNAL MEDICINE
Payer: MEDICARE

## 2018-05-23 ENCOUNTER — TELEPHONE (OUTPATIENT)
Dept: RADIOLOGY | Facility: HOSPITAL | Age: 73
End: 2018-05-23

## 2018-05-23 DIAGNOSIS — E11.9 TYPE 2 DIABETES MELLITUS WITHOUT COMPLICATION: ICD-10-CM

## 2018-05-23 LAB
ESTIMATED AVG GLUCOSE: 120 MG/DL
HBA1C MFR BLD HPLC: 5.8 %

## 2018-05-23 PROCEDURE — 36415 COLL VENOUS BLD VENIPUNCTURE: CPT | Mod: PO

## 2018-05-23 PROCEDURE — 83036 HEMOGLOBIN GLYCOSYLATED A1C: CPT

## 2018-05-24 ENCOUNTER — CLINICAL SUPPORT (OUTPATIENT)
Dept: SPEECH THERAPY | Facility: HOSPITAL | Age: 73
End: 2018-05-24
Payer: MEDICARE

## 2018-05-24 ENCOUNTER — HOSPITAL ENCOUNTER (OUTPATIENT)
Dept: RADIOLOGY | Facility: HOSPITAL | Age: 73
Discharge: HOME | End: 2018-05-24
Attending: OTOLARYNGOLOGY
Payer: MEDICARE

## 2018-05-24 DIAGNOSIS — R13.10 DYSPHAGIA, UNSPECIFIED TYPE: ICD-10-CM

## 2018-05-24 DIAGNOSIS — E11.9 TYPE 2 DIABETES MELLITUS WITHOUT COMPLICATION: ICD-10-CM

## 2018-05-24 DIAGNOSIS — R05.9 COUGH: Primary | ICD-10-CM

## 2018-05-24 PROCEDURE — G8997 SWALLOW GOAL STATUS: HCPCS | Mod: GN,CI | Performed by: SPEECH-LANGUAGE PATHOLOGIST

## 2018-05-24 PROCEDURE — 92611 MOTION FLUOROSCOPY/SWALLOW: CPT | Mod: GN | Performed by: SPEECH-LANGUAGE PATHOLOGIST

## 2018-05-24 PROCEDURE — 74230 X-RAY XM SWLNG FUNCJ C+: CPT | Mod: 26,,, | Performed by: RADIOLOGY

## 2018-05-24 PROCEDURE — G8996 SWALLOW CURRENT STATUS: HCPCS | Mod: GN,CI | Performed by: SPEECH-LANGUAGE PATHOLOGIST

## 2018-05-24 PROCEDURE — 74230 X-RAY XM SWLNG FUNCJ C+: CPT | Mod: TC

## 2018-05-24 PROCEDURE — G8998 SWALLOW D/C STATUS: HCPCS | Mod: GN,CI | Performed by: SPEECH-LANGUAGE PATHOLOGIST

## 2018-05-24 NOTE — PROGRESS NOTES
Referring provider: Dr. Lawrence Evangelista  Reason for visit:  Modified barium swallow study (CPT 63145)    Report Summary   --Date: 05/24/2018  --Purpose: to evaluate anatomy and physiology of the oropharyngeal swallow, to determine effectiveness of rehabilitation strategies, and to determine diet consistency and intervention recommendations.   --Diet recommendations: regular as tolerated    Subjective / History    Jake Louie is a 72 y.o. male referred by Dr. Evangelista for Modified Barium Swallow Study (96126).  He is currently on a regular diet.  He denies any significant issues with eating/drinking.  His wife reports he coughs while laying down.  She reports it wakes him up at night, but that he also has JUICE.  She also reports that he clears his throat and sometimes coughs as if something has gone down the wrong pipe while he is drinking things like coffee or water.  His wife also reports he has had a mild cognitive decline.  Dr. Evangelista has concerns for an underlying neuromuscular process.  He does not have a neurologist.  He denies recent aspiration pna or weight loss.  He has not changed his diet.      Past Medical History:   Diagnosis Date    Depression     HEARING LOSS     Hyperlipidemia     Hypertension     JUICE (obstructive sleep apnea)     Personal history of colonic polyps     Restless legs syndrome (RLS)     Sleep apnea      Current Outpatient Prescriptions on File Prior to Visit   Medication Sig Dispense Refill    ascorbic acid (VITAMIN C) 500 MG tablet Take 500 mg by mouth once daily.      diclofenac (VOLTAREN) 75 MG EC tablet TAKE 1 TABLET(75 MG) BY MOUTH TWICE DAILY 180 tablet 0    furosemide (LASIX) 20 MG tablet TAKE 1 TABLET(20 MG) BY MOUTH EVERY DAY 7 tablet 0    lisinopril-hydrochlorothiazide (PRINZIDE,ZESTORETIC) 20-25 mg Tab Take 1 tablet by mouth once daily. 90 tablet 3    metformin (GLUCOPHAGE) 500 MG tablet Take 1 tablet (500 mg total) by mouth 2 (two) times daily with meals.  180 tablet 3    multivitamin (THERAGRAN) per tablet Take 1 tablet by mouth once daily.        mupirocin (BACTROBAN) 2 % ointment AAA bid 30 g 2    mupirocin (BACTROBAN) 2 % ointment APPLY TOPICALLY TWO TIMES A DAY. 44 g 0    pantoprazole (PROTONIX) 40 MG tablet Take 1 tablet (40 mg total) by mouth once daily. 30 tablet 11    pravastatin (PRAVACHOL) 20 MG tablet Take 1 tablet (20 mg total) by mouth once daily. 90 tablet 3    vitamin D 1000 units Tab Take 185 mg by mouth once daily.       Current Facility-Administered Medications on File Prior to Visit   Medication Dose Route Frequency Provider Last Rate Last Dose    barium sulfare 700 mg tablet Tab 700 mg  1 tablet Oral ONCE PRN MD Grisel Hensley pudding Ba Sulfate 10 mL  10 mL Oral ONCE PRN MD Grisel Hensley thin liquid ba sulfate 100 mL  100 mL Oral ONCE PRN Lawrence Evangelista MD           Objective   Lateral/oblique videofluorographic views were obtained. The radiologist and speech pathologist were present for the entire procedure.     Consistencies assessed / method of administration  thin liquid/cup, thin liquid/sequential cup sips, pudding/tsp, cracker and barium tablet.  Also used straw for liquids as pt reports he typically drinks from a straw.      Oral phase  - Grossly WNL with adequate/timely lip closure, tongue control during bolus hold, bolus preparation, and bolus transport/lingual motion.  Little to no oral residue.      Pharyngeal phase  - Mildly delayed initiation - valleculae  - Adequate soft palate elevation  - Complete laryngeal elevation and anterior hyoid excursion  - Complete epiglottic movement  - Mildly reduced laryngeal vestibular closure: flash penetration noted on 1-2 trials of thin liquid, on large continuous sips only  - Adequate pharyngeal stripping wave  - Adequate PE segment opening  - Complete tongue base retraction  - Mild pharyngeal residue - valleculae, cleared with repeat swallow.      Strategies/therapeutic interventions attempted  Small bites/sips; multiple swallows per bolus.  Strategies were effective in decreasing/eliminating risk for aspiration & pharyngeal residue.     Rosenbek Penetration-Aspiration Scale (Rosenbek et al 1996)  Best Trial: 1. Contrast does not enter airway.   Worst Trial: 2. Contrast enters airway but remains above TVF, no residue.     Assessment / Impressions   The results of this MBSS indicate that patient demonstrates mildly impaired swallowing function, but overall WNL, c/b intermittent silent penetration of thin liquid consistencies during large swallows only.    Recommendations / POC   -Diet: recommend regular as tolerated   -Therapeutic technique: recommend small bites/sips, alternate solid with liquid wash and multiple swallows per bolus.  Discussed recs with pt and his wife, with emphasis on reducing bolus size if drinking from the side of the cup or through a straw.  Encouraged alternating bites/sips to limit gulping of liquids.    -Contact clinician or referring provider for repeat MBSS if swallowing status changes or worsens    G-Codes for Swallowing  Current status:  - CI  Projected status:   - CI  Discharge status:   -  CI

## 2018-05-24 NOTE — PROGRESS NOTES
The hemoglobin A1c reading looks better from the last 2 times    Continue with the metformin and attention to diet, physical activity, weight management

## 2018-05-28 ENCOUNTER — OFFICE VISIT (OUTPATIENT)
Dept: OTOLARYNGOLOGY | Facility: CLINIC | Age: 73
End: 2018-05-28
Payer: MEDICARE

## 2018-05-28 VITALS
TEMPERATURE: 98 F | HEART RATE: 67 BPM | DIASTOLIC BLOOD PRESSURE: 70 MMHG | BODY MASS INDEX: 42.77 KG/M2 | SYSTOLIC BLOOD PRESSURE: 144 MMHG | WEIGHT: 306.69 LBS

## 2018-05-28 DIAGNOSIS — R13.14 DYSPHAGIA, PHARYNGOESOPHAGEAL: Primary | ICD-10-CM

## 2018-05-28 DIAGNOSIS — R09.89 THROAT CLEARING: ICD-10-CM

## 2018-05-28 PROCEDURE — 3077F SYST BP >= 140 MM HG: CPT | Mod: CPTII,S$GLB,, | Performed by: OTOLARYNGOLOGY

## 2018-05-28 PROCEDURE — 3078F DIAST BP <80 MM HG: CPT | Mod: CPTII,S$GLB,, | Performed by: OTOLARYNGOLOGY

## 2018-05-28 PROCEDURE — 99213 OFFICE O/P EST LOW 20 MIN: CPT | Mod: S$GLB,,, | Performed by: OTOLARYNGOLOGY

## 2018-05-28 PROCEDURE — 99999 PR PBB SHADOW E&M-EST. PATIENT-LVL III: CPT | Mod: PBBFAC,,, | Performed by: OTOLARYNGOLOGY

## 2018-05-28 NOTE — PROGRESS NOTES
[facility]  Department of Otorhinolaryngology and Communication Sciences    Jake Louie is a 72 y.o. male who presents to the [facility]  for consultation at the kind request of Dr. Evangelista for further management of aerodigestive symptoms.     He had recently met with Dr. Evangelista regarding multiple aerodigetsive symptoms including liquid dysphagia, cough, throat clearing. Onset was gradual. Duration is several years. Time course is intermittent. Symptoms are improving. Exacerbating factors include the post-prandial state. Alleviating factors include potential mild benefit on daily acid suppression. Since taking daily acid suppression, he is no longer waking up during the middle of the night.   He denies any associated symptoms. In fact, the patient assures me that the symptoms discussed at his last visit with Dr. Evangelista have resolved. He is only here because the appointment was scheduled.    He is s/p upper airway surgery by Dr. Hodge for JUICE man years ago.  He has some cognitive issues, for which a neuro/psych appt is pending.    He underwent MBSS 5/24/2018:  mildly impaired swallowing function, but overall WNL, c/b intermittent silent penetration of thin liquid consistencies during large swallows only    Voice Handicap Index-10 (VHI-10) score is 0.   Reflux Symptom Index (RSI) score is 8.   Eating Assessment Tool-10 (EAT-10) score is 0.   Dyspnea Index (DI) score is 1.  Cough Severity Index (CSI) score is 1.    Past Medical History  He has a past medical history of Depression; HEARING LOSS; Hyperlipidemia; Hypertension; JUICE (obstructive sleep apnea); Personal history of colonic polyps; Restless legs syndrome (RLS); and Sleep apnea.    Past Surgical History  He has a past surgical history that includes leg fx (repair); Knee surgery; and Knee surgery.    Family History  His family history includes Arthritis in his mother; Colon cancer in his brother; Hearing loss in his mother; Hypertension in  his brother, brother, brother, and mother; Parkinsonism in his father; Pneumonia in his mother.    Social History  He reports that he has been smoking.  He has a 25.00 pack-year smoking history. He has never used smokeless tobacco. He reports that he does not drink alcohol or use drugs.    Allergies  He has No Known Allergies.    Medications  He has a current medication list which includes the following prescription(s): ascorbic acid (vitamin c), diclofenac, furosemide, lisinopril-hydrochlorothiazide, metformin, multivitamin, mupirocin, mupirocin, pantoprazole, pravastatin, and vitamin d.    Review of Systems   Constitutional: Negative for fever.   HENT: Negative for sore throat.    Eyes: Negative for visual disturbance.   Respiratory: Positive for wheezing.    Cardiovascular: Negative for chest pain.   Gastrointestinal: Positive for nausea.   Musculoskeletal: Positive for arthralgias.   Skin: Negative for rash.   Neurological: Negative for tremors.   Hematological: Does not bruise/bleed easily.   Psychiatric/Behavioral: The patient is not nervous/anxious.           Objective:     BP (!) 144/70   Pulse 67   Temp 97.7 °F (36.5 °C)   Wt (!) 139.1 kg (306 lb 10.6 oz)   BMI 42.77 kg/m²       Physical Exam    Constitutional: comfortable, well dressed  Psychiatric: appropriate affect  Respiratory: comfortably breathing, symmetric chest rise, no stridor  Voice: minimal, variable roughness  Cardiovascular: upper extremities non-edematous  Lymphatic: no cervical lymphadenopathy  Neurologic: alert and oriented to time, place, person, and situation; cranial nerves 3-12 grossly intact  Head: normocephalic  Eyes: conjunctivae and sclerae clear  Ears: normal pinnae, normal external auditory canals, tympanic membranes intact  Nose: mucosa pink and noncongested, no masses, no mucopurulence, no polyps  Oral cavity / oropharynx: no mucosal lesions  Neck: soft, full range of motion, laryngotracheal complex palpable with  appropriate landmarks, larynx elevates on swallowing  Indirect laryngoscopy: limited due to gag    Data Reviewed:  See HPI        Assessment:     Jake Louie is a 72 y.o. male with a recent history of aerodigestive symptoms which have resolved. His recent MBSS was primarily reassuring.       Plan:        I had a discussion with the patient and his wife regarding his condition and the further workup and management options.      I recommended adherence to the swallowing strategies recommended by his SLP clinician.    He will follow up with me in the future on an as-needed basis.    All questions were answered, and the patient is in agreement with the above.     Zachary Chaudhary M.D.  [facility]  Department of Otorhinolaryngology and Communication Sciences

## 2018-06-12 ENCOUNTER — TELEPHONE (OUTPATIENT)
Dept: SLEEP MEDICINE | Facility: CLINIC | Age: 73
End: 2018-06-12

## 2018-06-12 NOTE — TELEPHONE ENCOUNTER
Left message for the pt's wife to return phone call in regards to patient's cpap supplies and prescription refill.       Please advise.

## 2018-06-12 NOTE — TELEPHONE ENCOUNTER
----- Message from Padmini Britt sent at 6/12/2018  1:42 PM CDT -----  Contact: pt's wife            Name of Who is Calling: REX REID [[id number]]      What is the request in detail:pt needs new cpap supplies with a prescription refill.. Please advise      Can the clinic reply by [facility]: no    What Number to Call Back if not in [facility]: [phone]

## 2018-06-13 ENCOUNTER — TELEPHONE (OUTPATIENT)
Dept: SLEEP MEDICINE | Facility: CLINIC | Age: 73
End: 2018-06-13

## 2018-06-13 DIAGNOSIS — G47.31 COMPLEX SLEEP APNEA SYNDROME: Primary | ICD-10-CM

## 2018-06-13 NOTE — TELEPHONE ENCOUNTER
----- Message from Nidiataniaalicia Polanco sent at 2018  9:56 AM CDT -----  Regarding: Cpap Supplies  Contact: [phone]  Patient is requesting new cpap supplies (nasal mask) and Rx has . Please put one in so that I can process it. Thanks!

## 2018-06-22 DIAGNOSIS — Z12.11 SPECIAL SCREENING FOR MALIGNANT NEOPLASMS, COLON: Primary | ICD-10-CM

## 2018-06-22 DIAGNOSIS — Z86.010 ENCOUNTER FOR COLONOSCOPY DUE TO HISTORY OF COLONIC POLYP: Primary | ICD-10-CM

## 2018-06-22 DIAGNOSIS — Z12.11 ENCOUNTER FOR COLONOSCOPY DUE TO HISTORY OF COLONIC POLYP: Primary | ICD-10-CM

## 2018-06-22 RX ORDER — POLYETHYLENE GLYCOL 3350, SODIUM SULFATE ANHYDROUS, SODIUM BICARBONATE, SODIUM CHLORIDE, POTASSIUM CHLORIDE 236; 22.74; 6.74; 5.86; 2.97 G/4L; G/4L; G/4L; G/4L; G/4L
4 POWDER, FOR SOLUTION ORAL ONCE
Qty: 4000 ML | Refills: 0 | Status: SHIPPED | OUTPATIENT
Start: 2018-06-22 | End: 2018-06-22

## 2018-06-27 ENCOUNTER — OFFICE VISIT (OUTPATIENT)
Dept: PSYCHIATRY | Facility: CLINIC | Age: 73
End: 2018-06-27
Payer: MEDICARE

## 2018-06-27 DIAGNOSIS — R41.3 MEMORY LOSS: ICD-10-CM

## 2018-06-27 DIAGNOSIS — F03.90 DEMENTIA WITHOUT BEHAVIORAL DISTURBANCE, UNSPECIFIED DEMENTIA TYPE: Primary | ICD-10-CM

## 2018-06-27 PROCEDURE — 96119 PR NEUROPSYCH TESTING BY TECHNICIAN: CPT | Mod: 59,S$GLB,, | Performed by: PSYCHOLOGIST

## 2018-06-27 PROCEDURE — 96118 PR NEUROPSYCH TESTING BY PSYCH/PHYS: CPT | Mod: 59,S$GLB,, | Performed by: PSYCHOLOGIST

## 2018-06-27 PROCEDURE — 90791 PSYCH DIAGNOSTIC EVALUATION: CPT | Mod: S$GLB,,, | Performed by: PSYCHOLOGIST

## 2018-07-01 ENCOUNTER — TELEPHONE (OUTPATIENT)
Dept: PSYCHIATRY | Facility: CLINIC | Age: 73
End: 2018-07-01

## 2018-07-01 NOTE — PROGRESS NOTES
Psychiatry Initial Visit (PhD/LCSW)    Date: 6/27/2018    CPT Code: 91319    Referred by:  Artem Hood M.D.    Chief complaint/reason for encounter:  Neuropsychological Evaluation    Clinical status of patient:  Outpatient    Met with:  Patient and wife    History of present illness: Mr. Jake Louie is a 73 year old white  male referred for Neuropsychological Evaluation on an outpatient basis due to subjective memory decline since July 2017.  He admitted that his memory is a big time problem. He reported that he forgets what he is doing after he turns around and forgets where he is going. He has to ask his wife what he was doing. He can no longer do math. His wife reported he constantly talks about the same thing, needs help lining up the buttons on his shirts, and needs to be told which shoe goes on which foot. He will forget to put his teeth in before he leaves the house. He misplaces things in the home. She has to remind him about most things. He is easily frustrated but this is nothing new. Upon direct questioning, they also reported that he forgets conversations and events; gets confused about what day it is; needs help managing his medications; needs help managing the family finances; has forgotten to pay bills; loses his train of thought in conversation; and reports word-finding difficulty (not observed).  He drives without difficulty. They reported it seems to be getting worse over time. No precipitant could be identified. Family history is significant for father with Alzheimers disease and schizophrenia. Mr. Louie was briefly placed on Paxil 10 years ago for depression but has no other psychiatric history. He denied depression or anxiety but is irritable. He was cooperative in interview. He often looked to his wife to provide historical information. He was very long winded and prone to telling long convoluted stories. His speech was slightly slurred. He perseverated telling the same story  repeatedly in interview.     Pain scale: noncontributory    Symptoms:  Mood: poor concentration  Anxiety:  decreased memory, irritability  Substance abuse: denied  Cognitive functioning:  memory decline    Psychiatric history: as above    Medical history: memory loss, HTN, diabetes, sleep apnea, cellulitis, and chronic venous insufficiency f the legs. No brain imaging was available.     Family history of psychiatric illness: father with schizophrenia and AFD, brother a psychopath    Social history (marriage, employment, etc.):   Quit school to work in 9th grade. Drafted into the [employer] and served 2 years in [employer]. 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 . No children. Lives with wife. Enjoys going to the gym and swimming.    Substance use:   Alcohol: no   Drugs: remote   Tobacco: smokes 1 ppd   Caffeine: 2 ½ cups coffee daily    Strengths and Liabilities:   Strength:  Pt is expressive/articulate.   Liability:  Pt has subjective memory loss.    Mental Status Exam:  General appearance:  appears stated age, neatly dressed, well groomed  Speech:  slightly slurred, long winded  Level of cooperation:  cooperative  Thought processes:  logical, goal-directed  Mood:  irritable  Thought content:  no illusions, no visual hallucinations, no auditory hallucinations, no delusions, no active or passive homicidal thoughts, no active or passive suicidal ideation, no obsessions, no compulsions, no violence  Affect:  appropriate  Orientation:  oriented to person and place, but not to time - did not know day of the month  Memory:  Recent memory:  1 of 3 objects after brief delay.    Remote memory - poor  Attention span and concentration:  unable to spelled REID backwards  Fund of general knowledge: 1 of 4 recent presidents  Abstract reasoning:    Similarities: abstract.    Proverbs: abstract.  Judgment and insight: poor  Language:  intact    Diagnostic impressions:  Dementia without  behavioral disturbance F03.90  Memory loss R41.3          Plan:  Pt will complete Neuropsychological testing.  Report of Neuropsychological Evaluation will follow. It can be accessed through the Chart Review activity in Epic under the Notes tab.  It will be titled Psych Testing.    Return to clinic:  as scheduled    Length of time:  50 minutes

## 2018-07-01 NOTE — PSYCH TESTING
[facility] [address] [place]  [address]  [phone]    REPORT OF NEUROPSYCHOLOGICAL EVALUATION    NAME:  Jake Louie  OC #:  [id number]  :  1945    REFERRED BY:  Artem Hood M.D.    EVALUATED BY:  Zenaida Arechiga, Ph.D., Clinical Psychologist  Lis Szymanski, Ph.D., Psychometrician    DATE OF EVALUATION:  2018    EVALUATION PROCEDURES AND TIME:  Conducted by Psychologist:  Interpretation and report of test data  Review and integration of diagnostic interview, medical record, and test data  Conducted by Technician:  Temporal Orientation Test   Repeatable Battery for the Assessment of Neuropsychological Status (RBANS)  [place] Naming Test (BNT)  Betancourt Visual Motor Gestalt Test  Wechsler Memory Scale IV Visual Reproduction tests (WMS-VR)  Time: CPT Code 14046 - 2 hours; CPT Code 61849 - 2 hours    EVALUATION FINDINGS:  The diagnostic interview revealed that Mr. Jake Louie is a 73 year old white  male referred for Neuropsychological Evaluation on an outpatient basis due to subjective memory decline since 2017.  He admitted that his memory is a big time problem. He reported that he forgets what he is doing after he turns around and forgets where he is going. He has to ask his wife what he was doing. He can no longer do math. His wife reported he constantly talks about the same thing, needs help lining up the buttons on his shirts, and needs to be told which shoe goes on which foot. He will forget to put his teeth in before he leaves the house. He misplaces things in the home. She has to remind him about most things. He is easily frustrated but this is nothing new. Upon direct questioning, they also reported that he forgets conversations and events; gets confused about what day it is; needs help managing his medications; needs help managing the family finances; has forgotten to pay bills; loses his train of thought in conversation; and reports word-finding  difficulty (not observed).  He drives without difficulty. They reported it seems to be getting worse over time. No precipitant could be identified. Family history is significant for father with Alzheimers disease and schizophrenia. Mr. Louie was briefly placed on Paxil 10 years ago for depression but has no other psychiatric history. He denied current depression or anxiety but is irritable. He was cooperative in interview. He often looked to his wife to provide historical information. He was very long winded and prone to telling long convoluted stories. His speech was slightly slurred. He perseverated telling the same story repeatedly in interview.    The Mental Status Exam suggested reduced temporal orientation, recent and remote memory, concentration, fund of general knowledge, and judgment/insight.    The medical record also revealed memory loss, HTN, diabetes, sleep apnea, cellulitis, and chronic venous insufficiency f the legs. No brain imaging was available.     TEST DATA:  Neuropsychological tests administered by the technician revealed that the patient reported he left school during the 9th grade to work.  He was drafted into the [employer] and served 2 years in [employer] [employer]. 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 He was alert and fairly cooperative during the evaluation, although he was easily frustrated which led to giving up easily when trying to recall memory test items.  Effort on tests was generally satisfactory to produce valid results.    He was oriented to time, month, year, and day of the week, but missed the date by 8 days. His score on this measure suggested severely impaired temporal orientation.    Mr. Louie obtained total score of 51 on the RBANS, which is at the <1st percentile, suggesting severe impairment in general cognitive functioning.  Five areas were tested.  The Immediate Memory Index revealed severe impairment in the ability to remember  verbal information immediately after it is presented, with a score of 53 at the <1stpercentile.  The Visuospatial/Constructional Index revealed severe impairment in the ability to perceive spatial relations and to construct a spatially accurate copy of a drawing, with a score of 60 at the <1stpercentile. The Language Index revealed moderate impairment in the ability to respond verbally to either naming or retrieving learned material, with a score of 74 at the 4th percentile. Naming was intact, but verbal fluency was severely impaired. Attention, or the capacity to remember and manipulate both visually and orally presented information in short-term storage, was severely impaired, with a score of 60 at the <1stpercentile. Delayed memory, or anterograde memory capacity, was severely impaired, with a score of 48 at the <1stpercentile.  For reference, the expected average score on each index is 100, which is at the 50th percentile.    Naming, as assessed by the BNT, was mildly impaired with a score of 33/60 correct without cueing (19h percentile) and he named an additional 6 words when provided the beginning sound of the word.      Verbal comprehension was not formally assessed, but was grossly intact.    Drawings made on the Betancourt Visual Motor Gestalt Test suggested very significant impairment in constructional abilities with even the easiest items being largely unrecognizable.    Performance on the Facial Recognition Test suggested no prosopagnosia was present, as his score was in the average range.    The WMS-IV VR was attempted to further assess memory, however his significant constructional dyspraxia confounded the results.    SUMMARY AND RECOMMENDATIONS:  Mr. Louie was referred for Neuropsychological Evaluation on an outpatient basis due to subjective memory decline since July 2017.  His general cognitive abilities as assessed by the RBANS were within the severely impaired range, with severe impairment in  immediate verbal memory, visuospatial/constructional abilities, attention, and delayed memory; and moderate impairment in language (due to impaired verbal fluency).  Further assessment of specific cognitive abilities revealed deficits in temporal orientation, naming and constructional abilities, with intact facial recognition. Other tests generally administered were considered to be too difficult for an accurate assessment (Trails B, WCST, Stroop, phonemic fluency) and would result in even greater frustration than that already expressed by Mr. Louie.  Neuropsychological testing revealed severe impairment in multiple cognitive domains with concurrent significant decline in function abilities. The pattern is consistent with major neurocognitive disorder or dementia. It is clear Mr. Louie has had a significant decline for a prior level and will likely experience continuing decline and the need for greater assistance on a daily basis. Consideration could be given to the use of agent(s) to preserve/enhance memory.        Interpretation and report and coding were completed on 7/1/2018.

## 2018-07-11 ENCOUNTER — TELEPHONE (OUTPATIENT)
Dept: INTERNAL MEDICINE | Facility: CLINIC | Age: 73
End: 2018-07-11

## 2018-07-11 NOTE — TELEPHONE ENCOUNTER
----- Message from Lyric Shoemaker sent at 7/11/2018 11:32 AM CDT -----  Contact: Sari/Wife/[phone]  Pt's wife is calling to speak with someone in the office in regards to getting pt's memory test results that he had taken on 6/27. Pt states that she has been sending messages and has received no response. Please advise.        Thanks

## 2018-07-12 NOTE — TELEPHONE ENCOUNTER
----- Message from Malgorzata Morales sent at 7/12/2018  1:22 PM CDT -----  Contact: Sari/Spouse/[phone]  Patient is returning a phone call.  Who left a message for the patient: Aly  Does patient know what this is regarding:  Test.  Comments: Thank you!!!

## 2018-07-13 ENCOUNTER — LAB VISIT (OUTPATIENT)
Dept: LAB | Facility: HOSPITAL | Age: 73
End: 2018-07-13
Attending: INTERNAL MEDICINE
Payer: MEDICARE

## 2018-07-13 ENCOUNTER — OFFICE VISIT (OUTPATIENT)
Dept: INTERNAL MEDICINE | Facility: CLINIC | Age: 73
End: 2018-07-13
Payer: MEDICARE

## 2018-07-13 VITALS
SYSTOLIC BLOOD PRESSURE: 128 MMHG | HEIGHT: 71 IN | BODY MASS INDEX: 42.28 KG/M2 | HEART RATE: 76 BPM | OXYGEN SATURATION: 96 % | WEIGHT: 302 LBS | DIASTOLIC BLOOD PRESSURE: 80 MMHG

## 2018-07-13 DIAGNOSIS — F03.90 MAJOR NEUROCOGNITIVE DISORDER: ICD-10-CM

## 2018-07-13 DIAGNOSIS — Z79.899 OTHER LONG TERM (CURRENT) DRUG THERAPY: ICD-10-CM

## 2018-07-13 DIAGNOSIS — F03.90 MAJOR NEUROCOGNITIVE DISORDER: Primary | ICD-10-CM

## 2018-07-13 DIAGNOSIS — E11.9 TYPE 2 DIABETES MELLITUS WITHOUT COMPLICATION, WITHOUT LONG-TERM CURRENT USE OF INSULIN: ICD-10-CM

## 2018-07-13 DIAGNOSIS — Z12.5 ENCOUNTER FOR SCREENING FOR MALIGNANT NEOPLASM OF PROSTATE: ICD-10-CM

## 2018-07-13 DIAGNOSIS — I10 ESSENTIAL HYPERTENSION: ICD-10-CM

## 2018-07-13 DIAGNOSIS — I87.2 CHRONIC VENOUS INSUFFICIENCY: ICD-10-CM

## 2018-07-13 DIAGNOSIS — G47.33 OSA (OBSTRUCTIVE SLEEP APNEA): ICD-10-CM

## 2018-07-13 DIAGNOSIS — E78.5 HYPERLIPIDEMIA, UNSPECIFIED HYPERLIPIDEMIA TYPE: ICD-10-CM

## 2018-07-13 LAB
25(OH)D3+25(OH)D2 SERPL-MCNC: 30 NG/ML
ALBUMIN SERPL BCP-MCNC: 3.5 G/DL
ALP SERPL-CCNC: 89 U/L
ALT SERPL W/O P-5'-P-CCNC: 33 U/L
ANION GAP SERPL CALC-SCNC: 7 MMOL/L
AST SERPL-CCNC: 16 U/L
BASOPHILS # BLD AUTO: 0.05 K/UL
BASOPHILS NFR BLD: 0.7 %
BILIRUB SERPL-MCNC: 0.5 MG/DL
BUN SERPL-MCNC: 19 MG/DL
CALCIUM SERPL-MCNC: 9.5 MG/DL
CHLORIDE SERPL-SCNC: 104 MMOL/L
CHOLEST SERPL-MCNC: 168 MG/DL
CHOLEST/HDLC SERPL: 5.3 {RATIO}
CO2 SERPL-SCNC: 27 MMOL/L
COMPLEXED PSA SERPL-MCNC: 2 NG/ML
CREAT SERPL-MCNC: 0.9 MG/DL
DIFFERENTIAL METHOD: ABNORMAL
EOSINOPHIL # BLD AUTO: 0.1 K/UL
EOSINOPHIL NFR BLD: 1.8 %
ERYTHROCYTE [DISTWIDTH] IN BLOOD BY AUTOMATED COUNT: 15.7 %
EST. GFR  (AFRICAN AMERICAN): >60 ML/MIN/1.73 M^2
EST. GFR  (NON AFRICAN AMERICAN): >60 ML/MIN/1.73 M^2
ESTIMATED AVG GLUCOSE: 117 MG/DL
GLUCOSE SERPL-MCNC: 85 MG/DL
HBA1C MFR BLD HPLC: 5.7 %
HCT VFR BLD AUTO: 47 %
HDLC SERPL-MCNC: 32 MG/DL
HDLC SERPL: 19 %
HGB BLD-MCNC: 15.5 G/DL
IMM GRANULOCYTES # BLD AUTO: 0.02 K/UL
IMM GRANULOCYTES NFR BLD AUTO: 0.3 %
LDLC SERPL CALC-MCNC: 103 MG/DL
LYMPHOCYTES # BLD AUTO: 2 K/UL
LYMPHOCYTES NFR BLD: 28.3 %
MCH RBC QN AUTO: 28.8 PG
MCHC RBC AUTO-ENTMCNC: 33 G/DL
MCV RBC AUTO: 87 FL
MONOCYTES # BLD AUTO: 0.7 K/UL
MONOCYTES NFR BLD: 9.7 %
NEUTROPHILS # BLD AUTO: 4.3 K/UL
NEUTROPHILS NFR BLD: 59.2 %
NONHDLC SERPL-MCNC: 136 MG/DL
NRBC BLD-RTO: 0 /100 WBC
PLATELET # BLD AUTO: 267 K/UL
PMV BLD AUTO: 9.7 FL
POTASSIUM SERPL-SCNC: 4.1 MMOL/L
PROT SERPL-MCNC: 6.8 G/DL
RBC # BLD AUTO: 5.38 M/UL
SODIUM SERPL-SCNC: 138 MMOL/L
TRIGL SERPL-MCNC: 165 MG/DL
TSH SERPL DL<=0.005 MIU/L-ACNC: 1.43 UIU/ML
VIT B12 SERPL-MCNC: 581 PG/ML
WBC # BLD AUTO: 7.22 K/UL

## 2018-07-13 PROCEDURE — 80061 LIPID PANEL: CPT

## 2018-07-13 PROCEDURE — 99499 UNLISTED E&M SERVICE: CPT | Mod: S$GLB,,, | Performed by: INTERNAL MEDICINE

## 2018-07-13 PROCEDURE — 99999 PR PBB SHADOW E&M-EST. PATIENT-LVL IV: CPT | Mod: PBBFAC,,, | Performed by: INTERNAL MEDICINE

## 2018-07-13 PROCEDURE — 36415 COLL VENOUS BLD VENIPUNCTURE: CPT | Mod: PO

## 2018-07-13 PROCEDURE — 99214 OFFICE O/P EST MOD 30 MIN: CPT | Mod: S$GLB,,, | Performed by: INTERNAL MEDICINE

## 2018-07-13 PROCEDURE — 99499 UNLISTED E&M SERVICE: CPT | Mod: ,,, | Performed by: INTERNAL MEDICINE

## 2018-07-13 PROCEDURE — 85025 COMPLETE CBC W/AUTO DIFF WBC: CPT

## 2018-07-13 PROCEDURE — 84153 ASSAY OF PSA TOTAL: CPT

## 2018-07-13 PROCEDURE — 82607 VITAMIN B-12: CPT

## 2018-07-13 PROCEDURE — 86592 SYPHILIS TEST NON-TREP QUAL: CPT

## 2018-07-13 PROCEDURE — 3074F SYST BP LT 130 MM HG: CPT | Mod: CPTII,S$GLB,, | Performed by: INTERNAL MEDICINE

## 2018-07-13 PROCEDURE — 83036 HEMOGLOBIN GLYCOSYLATED A1C: CPT

## 2018-07-13 PROCEDURE — 3044F HG A1C LEVEL LT 7.0%: CPT | Mod: CPTII,S$GLB,, | Performed by: INTERNAL MEDICINE

## 2018-07-13 PROCEDURE — 84443 ASSAY THYROID STIM HORMONE: CPT

## 2018-07-13 PROCEDURE — 82306 VITAMIN D 25 HYDROXY: CPT

## 2018-07-13 PROCEDURE — 80053 COMPREHEN METABOLIC PANEL: CPT

## 2018-07-13 PROCEDURE — 3079F DIAST BP 80-89 MM HG: CPT | Mod: CPTII,S$GLB,, | Performed by: INTERNAL MEDICINE

## 2018-07-13 RX ORDER — MEMANTINE HYDROCHLORIDE 5 MG/1
5 TABLET ORAL 2 TIMES DAILY
Qty: 60 TABLET | Refills: 11 | Status: SHIPPED | OUTPATIENT
Start: 2018-07-13 | End: 2018-12-04 | Stop reason: DRUGHIGH

## 2018-07-13 NOTE — PROGRESS NOTES
REASON FOR VISIT:  This is a 73-year-old male who comes in for a routine   follow-up visit, but the main thing that prompted this is that recently he   underwent a neuropsychological evaluation in the Department of Neuropsychology.    This was done because it was noted that he has been having progressive memory   decline.  It was noted that his general cognitive abilities were in the severely   impaired range and there were multiple cognitive deficits.  They felt that the   pattern of the test was consistent with major neurocognitive disorder, dementia.    It was stated that it was clear that Mr. Louie has had a significant decline   for a prior level and likely to experience continued decline, and will need    assistance on daily basis.  In talking to his wife, he is becoming more   forgetful and his wife and he at times will tend to argue with her in a sense   that he does not feel he is that bad.  He admits that he will tend to forget and   lose things.  His wife manages his medications.    PAST MEDICAL HISTORY:  Hypertension.  Hyperlipidemia.  Type 2 diabetes.  Complex sleep apnea with the use of CPAP, responding well.  Chronic lower extremity edema with venous insufficiency.  Morbid obesity.  Restless leg syndrome.  Right knee chondroplasty  Adenomatous colon polyp in .    SOCIAL HISTORY:  Tobacco use, none.  Alcohol use, none.    FAMILY HISTORY:  Mother is , hypertension and arthritis.  Father   , Parkinson's disease.  One sister, four brothers, three of the brothers   with hypertension.    MEDICATIONS:  Voltaren 75 mg twice a day as needed for shoulder pain.  Lisinopril HCT 20/25 mg.  Metformin 500 mg twice a day.  Pravastatin 20 mg a day.  Vitamin D.    REVIEW OF SYMPTOMS:  He is reporting no pains in the chest, palpitations,   shortness of breath, or abdominal pain.  Bowel function is regular.  No   difficulty urinating.  No nocturia.  No chronic headaches, indigestion, or   heartburn.   He appears to go to bed around 10:00 and wakes up around 5:00.  He   may take one nap during the day, but it is felt that he is doing well with the   use of CPAP.    PHYSICAL EXAMINATION:  VITAL SIGNS:  Weight is 302, pulse 76, blood pressure 128/70.  HEENT:  He has bilateral hearing aids.  NECK:  No thyromegaly.  LUNGS:  Clear.  HEART:  Regular rate and rhythm.  ABDOMEN:  Active bowel sounds, soft, nontender.  No hepatosplenomegaly or   abdominal masses.  PULSES:  2+ carotid pulses, 2+ pedal pulses.  EXTREMITIES:  1+ edema.  Hyperpigmented changes.    ADDENDUM:  He has gotten a notice from Colorectal Department to set up a   colonoscopy and he is followed by a regular audiologist regarding his hearing   aids.  It appears that he still has some hearing impairment.    IMPRESSION:  1.  Neurocognitive decline.  2.  Hypertension.  3.  Type 2 diabetes.  4.  Hyperlipidemia.  5.  Obstructive sleep apnea.  6.  Chronic venous insufficiency.    PLAN:  Routine labs.  Refer to Neurology .  Start off with Namenda 5 mg twice a   day.  Arrange for MRI of the brain.      CHARLENE  dd: 07/13/2018 14:09:36 (CDT)  td: 07/14/2018 03:41:19 (CDT)  Doc ID   #[id number]  Job ID #[id number]    CC:

## 2018-07-14 LAB — RPR SER QL: NORMAL

## 2018-07-14 RX ORDER — DICLOFENAC SODIUM 75 MG/1
TABLET, DELAYED RELEASE ORAL
Qty: 180 TABLET | Refills: 0 | Status: SHIPPED | OUTPATIENT
Start: 2018-07-14 | End: 2020-01-28

## 2018-07-14 NOTE — PROGRESS NOTES
The lab tests results overall was very acceptable       the hemoglobin A1c, diabetic marker looks better    Certainly continue with current medications and ongoing attention to diet, physical activity, weight management    Will follow-up after the MRI study

## 2018-08-01 ENCOUNTER — HOSPITAL ENCOUNTER (OUTPATIENT)
Dept: RADIOLOGY | Facility: HOSPITAL | Age: 73
Discharge: HOME | End: 2018-08-01
Attending: INTERNAL MEDICINE
Payer: MEDICARE

## 2018-08-01 DIAGNOSIS — F03.90 MAJOR NEUROCOGNITIVE DISORDER: ICD-10-CM

## 2018-08-01 PROCEDURE — 70551 MRI BRAIN STEM W/O DYE: CPT | Mod: TC

## 2018-08-01 PROCEDURE — 70551 MRI BRAIN STEM W/O DYE: CPT | Mod: 26,,, | Performed by: RADIOLOGY

## 2018-08-03 ENCOUNTER — DOCUMENTATION ONLY (OUTPATIENT)
Dept: INTERNAL MEDICINE | Facility: CLINIC | Age: 73
End: 2018-08-03

## 2018-08-03 NOTE — PROGRESS NOTES
The recent MRI results was discuss with his wife    Presently Mr. Louie at the [facility], recently diagnosis with  an aortic abdominal aneurysm 8 cm and the  An  endovascular stent is being place    The MRI does show cerebral atrophy with ventricular prominence      it did suggest the possibility of normal pressure hydrocephalus      there is an upcoming appointment follow up Neurology regarding memory impairment and this finding can be addressed      Also his wife will keep me updated regarding his status   at the [facility]       it appears that this was picked up on diagnosis through a screening ultrasound

## 2018-08-13 ENCOUNTER — PATIENT MESSAGE (OUTPATIENT)
Dept: NEUROLOGY | Facility: CLINIC | Age: 73
End: 2018-08-13

## 2018-09-17 ENCOUNTER — PES CALL (OUTPATIENT)
Dept: ADMINISTRATIVE | Facility: CLINIC | Age: 73
End: 2018-09-17

## 2018-09-25 ENCOUNTER — OFFICE VISIT (OUTPATIENT)
Dept: NEUROLOGY | Facility: CLINIC | Age: 73
End: 2018-09-25
Payer: MEDICARE

## 2018-09-25 VITALS
WEIGHT: 298.5 LBS | SYSTOLIC BLOOD PRESSURE: 137 MMHG | HEIGHT: 72 IN | HEART RATE: 64 BPM | BODY MASS INDEX: 40.43 KG/M2 | DIASTOLIC BLOOD PRESSURE: 72 MMHG

## 2018-09-25 DIAGNOSIS — H90.3 SENSORINEURAL HEARING LOSS (SNHL) OF BOTH EARS: ICD-10-CM

## 2018-09-25 DIAGNOSIS — R41.89 COGNITIVE IMPAIRMENT: ICD-10-CM

## 2018-09-25 DIAGNOSIS — F02.80 DEMENTIA IN ALZHEIMER'S DISEASE: ICD-10-CM

## 2018-09-25 DIAGNOSIS — G30.9 DEMENTIA IN ALZHEIMER'S DISEASE: ICD-10-CM

## 2018-09-25 PROCEDURE — 3075F SYST BP GE 130 - 139MM HG: CPT | Mod: CPTII,GC,, | Performed by: PSYCHIATRY & NEUROLOGY

## 2018-09-25 PROCEDURE — 99214 OFFICE O/P EST MOD 30 MIN: CPT | Mod: PBBFAC | Performed by: PSYCHIATRY & NEUROLOGY

## 2018-09-25 PROCEDURE — 3078F DIAST BP <80 MM HG: CPT | Mod: CPTII,GC,, | Performed by: PSYCHIATRY & NEUROLOGY

## 2018-09-25 PROCEDURE — 99999 PR PBB SHADOW E&M-EST. PATIENT-LVL IV: CPT | Mod: PBBFAC,GC,, | Performed by: PSYCHIATRY & NEUROLOGY

## 2018-09-25 PROCEDURE — 1101F PT FALLS ASSESS-DOCD LE1/YR: CPT | Mod: CPTII,GC,, | Performed by: PSYCHIATRY & NEUROLOGY

## 2018-09-25 PROCEDURE — 99204 OFFICE O/P NEW MOD 45 MIN: CPT | Mod: S$PBB,GC,, | Performed by: PSYCHIATRY & NEUROLOGY

## 2018-09-25 RX ORDER — DONEPEZIL HYDROCHLORIDE 5 MG/1
10 TABLET, FILM COATED ORAL NIGHTLY
Qty: 60 TABLET | Refills: 3 | Status: SHIPPED | OUTPATIENT
Start: 2018-09-25 | End: 2019-02-18

## 2018-09-25 NOTE — PROGRESS NOTES
Patient Name: Jake Louie  MRN: [id number]    CC: memory loss    HPI: Jake Louie is a 73 y.o. male with PMHx of DMII, HTN, HLD, bilateral hearing loss who presented to clinic for evaluation of 1 year history of memory loss. Patient is here with his wife who reports that she has noticed memory loss for at least a year prior to that but that it has been worsening this past year.   Things that she has noticed him having trouble with include him losing things, has trouble using a remote control now - did not have that issue previously. Has been more irritable.   He was seen by PCP who ordered an MRI and there was concern for NPH and requested a neurologic evaluation. No history of gait disturbance, urinary incontinence.   Patient is still able to drive without any concerns per wife, in the past 2 months , wife has taken over the finances - he insists that they co-manage finances still - has not had any issues with paying bills, bounced checks or other financial issues however wife taking over most of it given concern for memory in other areas.   No history of strokes, hallucinations.   Patient also has bilateral hearing loss - does not wear hearing aids - loses them intermittently and expensive.    Was started on namenda 5mg BID 2 months prior - not reported improvement in symptoms since being on medication    FHx: dad - schizophrenia, brother - bipolar disorder      ROS:   Review of Systems   Constitutional: Negative for malaise/fatigue. Negative for weight loss.   HENT: Negative for hearing loss.   Eyes: Negative for blurred vision and double vision.   Respiratory: Negative for shortness of breath and stridor.   Cardiovascular: Negative for chest pain and palpitations.   Gastrointestinal: Negative for nausea, vomiting and constipation.   Genitourinary: Negative for frequency. Negative for urgency.   Musculoskeletal: Negative for joint pain. Negative for myalgias and falls.   Skin: Negative for rash.    Neurological: Negative for dizziness and tremors.  Endo/Heme/Allergies: Does not bruise/bleed easily.   Psychiatric/Behavioral: +memory loss. Negative for depression and hallucinations. The patient is not nervous/anxious.      Past Medical History  Past Medical History:   Diagnosis Date    Depression     HEARING LOSS     Hyperlipidemia     Hypertension     JUICE (obstructive sleep apnea)     Personal history of colonic polyps     Restless legs syndrome (RLS)     Sleep apnea        Medications    Current Outpatient Medications:     ascorbic acid (VITAMIN C) 500 MG tablet, Take 500 mg by mouth once daily., Disp: , Rfl:     diclofenac (VOLTAREN) 75 MG EC tablet, TAKE 1 TABLET BY MOUTH TWICE DAILY, Disp: 180 tablet, Rfl: 0    lisinopril-hydrochlorothiazide (PRINZIDE,ZESTORETIC) 20-25 mg Tab, Take 1 tablet by mouth once daily., Disp: 90 tablet, Rfl: 3    memantine (NAMENDA) 5 MG Tab, Take 1 tablet (5 mg total) by mouth 2 (two) times daily., Disp: 60 tablet, Rfl: 11    multivitamin (THERAGRAN) per tablet, Take 1 tablet by mouth once daily.  , Disp: , Rfl:     mupirocin (BACTROBAN) 2 % ointment, AAA bid, Disp: 30 g, Rfl: 2    mupirocin (BACTROBAN) 2 % ointment, APPLY TOPICALLY TWO TIMES A DAY., Disp: 44 g, Rfl: 0    vitamin D 1000 units Tab, Take 185 mg by mouth once daily., Disp: , Rfl:     metformin (GLUCOPHAGE) 500 MG tablet, Take 1 tablet (500 mg total) by mouth 2 (two) times daily with meals., Disp: 180 tablet, Rfl: 3    pravastatin (PRAVACHOL) 20 MG tablet, Take 1 tablet (20 mg total) by mouth once daily., Disp: 90 tablet, Rfl: 3    Allergies  Review of patient's allergies indicates:  No Known Allergies    Social History  Social History     Socioeconomic History    Marital status:      Spouse name: Not on file    Number of children: Not on file    Years of education: Not on file    Highest education level: Not on file   Social Needs    Financial resource strain: Not on file    Food  insecurity - worry: Not on file    Food insecurity - inability: Not on file    Transportation needs - medical: Not on file    Transportation needs - non-medical: Not on file   Occupational History     Employer: OTHER   Tobacco Use    Smoking status: Current Every Day Smoker     Packs/day: 0.50     Years: 50.00     Pack years: 25.00    Smokeless tobacco: Never Used   Substance and Sexual Activity    Alcohol use: No    Drug use: No    Sexual activity: Not on file   Other Topics Concern    Not on file   Social History Narrative    Not on file       Family History  Family History   Problem Relation Age of Onset    Arthritis Mother     Hypertension Mother     Hearing loss Mother     Pneumonia Mother     Parkinsonism Father     Hypertension Brother     Colon cancer Brother     Hypertension Brother     Hypertension Brother        Physical Exam  /72   Pulse 64   Ht 6' (1.829 m)   Wt 135.4 kg (298 lb 8.1 oz)   BMI 40.48 kg/m²     General appearance: Well-developed, well-groomed.     Neurologic Exam: The patient is awake, alert and oriented. Language is fluent. Recent and remote memory are normal. Attention span and concentration are normal. Fund of knowledge is appropriate.     Cranial nerves: pupils are round and reactive to light and accommodation. Visual fields are full to confrontation. Ocular motility is full in all cardinal positions of gaze. Facial sensation is normal to pinprick and light touch. Facial activation is symmetric. Hearing is decreased bilaterally. Palate elevates symmetrically. Shoulder elevation is symmetric and full strength bilaterally. Tongue is midline and neck rotation strength is normal bilaterally. Neck range of motion is normal.     Motor examination of all extremities demonstrates normal bulk and tone in all four limbs. There are no atrophy or fasciculations. Strength is 5/5 in the upper and lower extremities bilaterally without pronator drift.     Sensory  examination is normal to pinprick, vibration and proprioception in the upper and lower extremities bilaterally. Romberg is negative.    Deep tendon reflexes are 2+ and symmetric in the upper and lower extremities bilaterally. Toes are mute bilaterally.     Gait: Normal heel, toe, tandem, and casual gait.    Coordination: Finger to nose and heel to shin testing is normal in both upper and lower extremities.       Lab and Test Results    WBC   Date Value Ref Range Status   07/13/2018 7.22 3.90 - 12.70 K/uL Final   03/01/2017 7.48 3.90 - 12.70 K/uL Final   11/25/2016 8.18 3.90 - 12.70 K/uL Final     Hemoglobin   Date Value Ref Range Status   07/13/2018 15.5 14.0 - 18.0 g/dL Final   03/01/2017 15.0 14.0 - 18.0 g/dL Final   11/25/2016 15.2 14.0 - 18.0 g/dL Final     Hematocrit   Date Value Ref Range Status   07/13/2018 47.0 40.0 - 54.0 % Final   03/01/2017 44.7 40.0 - 54.0 % Final   11/25/2016 46.6 40.0 - 54.0 % Final     Platelets   Date Value Ref Range Status   07/13/2018 267 150 - 350 K/uL Final   03/01/2017 280 150 - 350 K/uL Final   11/25/2016 290 150 - 350 K/uL Final     Glucose   Date Value Ref Range Status   07/13/2018 85 70 - 110 mg/dL Final   07/18/2017 104 70 - 110 mg/dL Final   05/23/2017 207 (H) 70 - 110 mg/dL Final     Sodium   Date Value Ref Range Status   07/13/2018 138 136 - 145 mmol/L Final   07/18/2017 138 136 - 145 mmol/L Final   05/23/2017 138 136 - 145 mmol/L Final     Potassium   Date Value Ref Range Status   07/13/2018 4.1 3.5 - 5.1 mmol/L Final   07/18/2017 4.4 3.5 - 5.1 mmol/L Final   05/23/2017 4.2 3.5 - 5.1 mmol/L Final     Chloride   Date Value Ref Range Status   07/13/2018 104 95 - 110 mmol/L Final   07/18/2017 105 95 - 110 mmol/L Final   05/23/2017 106 95 - 110 mmol/L Final     CO2   Date Value Ref Range Status   07/13/2018 27 23 - 29 mmol/L Final   07/18/2017 27 23 - 29 mmol/L Final   05/23/2017 22 (L) 23 - 29 mmol/L Final     BUN, Bld   Date Value Ref Range Status   07/13/2018 19 8 - 23  mg/dL Final   07/18/2017 21 8 - 23 mg/dL Final   05/23/2017 20 8 - 23 mg/dL Final     Creatinine   Date Value Ref Range Status   07/13/2018 0.9 0.5 - 1.4 mg/dL Final   07/18/2017 1.0 0.5 - 1.4 mg/dL Final   05/23/2017 0.9 0.5 - 1.4 mg/dL Final     Calcium   Date Value Ref Range Status   07/13/2018 9.5 8.7 - 10.5 mg/dL Final   07/18/2017 9.2 8.7 - 10.5 mg/dL Final   05/23/2017 9.2 8.7 - 10.5 mg/dL Final     Alkaline Phosphatase   Date Value Ref Range Status   07/13/2018 89 55 - 135 U/L Final   05/17/2017 87 55 - 135 U/L Final   03/01/2017 89 55 - 135 U/L Final     ALT   Date Value Ref Range Status   07/13/2018 33 10 - 44 U/L Final   05/17/2017 36 10 - 44 U/L Final   03/01/2017 25 10 - 44 U/L Final     AST   Date Value Ref Range Status   07/13/2018 16 10 - 40 U/L Final   05/17/2017 23 10 - 40 U/L Final   03/01/2017 17 10 - 40 U/L Final         Images:    MRI brain wo contrast    Generalized cerebral volume loss with prominence of ventricles and sulci.      Assessment and Plan    Jake Louie is a 73 y.o. male with HTN, DMII, HLD presenting with cognitive impairment. Patient without hearing aids on today and not wanting to complete the MOCA - easily frustrated with the testing and so was not completed in office today.     Plan:    Problem List Items Addressed This Visit        1 - High    Cognitive impairment    Current Assessment & Plan     No signs of NPH on examination today - and progression of symptoms with no reported gait disturbance and just memory loss is atypical for NPH.    Diffuse cerebral atrophy noted on MRI. No signs of parkinsonism on examination either today or reported hallucinations making LBD unlikely. Vascular dementia less likely - no significant strokes or white matter changes noted on imaging.   Differential includes ALzheimers type, FTD - will obtain PET scan.     Will check reversible causes of cognitive impairment not already checked previously - chart reviewed and Vitamin B1 ordered.  Other labs B12, RPR, TSH within normal limits  Continue namenda 5mg BID  Will also start aricept 10mg qpm. Will start at 5mg nightly for 1 month and increase to 10mg nightly         Relevant Medications    donepezil (ARICEPT) 5 MG tablet    Other Relevant Orders    VITAMIN B1 (Completed)    NM Brain Imaging Spect       2     Hearing loss, sensorineural    Current Assessment & Plan     Patient to follow up with PCP to obtain hearing aids. Could be worsening symptoms           Other Visit Diagnoses     Dementia in Alzheimer's disease        Relevant Orders    NM Brain Imaging Spect            Claudia Christianson  Neurology Resident - PGY 4  Department of Neurology  [address] [place] [address]

## 2018-10-01 NOTE — ASSESSMENT & PLAN NOTE
No signs of NPH on examination today - and progression of symptoms with no reported gait disturbance and just memory loss is atypical for NPH.    Diffuse cerebral atrophy noted on MRI. No signs of parkinsonism on examination either today or reported hallucinations making LBD unlikely. Vascular dementia less likely - no significant strokes or white matter changes noted on imaging.   Differential includes ALzheimers type, FTD - will obtain PET scan.     Will check reversible causes of cognitive impairment not already checked previously - chart reviewed and Vitamin B1 ordered. Other labs B12, RPR, TSH within normal limits  Continue namenda 5mg BID  Will also start aricept 10mg qpm. Will start at 5mg nightly for 1 month and increase to 10mg nightly

## 2018-10-01 NOTE — PROGRESS NOTES
I have reviewed the notes, assessments, and/or procedures performed this visit, and I concur with the documentation.    Gemma Maurice MD, MS  [facility]  Department of Neurology  Movement Disorders

## 2018-10-04 ENCOUNTER — TELEPHONE (OUTPATIENT)
Dept: INTERNAL MEDICINE | Facility: CLINIC | Age: 73
End: 2018-10-04

## 2018-10-04 NOTE — TELEPHONE ENCOUNTER
----- Message from Shoshana Lei sent at 10/4/2018 12:04 PM CDT -----  Contact: self [phone]  Patient is calling to speak with someone today regarding some recent medical changes and episodes with [facility]. Please call him back and advise.    Thank you

## 2018-10-10 ENCOUNTER — OFFICE VISIT (OUTPATIENT)
Dept: INTERNAL MEDICINE | Facility: CLINIC | Age: 73
End: 2018-10-10
Payer: MEDICARE

## 2018-10-10 ENCOUNTER — LAB VISIT (OUTPATIENT)
Dept: LAB | Facility: HOSPITAL | Age: 73
End: 2018-10-10
Attending: INTERNAL MEDICINE
Payer: MEDICARE

## 2018-10-10 VITALS
OXYGEN SATURATION: 98 % | BODY MASS INDEX: 40.5 KG/M2 | DIASTOLIC BLOOD PRESSURE: 72 MMHG | WEIGHT: 299 LBS | HEART RATE: 70 BPM | SYSTOLIC BLOOD PRESSURE: 125 MMHG | HEIGHT: 72 IN

## 2018-10-10 DIAGNOSIS — I71.40 ABDOMINAL AORTIC ANEURYSM (AAA) WITHOUT RUPTURE: ICD-10-CM

## 2018-10-10 DIAGNOSIS — E78.5 HYPERLIPIDEMIA, UNSPECIFIED HYPERLIPIDEMIA TYPE: ICD-10-CM

## 2018-10-10 DIAGNOSIS — E11.9 TYPE 2 DIABETES MELLITUS WITHOUT COMPLICATION, WITHOUT LONG-TERM CURRENT USE OF INSULIN: ICD-10-CM

## 2018-10-10 DIAGNOSIS — R73.03 PREDIABETES: ICD-10-CM

## 2018-10-10 DIAGNOSIS — I67.2 CEREBRAL ATHEROSCLEROSIS: ICD-10-CM

## 2018-10-10 DIAGNOSIS — I87.2 CHRONIC VENOUS INSUFFICIENCY: ICD-10-CM

## 2018-10-10 DIAGNOSIS — F03.90 MAJOR NEUROCOGNITIVE DISORDER: ICD-10-CM

## 2018-10-10 DIAGNOSIS — G47.33 OSA (OBSTRUCTIVE SLEEP APNEA): ICD-10-CM

## 2018-10-10 DIAGNOSIS — I10 ESSENTIAL HYPERTENSION: Primary | ICD-10-CM

## 2018-10-10 DIAGNOSIS — I10 ESSENTIAL HYPERTENSION: ICD-10-CM

## 2018-10-10 DIAGNOSIS — G47.30 SLEEP APNEA, UNSPECIFIED TYPE: ICD-10-CM

## 2018-10-10 LAB
ANION GAP SERPL CALC-SCNC: 10 MMOL/L
BUN SERPL-MCNC: 18 MG/DL
CALCIUM SERPL-MCNC: 9.5 MG/DL
CHLORIDE SERPL-SCNC: 104 MMOL/L
CO2 SERPL-SCNC: 26 MMOL/L
CREAT SERPL-MCNC: 0.9 MG/DL
EST. GFR  (AFRICAN AMERICAN): >60 ML/MIN/1.73 M^2
EST. GFR  (NON AFRICAN AMERICAN): >60 ML/MIN/1.73 M^2
GLUCOSE SERPL-MCNC: 91 MG/DL
POTASSIUM SERPL-SCNC: 4.3 MMOL/L
SODIUM SERPL-SCNC: 140 MMOL/L

## 2018-10-10 PROCEDURE — 99214 OFFICE O/P EST MOD 30 MIN: CPT | Mod: PBBFAC,PO | Performed by: INTERNAL MEDICINE

## 2018-10-10 PROCEDURE — 80048 BASIC METABOLIC PNL TOTAL CA: CPT

## 2018-10-10 PROCEDURE — 3044F HG A1C LEVEL LT 7.0%: CPT | Mod: CPTII,,, | Performed by: INTERNAL MEDICINE

## 2018-10-10 PROCEDURE — 83036 HEMOGLOBIN GLYCOSYLATED A1C: CPT

## 2018-10-10 PROCEDURE — 3074F SYST BP LT 130 MM HG: CPT | Mod: CPTII,,, | Performed by: INTERNAL MEDICINE

## 2018-10-10 PROCEDURE — 99999 PR PBB SHADOW E&M-EST. PATIENT-LVL IV: CPT | Mod: PBBFAC,,, | Performed by: INTERNAL MEDICINE

## 2018-10-10 PROCEDURE — 1101F PT FALLS ASSESS-DOCD LE1/YR: CPT | Mod: CPTII,,, | Performed by: INTERNAL MEDICINE

## 2018-10-10 PROCEDURE — 3078F DIAST BP <80 MM HG: CPT | Mod: CPTII,,, | Performed by: INTERNAL MEDICINE

## 2018-10-10 PROCEDURE — 99499 UNLISTED E&M SERVICE: CPT | Mod: S$GLB,,, | Performed by: INTERNAL MEDICINE

## 2018-10-10 PROCEDURE — 36415 COLL VENOUS BLD VENIPUNCTURE: CPT | Mod: PO

## 2018-10-10 PROCEDURE — 99499 UNLISTED E&M SERVICE: CPT | Mod: ,,, | Performed by: INTERNAL MEDICINE

## 2018-10-10 PROCEDURE — 99214 OFFICE O/P EST MOD 30 MIN: CPT | Mod: S$PBB,,, | Performed by: INTERNAL MEDICINE

## 2018-10-10 NOTE — PROGRESS NOTES
PAST MEDICAL HISTORY:  Hypertension.  Hyperlipidemia.  Type 2 diabetes.  Complex sleep apnea with the use of CPAP  Dementia  Chronic lower extremity edema with venous insufficiency.  Morbid obesity.  Restless leg syndrome.  Right knee chondroplasty  Adenomatous colon polyp in 2002.  AAA, Endovascular stetnt    SOCIAL HISTORY:  Tobacco use, none.  Alcohol use, none.    MEDICATIONS:  Lisinopril HCT 20/25 mg.  Metformin 500 mg twice a day.  Pravastatin 20 mg a day.  Vitamin D.  Aricept 5 mg      REASON FOR VISIT:  Mr. Louie is a 73-year-old male who is here with his wife,   comes in for a followup.    After his visit with me July 13th, in view of evaluation for cognitive deficits,   he had an MRI, which did reveal generalized volume loss and prominence of   ventricles, remote left sylvian infarct.  He met with Neurology, did not feel   that he had NPH.  Working diagnosis what looks to have been that of Alzheimer's   dementia or frontotemporal dementia.  There was supposed to be a brain imaging   SPECT scan, but this is still pending.  He is now on Aricept along with Namenda.    No particular side effects have been noted.    Also, in the interim, he was evaluated by the [facility] to get his ears   evaluated.  Somehow, he got an abdominal ultrasound, which I what can tell was   due to a screening.  It revealed a distal abdominal aortic aneurysm.  It led to   having a CTA of the abdomen on July 19th, which revealed an 8 x 6 infrarenal   abdominal aortic aneurysm.  A week later, he underwent an endovascular stent and   treatment.  There has been no change in his medications.  His medications are   noted above.  Presently, he reports no chest pain, shortness of breath or   abdominal pain.  At times, he may have a tendency for constipation and there is   no difficulty urinating.    PHYSICAL EXAMINATION:  VITAL SIGNS:  His weight today is 299 pounds, a pulse rate of 68 to 72, blood   pressure reading 132/72.  LUNGS:   Clear.  HEART:  Regular rate and rhythm with 2/6 systolic murmur from the base to the   apex; this is chronic.  ABDOMEN:  Active bowel sounds, soft, nontender.  PULSES:  2+ carotid pulses.  2+ pedal pulses.    On review of the chart, a year ago, he had a 2D echo with Doppler.  Normal   ejection fraction.  There was an aortic valve sclerosis, but not that of   stenosis.    IMPRESSION:  1.  Hypertension.  2.  Hyperlipidemia.  3.  Type 2 diabetes.  4.  Complex sleep apnea with the use of CPAP.  5.  Venous insufficiency.  6.  Dementia.  7.  Aortic aneurysm, status post stent.    PLAN:  In order to be up-to-date, today we will just get a basic metabolic   profile and hemoglobin A1c.  Arrange for a carotid Doppler.  Follow up with   Neurology and Vascular Medicine over at the [facility].  In a few days, he   might be able to increase his Aricept from 5 to 10 mg.        /mingo [id number] review        JAM/SILVIO  dd: 10/10/2018 14:16:41 (CDT)  td: 10/10/2018 21:38:52 (CDT)  Doc ID   #[id number]  Job ID #[id number]    CC:          Risk Alerts:

## 2018-10-11 LAB
ESTIMATED AVG GLUCOSE: 114 MG/DL
HBA1C MFR BLD HPLC: 5.6 %

## 2018-10-11 RX ORDER — PRAVASTATIN SODIUM 20 MG/1
TABLET ORAL
Qty: 90 TABLET | Refills: 3 | Status: SHIPPED | OUTPATIENT
Start: 2018-10-11 | End: 2019-08-29 | Stop reason: SDUPTHER

## 2018-10-11 RX ORDER — METFORMIN HYDROCHLORIDE 500 MG/1
TABLET ORAL
Qty: 180 TABLET | Refills: 3 | Status: SHIPPED | OUTPATIENT
Start: 2018-10-11 | End: 2019-08-29 | Stop reason: SDUPTHER

## 2018-10-11 RX ORDER — LISINOPRIL AND HYDROCHLOROTHIAZIDE 20; 25 MG/1; MG/1
TABLET ORAL
Qty: 90 TABLET | Refills: 3 | Status: SHIPPED | OUTPATIENT
Start: 2018-10-11 | End: 2019-08-29 | Stop reason: SDUPTHER

## 2018-10-11 NOTE — PROGRESS NOTES
The chemistry tests and hemoglobin A1c reading look good in regard to your blood glucose     this has been a steady improvement within the past year, each time this is been checked    Continue to be attentive to physical activity, diet and weight management    Will arrange for return appointment in about 4 months

## 2018-10-12 ENCOUNTER — TELEPHONE (OUTPATIENT)
Dept: NEUROLOGY | Facility: CLINIC | Age: 73
End: 2018-10-12

## 2018-10-12 NOTE — TELEPHONE ENCOUNTER
Spoke via IM and explained scan is not approve by insurance and once approved will be faxed to [facility] to have schedule.

## 2018-10-12 NOTE — TELEPHONE ENCOUNTER
----- Message from Jose Miguel Shahid sent at 10/12/2018 10:00 AM CDT -----  Needs Advice    Reason for call: Mrs. Louie is calling to schedule NM BRAIN IMAGING        Communication Preference: Mrs. Louie (wife) @ [phone]    Additional Information:

## 2018-10-18 ENCOUNTER — CLINICAL SUPPORT (OUTPATIENT)
Dept: CARDIOLOGY | Facility: CLINIC | Age: 73
End: 2018-10-18
Attending: INTERNAL MEDICINE
Payer: MEDICARE

## 2018-10-18 DIAGNOSIS — I71.40 ABDOMINAL AORTIC ANEURYSM (AAA) WITHOUT RUPTURE: ICD-10-CM

## 2018-10-18 DIAGNOSIS — I67.2 CEREBRAL ATHEROSCLEROSIS: ICD-10-CM

## 2018-10-18 DIAGNOSIS — F03.90 MAJOR NEUROCOGNITIVE DISORDER: ICD-10-CM

## 2018-10-18 DIAGNOSIS — E11.9 TYPE 2 DIABETES MELLITUS WITHOUT COMPLICATION, WITHOUT LONG-TERM CURRENT USE OF INSULIN: ICD-10-CM

## 2018-10-18 DIAGNOSIS — I10 ESSENTIAL HYPERTENSION: ICD-10-CM

## 2018-10-18 LAB — INTERNAL CAROTID STENOSIS: NORMAL

## 2018-10-18 PROCEDURE — 93880 EXTRACRANIAL BILAT STUDY: CPT | Mod: PBBFAC | Performed by: INTERNAL MEDICINE

## 2018-10-26 ENCOUNTER — TELEPHONE (OUTPATIENT)
Dept: NEUROLOGY | Facility: CLINIC | Age: 73
End: 2018-10-26

## 2018-10-26 DIAGNOSIS — G30.9 DEMENTIA IN ALZHEIMER'S DISEASE: ICD-10-CM

## 2018-10-26 DIAGNOSIS — F02.80 DEMENTIA IN ALZHEIMER'S DISEASE: ICD-10-CM

## 2018-10-29 ENCOUNTER — TELEPHONE (OUTPATIENT)
Dept: NEUROLOGY | Facility: CLINIC | Age: 73
End: 2018-10-29

## 2018-10-29 NOTE — TELEPHONE ENCOUNTER
Left a message for the patient to let him know an appointment is scheduled for  on 11/7 at 10:00. He was asked to call back to reschedule if this is not a good day and time for him.

## 2018-11-03 ENCOUNTER — PATIENT MESSAGE (OUTPATIENT)
Dept: NEUROLOGY | Facility: CLINIC | Age: 73
End: 2018-11-03

## 2018-11-07 ENCOUNTER — HOSPITAL ENCOUNTER (OUTPATIENT)
Dept: RADIOLOGY | Facility: HOSPITAL | Age: 73
Discharge: HOME | End: 2018-11-07
Attending: PSYCHIATRY & NEUROLOGY
Payer: MEDICARE

## 2018-11-07 DIAGNOSIS — G30.9 DEMENTIA IN ALZHEIMER'S DISEASE: ICD-10-CM

## 2018-11-07 DIAGNOSIS — F02.80 DEMENTIA IN ALZHEIMER'S DISEASE: ICD-10-CM

## 2018-11-07 PROCEDURE — 78608 BRAIN IMAGING (PET): CPT | Mod: TC

## 2018-11-07 PROCEDURE — 78608 BRAIN IMAGING (PET): CPT | Mod: 26,ICN,, | Performed by: RADIOLOGY

## 2018-11-08 LAB — POCT GLUCOSE: 96 MG/DL (ref 70–110)

## 2018-11-15 ENCOUNTER — TELEPHONE (OUTPATIENT)
Dept: NEUROLOGY | Facility: CLINIC | Age: 73
End: 2018-11-15

## 2018-11-15 NOTE — TELEPHONE ENCOUNTER
----- Message from Ab Edwards sent at 11/15/2018 11:26 AM CST -----  Contact: Sari (spouse ) @ [phone]  Test Results    Type of Test: PET Scan   Date of Test: 11-7  Communication Preference: via phone   Additional Information:

## 2018-11-19 ENCOUNTER — TELEPHONE (OUTPATIENT)
Dept: NEUROLOGY | Facility: CLINIC | Age: 73
End: 2018-11-19

## 2018-11-19 ENCOUNTER — PATIENT MESSAGE (OUTPATIENT)
Dept: NEUROLOGY | Facility: CLINIC | Age: 73
End: 2018-11-19

## 2018-11-19 NOTE — TELEPHONE ENCOUNTER
----- Message from Jose Miguel Shahid sent at 11/19/2018  2:28 PM CST -----  Test Results     Type of Test: PET Scan   Date of Test: 11-7  Communication Preference: Sari (spouse) @ [phone]  Additional Information:

## 2018-12-04 ENCOUNTER — OFFICE VISIT (OUTPATIENT)
Dept: NEUROLOGY | Facility: CLINIC | Age: 73
End: 2018-12-04
Payer: MEDICARE

## 2018-12-04 VITALS
HEIGHT: 72 IN | DIASTOLIC BLOOD PRESSURE: 74 MMHG | WEIGHT: 308.19 LBS | BODY MASS INDEX: 41.74 KG/M2 | HEART RATE: 78 BPM | SYSTOLIC BLOOD PRESSURE: 134 MMHG

## 2018-12-04 DIAGNOSIS — F02.80 ALZHEIMER'S DEMENTIA WITHOUT BEHAVIORAL DISTURBANCE, UNSPECIFIED TIMING OF DEMENTIA ONSET: Primary | ICD-10-CM

## 2018-12-04 DIAGNOSIS — H90.3 SENSORINEURAL HEARING LOSS (SNHL) OF BOTH EARS: ICD-10-CM

## 2018-12-04 DIAGNOSIS — G30.9 ALZHEIMER'S DEMENTIA WITHOUT BEHAVIORAL DISTURBANCE, UNSPECIFIED TIMING OF DEMENTIA ONSET: Primary | ICD-10-CM

## 2018-12-04 PROCEDURE — 99213 OFFICE O/P EST LOW 20 MIN: CPT | Mod: GC,S$GLB,, | Performed by: PSYCHIATRY & NEUROLOGY

## 2018-12-04 PROCEDURE — 99999 PR PBB SHADOW E&M-EST. PATIENT-LVL II: CPT | Mod: PBBFAC,GC,, | Performed by: PSYCHIATRY & NEUROLOGY

## 2018-12-04 PROCEDURE — 3078F DIAST BP <80 MM HG: CPT | Mod: CPTII,GC,S$GLB, | Performed by: PSYCHIATRY & NEUROLOGY

## 2018-12-04 PROCEDURE — 1101F PT FALLS ASSESS-DOCD LE1/YR: CPT | Mod: CPTII,GC,S$GLB, | Performed by: PSYCHIATRY & NEUROLOGY

## 2018-12-04 PROCEDURE — 3075F SYST BP GE 130 - 139MM HG: CPT | Mod: CPTII,GC,S$GLB, | Performed by: PSYCHIATRY & NEUROLOGY

## 2018-12-04 RX ORDER — MEMANTINE HYDROCHLORIDE 10 MG/1
10 TABLET ORAL 2 TIMES DAILY
Qty: 60 TABLET | Refills: 5 | Status: SHIPPED | OUTPATIENT
Start: 2018-12-04 | End: 2019-06-11 | Stop reason: SDUPTHER

## 2018-12-05 ENCOUNTER — IMMUNIZATION (OUTPATIENT)
Dept: PHARMACY | Facility: CLINIC | Age: 73
End: 2018-12-05
Payer: MEDICARE

## 2018-12-09 PROBLEM — F02.80 ALZHEIMER'S DEMENTIA WITHOUT BEHAVIORAL DISTURBANCE: Status: ACTIVE | Noted: 2018-09-25

## 2018-12-09 PROBLEM — G30.9 ALZHEIMER'S DEMENTIA WITHOUT BEHAVIORAL DISTURBANCE: Status: ACTIVE | Noted: 2018-09-25

## 2018-12-09 NOTE — PROGRESS NOTES
Patient Name: Jake Louie  MRN: [id number]    CC: memory loss    Interval history:  On namenda 5mg BID. Was started on aricept last visit which patient did not tolerate - had GI issues. PET scan done showed areas of hypometabolism consistent with Alzheimers disease. Patient here with his wife, does not have hearing aids - lost another pair again. Still driving without any reported concern in that area per wife.   Finances managed by his wife at this time    HPI: Jake Louie is a 73 y.o. male with PMHx of DMII, HTN, HLD, bilateral hearing loss who presented to clinic for evaluation of 1 year history of memory loss. Patient is here with his wife who reports that she has noticed memory loss for at least a year prior to that but that it has been worsening this past year.   Things that she has noticed him having trouble with include him losing things, has trouble using a remote control now - did not have that issue previously. Has been more irritable.   He was seen by PCP who ordered an MRI and there was concern for NPH and requested a neurologic evaluation. No history of gait disturbance, urinary incontinence.   Patient is still able to drive without any concerns per wife, in the past 2 months , wife has taken over the finances - he insists that they co-manage finances still - has not had any issues with paying bills, bounced checks or other financial issues however wife taking over most of it given concern for memory in other areas.   No history of strokes, hallucinations.   Patient also has bilateral hearing loss - does not wear hearing aids - loses them intermittently and expensive.    Was started on namenda 5mg BID 2 months prior - not reported improvement in symptoms since being on medication    FHx: dad - schizophrenia, brother - bipolar disorder      ROS:   Review of Systems   Constitutional: Negative for malaise/fatigue. Negative for weight loss.   HENT: Negative for hearing loss.   Eyes: Negative for  blurred vision and double vision.   Respiratory: Negative for shortness of breath and stridor.   Cardiovascular: Negative for chest pain and palpitations.   Gastrointestinal: Negative for nausea, vomiting and constipation.   Genitourinary: Negative for frequency. Negative for urgency.   Musculoskeletal: Negative for joint pain. Negative for myalgias and falls.   Skin: Negative for rash.   Neurological: Negative for dizziness and tremors.  Endo/Heme/Allergies: Does not bruise/bleed easily.   Psychiatric/Behavioral: +memory loss. Negative for depression and hallucinations. The patient is not nervous/anxious.      Past Medical History  Past Medical History:   Diagnosis Date    Depression     HEARING LOSS     Hyperlipidemia     Hypertension     JUICE (obstructive sleep apnea)     Personal history of colonic polyps     Restless legs syndrome (RLS)     Sleep apnea        Medications    Current Outpatient Medications:     ascorbic acid (VITAMIN C) 500 MG tablet, Take 500 mg by mouth once daily., Disp: , Rfl:     diclofenac (VOLTAREN) 75 MG EC tablet, TAKE 1 TABLET BY MOUTH TWICE DAILY, Disp: 180 tablet, Rfl: 0    donepezil (ARICEPT) 5 MG tablet, Take 2 tablets (10 mg total) by mouth every evening., Disp: 60 tablet, Rfl: 3    influenza (FLUZONE HIGH-DOSE) 180 mcg/0.5 mL vaccine, Inject 0.5ml into the muscle., Disp: 0.5 mL, Rfl: 0    lisinopril-hydrochlorothiazide (PRINZIDE,ZESTORETIC) 20-25 mg Tab, TAKE 1 TABLET EVERY DAY, Disp: 90 tablet, Rfl: 3    memantine (NAMENDA) 10 MG Tab, Take 1 tablet (10 mg total) by mouth 2 (two) times daily., Disp: 60 tablet, Rfl: 5    metFORMIN (GLUCOPHAGE) 500 MG tablet, TAKE 1 TABLET TWICE DAILY WITH MEALS, Disp: 180 tablet, Rfl: 3    multivitamin (THERAGRAN) per tablet, Take 1 tablet by mouth once daily.  , Disp: , Rfl:     mupirocin (BACTROBAN) 2 % ointment, AAA bid, Disp: 30 g, Rfl: 2    mupirocin (BACTROBAN) 2 % ointment, APPLY TOPICALLY TWO TIMES A DAY., Disp: 44 g, Rfl:  0    pravastatin (PRAVACHOL) 20 MG tablet, TAKE 1 TABLET (20 MG TOTAL) BY MOUTH  DAILY., Disp: 90 tablet, Rfl: 3    vitamin D 1000 units Tab, Take 185 mg by mouth once daily., Disp: , Rfl:     Allergies  Review of patient's allergies indicates:  No Known Allergies    Social History  Social History     Socioeconomic History    Marital status:      Spouse name: Not on file    Number of children: Not on file    Years of education: Not on file    Highest education level: Not on file   Social Needs    Financial resource strain: Not on file    Food insecurity - worry: Not on file    Food insecurity - inability: Not on file    Transportation needs - medical: Not on file    Transportation needs - non-medical: Not on file   Occupational History     Employer: OTHER   Tobacco Use    Smoking status: Current Every Day Smoker     Packs/day: 0.50     Years: 50.00     Pack years: 25.00    Smokeless tobacco: Never Used   Substance and Sexual Activity    Alcohol use: No    Drug use: No    Sexual activity: Not on file   Other Topics Concern    Not on file   Social History Narrative    Not on file       Family History  Family History   Problem Relation Age of Onset    Arthritis Mother     Hypertension Mother     Hearing loss Mother     Pneumonia Mother     Parkinsonism Father     Hypertension Brother     Colon cancer Brother     Hypertension Brother     Hypertension Brother        Physical Exam  /74   Pulse 78   Ht 6' (1.829 m)   Wt (!) 139.8 kg (308 lb 3.3 oz)   BMI 41.80 kg/m²     General appearance: Well-developed, well-groomed.     Neurologic Exam: The patient is awake, alert and oriented. Language is fluent. Recent and remote memory are normal. Attention span and concentration are normal. Fund of knowledge is appropriate.     Cranial nerves: pupils are round and reactive to light and accommodation. Visual fields are full to confrontation. Ocular motility is full in all cardinal positions  of gaze. Facial sensation is normal to pinprick and light touch. Facial activation is symmetric. Hearing is decreased bilaterally. Palate elevates symmetrically. Shoulder elevation is symmetric and full strength bilaterally. Tongue is midline and neck rotation strength is normal bilaterally. Neck range of motion is normal.     Motor examination of all extremities demonstrates normal bulk and tone in all four limbs. There are no atrophy or fasciculations. Strength is 5/5 in the upper and lower extremities bilaterally without pronator drift.     Sensory examination is normal to pinprick, vibration and proprioception in the upper and lower extremities bilaterally. Romberg is negative.    Deep tendon reflexes are 2+ and symmetric in the upper and lower extremities bilaterally. Toes are mute bilaterally.     Gait: Normal heel, toe, tandem, and casual gait.    Coordination: Finger to nose and heel to shin testing is normal in both upper and lower extremities.       Lab and Test Results    WBC   Date Value Ref Range Status   07/13/2018 7.22 3.90 - 12.70 K/uL Final   03/01/2017 7.48 3.90 - 12.70 K/uL Final   11/25/2016 8.18 3.90 - 12.70 K/uL Final     Hemoglobin   Date Value Ref Range Status   07/13/2018 15.5 14.0 - 18.0 g/dL Final   03/01/2017 15.0 14.0 - 18.0 g/dL Final   11/25/2016 15.2 14.0 - 18.0 g/dL Final     Hematocrit   Date Value Ref Range Status   07/13/2018 47.0 40.0 - 54.0 % Final   03/01/2017 44.7 40.0 - 54.0 % Final   11/25/2016 46.6 40.0 - 54.0 % Final     Platelets   Date Value Ref Range Status   07/13/2018 267 150 - 350 K/uL Final   03/01/2017 280 150 - 350 K/uL Final   11/25/2016 290 150 - 350 K/uL Final     Glucose   Date Value Ref Range Status   10/10/2018 91 70 - 110 mg/dL Final   07/13/2018 85 70 - 110 mg/dL Final   07/18/2017 104 70 - 110 mg/dL Final     Sodium   Date Value Ref Range Status   10/10/2018 140 136 - 145 mmol/L Final   07/13/2018 138 136 - 145 mmol/L Final   07/18/2017 138 136 - 145  mmol/L Final     Potassium   Date Value Ref Range Status   10/10/2018 4.3 3.5 - 5.1 mmol/L Final   07/13/2018 4.1 3.5 - 5.1 mmol/L Final   07/18/2017 4.4 3.5 - 5.1 mmol/L Final     Chloride   Date Value Ref Range Status   10/10/2018 104 95 - 110 mmol/L Final   07/13/2018 104 95 - 110 mmol/L Final   07/18/2017 105 95 - 110 mmol/L Final     CO2   Date Value Ref Range Status   10/10/2018 26 23 - 29 mmol/L Final   07/13/2018 27 23 - 29 mmol/L Final   07/18/2017 27 23 - 29 mmol/L Final     BUN, Bld   Date Value Ref Range Status   10/10/2018 18 8 - 23 mg/dL Final   07/13/2018 19 8 - 23 mg/dL Final   07/18/2017 21 8 - 23 mg/dL Final     Creatinine   Date Value Ref Range Status   10/10/2018 0.9 0.5 - 1.4 mg/dL Final   07/13/2018 0.9 0.5 - 1.4 mg/dL Final   07/18/2017 1.0 0.5 - 1.4 mg/dL Final     Calcium   Date Value Ref Range Status   10/10/2018 9.5 8.7 - 10.5 mg/dL Final   07/13/2018 9.5 8.7 - 10.5 mg/dL Final   07/18/2017 9.2 8.7 - 10.5 mg/dL Final     Alkaline Phosphatase   Date Value Ref Range Status   07/13/2018 89 55 - 135 U/L Final   05/17/2017 87 55 - 135 U/L Final   03/01/2017 89 55 - 135 U/L Final     ALT   Date Value Ref Range Status   07/13/2018 33 10 - 44 U/L Final   05/17/2017 36 10 - 44 U/L Final   03/01/2017 25 10 - 44 U/L Final     AST   Date Value Ref Range Status   07/13/2018 16 10 - 40 U/L Final   05/17/2017 23 10 - 40 U/L Final   03/01/2017 17 10 - 40 U/L Final         Images:    MRI brain wo contrast (8/1/18)  Generalized cerebral volume loss with prominence of ventricles and sulci.      PET brain (11/7/18)  There is reduced parietal and temporal lobe uptake greater on the right than left.  This distribution may be seen in the correct clinical scenario WITH Alzheimer's disease - this report was clarified with Dr. Collins (radiology)     Neuropsych testing (7/1/18)  His general cognitive abilities as assessed by the RBANS were within the severely impaired range, with severe impairment in immediate  verbal memory, visuospatial/constructional abilities, attention, and delayed memory; and moderate impairment in language (due to impaired verbal fluency).  Further assessment of specific cognitive abilities revealed deficits in temporal orientation, naming and constructional abilities, with intact facial recognition. Other tests generally administered were considered to be too difficult for an accurate assessment (Trails B, WCST, Stroop, phonemic fluency) and would result in even greater frustration than that already expressed by Mr. Louie.  Neuropsychological testing revealed severe impairment in multiple cognitive domains with concurrent significant decline in function abilities. The pattern is consistent with major neurocognitive disorder or dementia. It is clear Mr. Louie has had a significant decline for a prior level and will likely experience continuing decline and the need for greater assistance on a daily basis.      Assessment and Plan    Jake Louie is a 73 y.o. male with HTN, DMII, HLD with dementia likely Alzheimers.     Plan:    Problem List Items Addressed This Visit        1 - High    Alzheimer's dementia without behavioral disturbance - Primary    Overview     Neurpysch testing on 7/2018 suggestive of dementia/neurocognitive disorder  PET scan with pattern consistent with Alzheimers  GI intolerance to aricept         Current Assessment & Plan     Increase namenda to 10mg BID         Relevant Medications    memantine (NAMENDA) 10 MG Tab       2     Hearing loss, sensorineural    Current Assessment & Plan     Patient to follow up with PCP to obtain hearing aids. Could be worsening symptoms             Follow up in 6months or as needed earlier    Claudia Christianson  Neurology Resident - PGY 4  Department of Neurology  [facility] [place] [address]

## 2018-12-11 NOTE — PROGRESS NOTES
I have seen the patient, reviewed the Resident's history and physical, assessment and plan. I have personally interviewed and examined the patient at bedside and agree with the findings.       MD Desmond Butts - Neurology

## 2019-02-04 ENCOUNTER — TELEPHONE (OUTPATIENT)
Dept: SLEEP MEDICINE | Facility: CLINIC | Age: 74
End: 2019-02-04

## 2019-02-04 DIAGNOSIS — G47.33 OSA (OBSTRUCTIVE SLEEP APNEA): Primary | ICD-10-CM

## 2019-02-04 NOTE — TELEPHONE ENCOUNTER
----- Message from Disha Mares sent at 2/4/2019  2:21 PM CST -----  Contact: Zhen @ [facility]  Good morning! This is a former Pt of Dr. Rosario's and the Rx on file from 6/18 is written for FFM, yet Pt uses Nasal mask. Is it possible to get an updated order added for Nasal mask and all supplies? Thank you

## 2019-02-04 NOTE — TELEPHONE ENCOUNTER
LL,    Please schedule with Dr. Shultz or myself, whoever is first available unless pt has a preference.  I have ordered supligia  TY!

## 2019-02-11 ENCOUNTER — LAB VISIT (OUTPATIENT)
Dept: LAB | Facility: HOSPITAL | Age: 74
End: 2019-02-11
Attending: INTERNAL MEDICINE
Payer: MEDICARE

## 2019-02-11 DIAGNOSIS — G47.30 SLEEP APNEA, UNSPECIFIED TYPE: ICD-10-CM

## 2019-02-11 DIAGNOSIS — R73.03 PREDIABETES: ICD-10-CM

## 2019-02-11 DIAGNOSIS — E78.5 HYPERLIPIDEMIA, UNSPECIFIED HYPERLIPIDEMIA TYPE: ICD-10-CM

## 2019-02-11 DIAGNOSIS — I10 ESSENTIAL HYPERTENSION: ICD-10-CM

## 2019-02-11 LAB
ALBUMIN SERPL BCP-MCNC: 3.5 G/DL
ALP SERPL-CCNC: 86 U/L
ALT SERPL W/O P-5'-P-CCNC: 31 U/L
ANION GAP SERPL CALC-SCNC: 8 MMOL/L
AST SERPL-CCNC: 18 U/L
BASOPHILS # BLD AUTO: 0.05 K/UL
BASOPHILS NFR BLD: 0.8 %
BILIRUB SERPL-MCNC: 0.5 MG/DL
BUN SERPL-MCNC: 23 MG/DL
CALCIUM SERPL-MCNC: 9.2 MG/DL
CHLORIDE SERPL-SCNC: 105 MMOL/L
CO2 SERPL-SCNC: 25 MMOL/L
CREAT SERPL-MCNC: 1.1 MG/DL
DIFFERENTIAL METHOD: ABNORMAL
EOSINOPHIL # BLD AUTO: 0.2 K/UL
EOSINOPHIL NFR BLD: 2.4 %
ERYTHROCYTE [DISTWIDTH] IN BLOOD BY AUTOMATED COUNT: 17 %
EST. GFR  (AFRICAN AMERICAN): >60 ML/MIN/1.73 M^2
EST. GFR  (NON AFRICAN AMERICAN): >60 ML/MIN/1.73 M^2
ESTIMATED AVG GLUCOSE: 117 MG/DL
GLUCOSE SERPL-MCNC: 109 MG/DL
HBA1C MFR BLD HPLC: 5.7 %
HCT VFR BLD AUTO: 48.8 %
HGB BLD-MCNC: 15.9 G/DL
IMM GRANULOCYTES # BLD AUTO: 0.02 K/UL
IMM GRANULOCYTES NFR BLD AUTO: 0.3 %
LYMPHOCYTES # BLD AUTO: 2.1 K/UL
LYMPHOCYTES NFR BLD: 34.6 %
MCH RBC QN AUTO: 28.4 PG
MCHC RBC AUTO-ENTMCNC: 32.6 G/DL
MCV RBC AUTO: 87 FL
MONOCYTES # BLD AUTO: 0.7 K/UL
MONOCYTES NFR BLD: 11.2 %
NEUTROPHILS # BLD AUTO: 3.1 K/UL
NEUTROPHILS NFR BLD: 50.7 %
NRBC BLD-RTO: 0 /100 WBC
PLATELET # BLD AUTO: 228 K/UL
PMV BLD AUTO: 9.7 FL
POTASSIUM SERPL-SCNC: 4.2 MMOL/L
PROT SERPL-MCNC: 6.8 G/DL
RBC # BLD AUTO: 5.6 M/UL
SODIUM SERPL-SCNC: 138 MMOL/L
WBC # BLD AUTO: 6.16 K/UL

## 2019-02-11 PROCEDURE — 85025 COMPLETE CBC W/AUTO DIFF WBC: CPT

## 2019-02-11 PROCEDURE — 36415 COLL VENOUS BLD VENIPUNCTURE: CPT | Mod: PO

## 2019-02-11 PROCEDURE — 80053 COMPREHEN METABOLIC PANEL: CPT

## 2019-02-11 PROCEDURE — 83036 HEMOGLOBIN GLYCOSYLATED A1C: CPT

## 2019-02-13 DIAGNOSIS — E11.9 TYPE 2 DIABETES MELLITUS WITHOUT COMPLICATION, UNSPECIFIED WHETHER LONG TERM INSULIN USE: ICD-10-CM

## 2019-02-17 NOTE — PROGRESS NOTES
PAST MEDICAL HISTORY:  Hypertension.  Hyperlipidemia.  Type 2 diabetes.  Complex sleep apnea with the use of CPAP  Dementia  Chronic lower extremity edema with venous insufficiency.  Morbid obesity.  Restless leg syndrome.  Right knee chondroplasty  Adenomatous colon polyp in 2002.  AAA, Endovascular stetnt     SOCIAL HISTORY:  Tobacco use, none.  Alcohol use, none.     MEDICATIONS:  Lisinopril HCT 20/25 mg.  Metformin 500 mg twice a day.  Pravastatin 20 mg a day.  Vitamin D.  Emjdcaf30 mg b.i.d  .Diclofenac 75 mg b.i.d.            REASON FOR VISIT:  This is a 73-year-old male who comes in for a followup.  Main   complaint is for two weeks a pain around his right buttocks, but he also point   to the area over the right lateral thigh, worse when lying on the side.    In addition, he would like to follow up on his aortic aneurysm here at [facility]   instead of [facility].  Wife tells me that he needs to be scheduled for a CT of the   abdomen.  He has had a previous endovascular stent.    He has followed up with Neurology for Alzheimer's dementia.  Aricept was   discontinued.  He is now on Namenda at 10 mg b.i.d.  Wife feels he is able to   tolerate this better.    RECENT LABS:  Chemistry, CBC was normal.  Hemoglobin A1c 5.7.  Lipid profile was   not done, but in July 2018, the reading was 168, improved from before.    REVIEW OF SYMPTOMS:  Doing well with the use of CPAP.  Endorses no chest pain,   shortness of breath, or abdominal pain.  Has regular bowel function.  Reports no   difficulty with urinating, no incontinence.    PHYSICAL EXAMINATION:  VITAL SIGNS:  Weight is 302 pounds, pulse 76, blood pressure 132/70.  NECK:  No thyromegaly.  LUNGS:  Clear.  HEART:  Regular rate and rhythm, II/VI systolic murmur from the base to the   apex.  ABDOMEN:  Active bowel sounds, soft, nontender.  No hepatosplenomegaly or   abdominal masses.  PULSES:  2+ carotid pulses, 2+ pedal pulses.  EXTREMITIES:  1+ edema, this is  chronic.  MUSCULOSKELETAL:  1+ knee and ankle jerk reflex.  With straight leg raise, he   will complain of right buttock pain.  The patient is not tender over the right   trochanteric bursa.    IMPRESSION:  1. Hypertension.  2. Hyperlipidemia.  3. Type 2 diabetes.  4. Complex sleep apnea with the use of CPAP.  5. Dementia.  6. Venous insufficiency.  7. Abdominal aortic aneurysm, status post stent.  8. Right hip pain, probably that of muscular strain or lumbar radiculopathy.    PLAN:  The wife has diclofenac 75 mg given once a day.  He can start taking one   twice a day.  Arrange for a CTA of the abdomen and then phone review to follow   up.  Make sure he has proper fluid intake.        /mingo  [id number]  review        JAM/SILVIO  dd: 02/18/2019 13:40:08 (CST)  td: 02/18/2019 23:10:17 (CST)  Doc ID   #[id number]  Job ID #[id number]    CC:

## 2019-02-18 ENCOUNTER — OFFICE VISIT (OUTPATIENT)
Dept: INTERNAL MEDICINE | Facility: CLINIC | Age: 74
End: 2019-02-18
Payer: MEDICARE

## 2019-02-18 VITALS
HEIGHT: 72 IN | WEIGHT: 302 LBS | BODY MASS INDEX: 40.9 KG/M2 | OXYGEN SATURATION: 96 % | HEART RATE: 80 BPM | SYSTOLIC BLOOD PRESSURE: 132 MMHG | DIASTOLIC BLOOD PRESSURE: 76 MMHG

## 2019-02-18 DIAGNOSIS — E78.5 HYPERLIPIDEMIA, UNSPECIFIED HYPERLIPIDEMIA TYPE: ICD-10-CM

## 2019-02-18 DIAGNOSIS — E11.9 TYPE 2 DIABETES MELLITUS WITHOUT COMPLICATION, WITHOUT LONG-TERM CURRENT USE OF INSULIN: Primary | ICD-10-CM

## 2019-02-18 DIAGNOSIS — F03.90 MAJOR NEUROCOGNITIVE DISORDER: ICD-10-CM

## 2019-02-18 DIAGNOSIS — M25.551 RIGHT HIP PAIN: ICD-10-CM

## 2019-02-18 DIAGNOSIS — I87.2 CHRONIC VENOUS INSUFFICIENCY: ICD-10-CM

## 2019-02-18 DIAGNOSIS — I71.40 ABDOMINAL AORTIC ANEURYSM (AAA) WITHOUT RUPTURE: ICD-10-CM

## 2019-02-18 DIAGNOSIS — I10 ESSENTIAL HYPERTENSION: ICD-10-CM

## 2019-02-18 DIAGNOSIS — G47.33 OSA ON CPAP: ICD-10-CM

## 2019-02-18 PROCEDURE — 3044F PR MOST RECENT HEMOGLOBIN A1C LEVEL <7.0%: ICD-10-PCS | Mod: CPTII,S$GLB,, | Performed by: INTERNAL MEDICINE

## 2019-02-18 PROCEDURE — 99999 PR PBB SHADOW E&M-EST. PATIENT-LVL IV: CPT | Mod: PBBFAC,,, | Performed by: INTERNAL MEDICINE

## 2019-02-18 PROCEDURE — 99999 PR PBB SHADOW E&M-EST. PATIENT-LVL IV: ICD-10-PCS | Mod: PBBFAC,,, | Performed by: INTERNAL MEDICINE

## 2019-02-18 PROCEDURE — 90732 PPSV23 VACC 2 YRS+ SUBQ/IM: CPT | Mod: S$GLB,,, | Performed by: INTERNAL MEDICINE

## 2019-02-18 PROCEDURE — 90732 PNEUMOCOCCAL POLYSACCHARIDE VACCINE 23-VALENT =>2YO SQ IM: ICD-10-PCS | Mod: S$GLB,,, | Performed by: INTERNAL MEDICINE

## 2019-02-18 PROCEDURE — 3078F DIAST BP <80 MM HG: CPT | Mod: CPTII,S$GLB,, | Performed by: INTERNAL MEDICINE

## 2019-02-18 PROCEDURE — 99214 PR OFFICE/OUTPT VISIT, EST, LEVL IV, 30-39 MIN: ICD-10-PCS | Mod: 25,S$GLB,, | Performed by: INTERNAL MEDICINE

## 2019-02-18 PROCEDURE — 99214 OFFICE O/P EST MOD 30 MIN: CPT | Mod: 25,S$GLB,, | Performed by: INTERNAL MEDICINE

## 2019-02-18 PROCEDURE — G0009 PNEUMOCOCCAL POLYSACCHARIDE VACCINE 23-VALENT =>2YO SQ IM: ICD-10-PCS | Mod: S$GLB,,, | Performed by: INTERNAL MEDICINE

## 2019-02-18 PROCEDURE — 1101F PR PT FALLS ASSESS DOC 0-1 FALLS W/OUT INJ PAST YR: ICD-10-PCS | Mod: CPTII,S$GLB,, | Performed by: INTERNAL MEDICINE

## 2019-02-18 PROCEDURE — 3078F PR MOST RECENT DIASTOLIC BLOOD PRESSURE < 80 MM HG: ICD-10-PCS | Mod: CPTII,S$GLB,, | Performed by: INTERNAL MEDICINE

## 2019-02-18 PROCEDURE — 3075F SYST BP GE 130 - 139MM HG: CPT | Mod: CPTII,S$GLB,, | Performed by: INTERNAL MEDICINE

## 2019-02-18 PROCEDURE — G0009 ADMIN PNEUMOCOCCAL VACCINE: HCPCS | Mod: S$GLB,,, | Performed by: INTERNAL MEDICINE

## 2019-02-18 PROCEDURE — 3075F PR MOST RECENT SYSTOLIC BLOOD PRESS GE 130-139MM HG: ICD-10-PCS | Mod: CPTII,S$GLB,, | Performed by: INTERNAL MEDICINE

## 2019-02-18 PROCEDURE — 3044F HG A1C LEVEL LT 7.0%: CPT | Mod: CPTII,S$GLB,, | Performed by: INTERNAL MEDICINE

## 2019-02-18 PROCEDURE — 1101F PT FALLS ASSESS-DOCD LE1/YR: CPT | Mod: CPTII,S$GLB,, | Performed by: INTERNAL MEDICINE

## 2019-02-25 ENCOUNTER — PATIENT MESSAGE (OUTPATIENT)
Dept: SLEEP MEDICINE | Facility: CLINIC | Age: 74
End: 2019-02-25

## 2019-02-25 ENCOUNTER — HOSPITAL ENCOUNTER (OUTPATIENT)
Dept: RADIOLOGY | Facility: HOSPITAL | Age: 74
Discharge: HOME | End: 2019-02-25
Attending: INTERNAL MEDICINE
Payer: MEDICARE

## 2019-02-25 DIAGNOSIS — I71.40 ABDOMINAL AORTIC ANEURYSM (AAA) WITHOUT RUPTURE: ICD-10-CM

## 2019-02-26 ENCOUNTER — TELEPHONE (OUTPATIENT)
Dept: SLEEP MEDICINE | Facility: CLINIC | Age: 74
End: 2019-02-26

## 2019-02-26 ENCOUNTER — TELEPHONE (OUTPATIENT)
Dept: INTERNAL MEDICINE | Facility: CLINIC | Age: 74
End: 2019-02-26

## 2019-02-26 NOTE — TELEPHONE ENCOUNTER
----- Message from Heike Thornton sent at 2/26/2019 10:32 AM CST -----  Contact: Sari Louie (Wife) [phone]  The patient is requesting a call back to discuss the procedure he had scheduled yesterday. Please call to advise

## 2019-02-26 NOTE — TELEPHONE ENCOUNTER
----- Message from Reinlado Bangura sent at 2/26/2019 10:40 AM CST -----  Contact: Sari (spouse)  Type:  Patient Returning Call    Who Called: Sari (spouse)    Who Left Message for Patient: Bing    Does the patient know what this is regarding?: yes, scheduling    Best Call Back Number: [phone]    Additional Information: Offered the patient PA which was refused only wants to see a physician

## 2019-02-26 NOTE — TELEPHONE ENCOUNTER
Pt wife states he was stuck 4 times yesterday they couldn't find a good vein for the patient pt was upset he is scheduled for Monday she want to know if patient need to be hydrated before the test because he has to fast for 4 hours they are scanning the abdomen

## 2019-02-27 ENCOUNTER — TELEPHONE (OUTPATIENT)
Dept: SLEEP MEDICINE | Facility: CLINIC | Age: 74
End: 2019-02-27

## 2019-02-27 NOTE — TELEPHONE ENCOUNTER
Patient had an appointment that was schedule with Dr. Shultz 02/27/2019. Dr. Shultz is currently out of country. Please reschedule the patient appointment.

## 2019-03-04 ENCOUNTER — PATIENT MESSAGE (OUTPATIENT)
Dept: SLEEP MEDICINE | Facility: CLINIC | Age: 74
End: 2019-03-04

## 2019-03-04 ENCOUNTER — HOSPITAL ENCOUNTER (OUTPATIENT)
Dept: RADIOLOGY | Facility: HOSPITAL | Age: 74
Discharge: HOME | End: 2019-03-04
Attending: INTERNAL MEDICINE
Payer: MEDICARE

## 2019-03-04 DIAGNOSIS — I71.40 ABDOMINAL AORTIC ANEURYSM (AAA) WITHOUT RUPTURE: ICD-10-CM

## 2019-03-04 PROCEDURE — 74174 CTA ABDOMEN AND PELVIS: ICD-10-PCS | Mod: 26,,, | Performed by: RADIOLOGY

## 2019-03-04 PROCEDURE — 25500020 PHARM REV CODE 255: Performed by: INTERNAL MEDICINE

## 2019-03-04 PROCEDURE — 74174 CTA ABD&PLVS W/CONTRAST: CPT | Mod: 26,,, | Performed by: RADIOLOGY

## 2019-03-04 PROCEDURE — 74174 CTA ABD&PLVS W/CONTRAST: CPT | Mod: TC

## 2019-03-04 RX ADMIN — IOHEXOL 100 ML: 350 INJECTION, SOLUTION INTRAVENOUS at 01:03

## 2019-03-05 ENCOUNTER — PATIENT MESSAGE (OUTPATIENT)
Dept: INTERNAL MEDICINE | Facility: CLINIC | Age: 74
End: 2019-03-05

## 2019-03-06 ENCOUNTER — OFFICE VISIT (OUTPATIENT)
Dept: INTERNAL MEDICINE | Facility: CLINIC | Age: 74
End: 2019-03-06
Payer: MEDICARE

## 2019-03-06 VITALS
DIASTOLIC BLOOD PRESSURE: 82 MMHG | HEART RATE: 77 BPM | HEIGHT: 72 IN | OXYGEN SATURATION: 98 % | BODY MASS INDEX: 41.17 KG/M2 | SYSTOLIC BLOOD PRESSURE: 130 MMHG | WEIGHT: 304 LBS

## 2019-03-06 DIAGNOSIS — R07.0 THROAT PAIN: ICD-10-CM

## 2019-03-06 DIAGNOSIS — I71.40 ABDOMINAL AORTIC ANEURYSM (AAA) WITHOUT RUPTURE: ICD-10-CM

## 2019-03-06 DIAGNOSIS — R22.1 THROAT SWELLING: Primary | ICD-10-CM

## 2019-03-06 PROCEDURE — 99214 PR OFFICE/OUTPT VISIT, EST, LEVL IV, 30-39 MIN: ICD-10-PCS | Mod: S$GLB,,, | Performed by: INTERNAL MEDICINE

## 2019-03-06 PROCEDURE — 1101F PR PT FALLS ASSESS DOC 0-1 FALLS W/OUT INJ PAST YR: ICD-10-PCS | Mod: CPTII,S$GLB,, | Performed by: INTERNAL MEDICINE

## 2019-03-06 PROCEDURE — 1101F PT FALLS ASSESS-DOCD LE1/YR: CPT | Mod: CPTII,S$GLB,, | Performed by: INTERNAL MEDICINE

## 2019-03-06 PROCEDURE — 99999 PR PBB SHADOW E&M-EST. PATIENT-LVL III: ICD-10-PCS | Mod: PBBFAC,,, | Performed by: INTERNAL MEDICINE

## 2019-03-06 PROCEDURE — 3079F DIAST BP 80-89 MM HG: CPT | Mod: CPTII,S$GLB,, | Performed by: INTERNAL MEDICINE

## 2019-03-06 PROCEDURE — 3075F PR MOST RECENT SYSTOLIC BLOOD PRESS GE 130-139MM HG: ICD-10-PCS | Mod: CPTII,S$GLB,, | Performed by: INTERNAL MEDICINE

## 2019-03-06 PROCEDURE — 99499 RISK ADDL DX/OHS AUDIT: ICD-10-PCS | Mod: HCNC,S$GLB,, | Performed by: INTERNAL MEDICINE

## 2019-03-06 PROCEDURE — 99214 OFFICE O/P EST MOD 30 MIN: CPT | Mod: S$GLB,,, | Performed by: INTERNAL MEDICINE

## 2019-03-06 PROCEDURE — 3075F SYST BP GE 130 - 139MM HG: CPT | Mod: CPTII,S$GLB,, | Performed by: INTERNAL MEDICINE

## 2019-03-06 PROCEDURE — 3079F PR MOST RECENT DIASTOLIC BLOOD PRESSURE 80-89 MM HG: ICD-10-PCS | Mod: CPTII,S$GLB,, | Performed by: INTERNAL MEDICINE

## 2019-03-06 PROCEDURE — 99499 UNLISTED E&M SERVICE: CPT | Mod: HCNC,S$GLB,, | Performed by: INTERNAL MEDICINE

## 2019-03-06 PROCEDURE — 99999 PR PBB SHADOW E&M-EST. PATIENT-LVL III: CPT | Mod: PBBFAC,,, | Performed by: INTERNAL MEDICINE

## 2019-03-06 RX ORDER — SUCRALFATE 1 G/1
1 TABLET ORAL
Qty: 12 TABLET | Refills: 0 | Status: SHIPPED | OUTPATIENT
Start: 2019-03-06 | End: 2019-03-09

## 2019-03-06 RX ORDER — SUCRALFATE 1 G/1
TABLET ORAL
Qty: 360 TABLET | Refills: 0 | OUTPATIENT
Start: 2019-03-06

## 2019-03-06 NOTE — PROGRESS NOTES
PAST MEDICAL HISTORY:  Hypertension.  Hyperlipidemia.  Type 2 diabetes.  Complex sleep apnea with the use of CPAP  Dementia  Chronic lower extremity edema with venous insufficiency.  Morbid obesity.  Restless leg syndrome.  Right knee chondroplasty  Adenomatous colon polyp in 2002.  AAA, Endovascular stetnt     SOCIAL HISTORY:  Tobacco use, none.  Alcohol use, none.     MEDICATIONS:  Lisinopril HCT 20/25 mg.  Metformin 500 mg twice a day.  Pravastatin 20 mg a day.  Vitamin D.  Hdiazws72 mg b.i.d  .Diclofenac 75 mg b.i.d.           REASON FOR VISIT:  This is a 73-year-old male.  He has a swelling and irritation   feeling deep within his throat, anterior neck.  This started yesterday at some   point in afternoon when he attempted to eat a hamburger.  Apparently, he chewed   into a hamburger, it was a big bite and all of a sudden he started choking and   feeling like that it was going to go down the wrong way.  He actually had to   pull it out.  Since that time, he has been feeling the pain and discomfort.  He   is not aware of heartburn, indigestion, or postnasal drip, and does not feel   acutely short of breath.  Normally he has no problems swallowing food or   liquids, but his wife who is with him states that he tends to take big bites or   big portions at once.    ADDENDUM:  He had a CTA of the abdomen in follow-up on his end[facility]   [facility].  It showed that there was an   aneurysm dilatation of 6.3 cm with endovascular stent graft in place extending   into the common iliac arteries.  There was no evidence of endoleak.  Incidental   finding was made of chronic compression deformity at L5 and paraseptal emphysema   changes (he has had a smoking history before).    PAST MEDICAL HISTORY & MEDICATIONS:  Outlined above.& MEDICATIONS:  Outlined   above.    PHYSICAL EXAMINATION:  VITAL SIGNS:  Weight is 304 and blood pressure 130/82.  LUNGS:  Clear.  HEART:  Regular rate and  rhythm.  NECK:  There is no palpable adenopathy.  He is not tender over the lateral   anterior aspect of the neck.  Oropharynx, no abnormal findings.    IMPRESSION:  Throat swelling or pain.    PLAN:  Reflux precautions were discussed as well as proper eating habits.   For   a few days he can take Carafate solution four times a day.  Of note, there is no   improvement and it will be recommended to repeat the CTA in a year's time.        JAM/HN  dd: 03/06/2019 15:28:13 (CST)  td: 03/07/2019 00:10:04 (CST)  Doc ID   #[id number]  Job ID #[id number]    CC:

## 2019-04-11 ENCOUNTER — TELEPHONE (OUTPATIENT)
Dept: NEUROLOGY | Facility: CLINIC | Age: 74
End: 2019-04-11

## 2019-04-11 NOTE — TELEPHONE ENCOUNTER
Left a message for the patient wife to let her know an appointment is scheduled for the patient 6/11 at 3:00. She was asked to call back to reschedule if this is not a good day and time for him.

## 2019-04-11 NOTE — TELEPHONE ENCOUNTER
----- Message from Adamaris Andersen sent at 4/10/2019  2:18 PM CDT -----  Contact: Sari (wife) @ [phone]  Calling to schedule an appt with Dr. Christianson per the recall letter. Please call.

## 2019-04-12 RX ORDER — MUPIROCIN 20 MG/G
OINTMENT TOPICAL 2 TIMES DAILY
Qty: 44 G | Refills: 0 | Status: SHIPPED | OUTPATIENT
Start: 2019-04-12 | End: 2020-08-27

## 2019-05-02 ENCOUNTER — OFFICE VISIT (OUTPATIENT)
Dept: SLEEP MEDICINE | Facility: CLINIC | Age: 74
End: 2019-05-02
Payer: MEDICARE

## 2019-05-02 VITALS
BODY MASS INDEX: 40.29 KG/M2 | WEIGHT: 304 LBS | SYSTOLIC BLOOD PRESSURE: 110 MMHG | HEIGHT: 73 IN | DIASTOLIC BLOOD PRESSURE: 68 MMHG | HEART RATE: 80 BPM

## 2019-05-02 DIAGNOSIS — G47.31 COMPLEX SLEEP APNEA SYNDROME: Primary | ICD-10-CM

## 2019-05-02 DIAGNOSIS — E66.01 MORBID OBESITY: ICD-10-CM

## 2019-05-02 PROCEDURE — 1101F PR PT FALLS ASSESS DOC 0-1 FALLS W/OUT INJ PAST YR: ICD-10-PCS | Mod: CPTII,S$GLB,, | Performed by: INTERNAL MEDICINE

## 2019-05-02 PROCEDURE — 3074F SYST BP LT 130 MM HG: CPT | Mod: CPTII,S$GLB,, | Performed by: INTERNAL MEDICINE

## 2019-05-02 PROCEDURE — 99499 RISK ADDL DX/OHS AUDIT: ICD-10-PCS | Mod: HCNC,S$GLB,, | Performed by: INTERNAL MEDICINE

## 2019-05-02 PROCEDURE — 1101F PT FALLS ASSESS-DOCD LE1/YR: CPT | Mod: CPTII,S$GLB,, | Performed by: INTERNAL MEDICINE

## 2019-05-02 PROCEDURE — 99214 OFFICE O/P EST MOD 30 MIN: CPT | Mod: S$GLB,,, | Performed by: INTERNAL MEDICINE

## 2019-05-02 PROCEDURE — 99999 PR PBB SHADOW E&M-EST. PATIENT-LVL III: ICD-10-PCS | Mod: PBBFAC,,, | Performed by: INTERNAL MEDICINE

## 2019-05-02 PROCEDURE — 99214 PR OFFICE/OUTPT VISIT, EST, LEVL IV, 30-39 MIN: ICD-10-PCS | Mod: S$GLB,,, | Performed by: INTERNAL MEDICINE

## 2019-05-02 PROCEDURE — 3078F DIAST BP <80 MM HG: CPT | Mod: CPTII,S$GLB,, | Performed by: INTERNAL MEDICINE

## 2019-05-02 PROCEDURE — 99999 PR PBB SHADOW E&M-EST. PATIENT-LVL III: CPT | Mod: PBBFAC,,, | Performed by: INTERNAL MEDICINE

## 2019-05-02 PROCEDURE — 99499 UNLISTED E&M SERVICE: CPT | Mod: HCNC,S$GLB,, | Performed by: INTERNAL MEDICINE

## 2019-05-02 PROCEDURE — 3078F PR MOST RECENT DIASTOLIC BLOOD PRESSURE < 80 MM HG: ICD-10-PCS | Mod: CPTII,S$GLB,, | Performed by: INTERNAL MEDICINE

## 2019-05-02 PROCEDURE — 3074F PR MOST RECENT SYSTOLIC BLOOD PRESSURE < 130 MM HG: ICD-10-PCS | Mod: CPTII,S$GLB,, | Performed by: INTERNAL MEDICINE

## 2019-05-02 NOTE — PATIENT INSTRUCTIONS
Sleep Hygiene Practices    1. Try going to bed only when you are drowsy.    ?    2. If you are unable to fall asleep or stay asleep, leave your bedroom and engage in a quiet activity elsewhere. Do not permit yourself to fall asleep outside the bedroom. Return to bed when and only when you are sleepy. Repeat this process as often as necessary throughout night.    3. Maintain regular wake-up time, even on days off work & weekends    4. Use your bedroom for sleep and sex    5. Avoid napping during the daytime. If daytime sleepiness becomes overwhelming, limit nap time to a single nap of less than 1hr, no later than 3pm.    6. Distract your mind. Avoid clock watching. Lying in bed unable to sleep and frustrated needs to be avoided. Try reading or watching a videotape or listening to books on tape. It may be necessary to go into another room to do these.    7. Avoid caffeine within 4-6hrs of bedtime    8. Avoid use of nicotine close to bedtime    9. do not drink alcoholic beverages within 4-6hrs of bedtime    10. While a light snack before bedtime can help promote sound sleep, avoid large meals.    11. Obtain regular exercise, but avoid strenuous exercise within 4hrs of bedtime    12. Minimize light, noise, and extremes in temperature in the bedroom.    13. Precautions: The patient was advised to abstain from driving should they feel sleepy or drowsy.  ?    * This information is provided had been directly obtained from the [employer] wellness booklet on sleep hygiene. ([facility], [address] [place] [address]

## 2019-05-02 NOTE — PROGRESS NOTES
"05/2/2019    Pt is following in regards to the complex sleep apnea and he is waking up multiple times and 2-3 times and goes back to sleep immediately and some time snoring and having leaking from his mask and he is using nasal mask and uses chin strap. He denies any dry mouth and dry nose. He denies bloating. He denies  Fatigue and tiredness. He does nap every other day hr to hr and half. He denies any issues with the machine using very old one as per patient has tried which he did not like.       THERAPY HRS: 9043  FLEX: 3    ESS= 6           03/13/2019    This 73 y.o. male s/p UPPP returns for the management of complex sleep apnea.     Patient reports marked improvement of his sleep with ASV BiPAP. Better sleep maintenance.   He feels like he needs more air.  He is using ASV BIPAP machine on a nightly basis. His PS is set for 5, but I have requested 10 cm in the past    Patient reports prior resolution of the air hunger with increasing PS to min 10. "I'm out as soon as I put it on".    No problems using the ASV. He did get a replaced machine, which has the same settings.  He moved his data card from older to new machine.  He is using a nasal (True Blue) MW/L mask. He attempted a full mask, but felt he couldn't breathe right, so he went back to his nasal mask.   He does use a chin strap "all time". Red and black band "full head gear".    No longer fatigued during the day. He recently stopped swimming, taking a break.  Weight loss of 3 lbs since last visit.    His most recent complaint was 3 am awakenings, but this seemed to have resolved spontaneously.  He tried temazepam, sonata, and Trazodone 50 mg, but has discontinued.    EPWORTH SLEEPINESS SCALE 3/13/2018   Sitting and reading 0   Watching TV 3   Sitting, inactive in a public place (e.g. a theatre or a meeting) 0   As a passenger in a car for an hour without a break 0   Lying down to rest in the afternoon when circumstances permit 0   Sitting and talking to " someone 0   Sitting quietly after a lunch without alcohol 0   In a car, while stopped for a few minutes in traffic 0   Total score 3       ASV interrogation: 7,756 hours; machine appears to be cycling appropriately. ASV settings; Max 25, PS max 15 PS min 5; EPAP min 5 EPAP max 10    DIAGNOSTIC PSG: Most recent overnight PSG demonstrated complex sleep apnea. He had an AHI in severe range (AHI >100). With application of PAP, central apneas became apparent, which did not clear with up-titration. Sleep disordered breathing was finally relieved with application of biPAP 19/10 cm with back up rate 8.  Patient did not tolerate biPAP with back up rate.     ASV TITRATION 9/17/13: Effective improvement of complex sleep apnea with ASV at default settings, EPAP min set at 5 cm.    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 He eventually was sent up on auto titrating BiPAP, which he tolerated, and felt better as a result. He later had UPPP, but had residual JUICE by sleep study done in [place] (results not available). He was started back on CPAP after 2003, but he had difficulty tolerating it. He feels that main issues is wrong machine. He would like to be considered for bipap, because he felt he slept much better with this device than CPAP. JUICE symptoms: unable to sleep well, gasping/stop breathing in sleep, and excessive tiredness during the day    BASELINE PSG 1/19/98 ( report) -prior UPPP: +JUICE, , low sat 90%.    SLEEP ROUTINE:  Time to bed: 8-10pm  Sleep onset latency: 30-60 mins  Disruptions or awakenings: 1-2 x  Wakeup time: 4 am   Perceived sleep quality: 2 out of 5    PAST MEDICAL/ FAMILY / SOCIAL HISTORY:  reviewed in EPIC  Past Medical History:   Diagnosis Date    Depression     HEARING LOSS     Hyperlipidemia     Hypertension     JUICE (obstructive sleep apnea)     Personal history of colonic polyps     Restless legs syndrome (RLS)     Sleep apnea   "      Past Surgical History:   Procedure Laterality Date    COLONOSCOPY N/A 3/1/2013    Performed by MARY Valdez [facility] at [facility] (4[facility] FLR)    KNEE SURGERY      KNEE SURGERY      leg fx  repair       Family History   Problem Relation Age of Onset    Arthritis Mother     Hypertension Mother     Hearing loss Mother     Pneumonia Mother     Parkinsonism Father     Hypertension Brother     Colon cancer Brother     Hypertension Brother     Hypertension Brother        Social History     Tobacco Use    Smoking status: Current Every Day Smoker     Packs/day: 0.50     Years: 50.00     Pack years: 25.00    Smokeless tobacco: Never Used   Substance Use Topics    Alcohol use: No    Drug use: No       REVIEW OF SYSTEMS:    Weight down 3 lbs;   Denies sinus problems; Denies dyspnea;   Mood okay    ALLERGIES: Reviewed in EPIC    CURRENT MEDICATIONS: Reviewed in EPIC      PHYSICAL EXAM:  /68   Pulse 80   Ht 6' 1" (1.854 m)   Wt (!) 137.9 kg (304 lb)   BMI 40.11 kg/m²      GENERAL: Obese body habitus, well groomed  Physical Exam  Neck size: 22 inch  Oral: burris IV, high arch, mild over bite.  Nose: mild nasal congestion b/l  CVS: S1+S2 ,negative murmur/ gallop, regularly regular  Lungs: CTA B/L  Abdomen: BS+, no guarding, - rigidity.  Ext: negative pedal edema B/L LE     His ECHO 5/25/2017 showed EF is 60%, so he falls within a different patient population than SERVE-HF study population.                                        ASSESSMENT:    1. Complex sleep apnea (CompSA) with emerging central apnea with standard PAP modalities. Currently on the ASV  BIPAP.  He is s/p UPPP with prior symptoms of ongoing gasping for air in sleep, disrupted sleep, and daytime tiredness without PAP therapy.  He has gained weight and current ESS= 6 , continues to nap with out the PAP therapy. Will request to have down load the chip through the DME and if needed would request a new machine.The patient has medical " co-morbidities of hypertension, and obesity (BMI >30). Will suggest following:      Complex sleep apnea syndrome    Morbid obesity  -     Ambulatory Referral to Bariatric Medicine            PLAN:    Complex sleep apnea:    1. Continue  ASV BIPAP at current settings; EPAP min 5 cm; PS to min 10, so that minimum starting pressure is 15/5 cm (to minimize air hunger); Max pressure would be 25/10.would suggested to have chip read by DME to get the down load data and if needed will switch to newer machine. Suggested to avoid naps and to use the PAP therapy when napping. Suggested to avoid supine sleep and to have good sleep hygiene.       Obesity  General weight loss/lifestyle modification strategies discussed (elicit support from others; identify saboteurs; non-food rewards, etc).  Diet interventions: qualitative changes (increase low-fat,  high-fiber foods).  refered to bariatric medicine         Precautions: The patient was advised to abstain from driving should he feel sleepy or drowsy.    Follow up: 3 months. MD

## 2019-05-06 ENCOUNTER — TELEPHONE (OUTPATIENT)
Dept: SLEEP MEDICINE | Facility: CLINIC | Age: 74
End: 2019-05-06

## 2019-05-06 NOTE — TELEPHONE ENCOUNTER
----- Message from Lucie Ventura sent at 5/6/2019 10:29 AM CDT -----  Contact: pt's wife  Name of Who is Calling:Sari      What is the request in detail: requesting new orders be placed for the pt to get a new CPAP machine. Please call and advise when the orders has been placed.      Can the clinic reply by [facility]: yes      What Number to Call Back if not in [facility]: [phone]

## 2019-05-07 ENCOUNTER — PATIENT MESSAGE (OUTPATIENT)
Dept: SLEEP MEDICINE | Facility: CLINIC | Age: 74
End: 2019-05-07

## 2019-05-08 DIAGNOSIS — G47.31 COMPLEX SLEEP APNEA SYNDROME: Primary | ICD-10-CM

## 2019-05-09 ENCOUNTER — OFFICE VISIT (OUTPATIENT)
Dept: ORTHOPEDICS | Facility: CLINIC | Age: 74
End: 2019-05-09
Payer: MEDICARE

## 2019-05-09 VITALS
BODY MASS INDEX: 40.94 KG/M2 | HEIGHT: 73 IN | DIASTOLIC BLOOD PRESSURE: 71 MMHG | SYSTOLIC BLOOD PRESSURE: 120 MMHG | WEIGHT: 308.88 LBS | HEART RATE: 68 BPM

## 2019-05-09 DIAGNOSIS — M17.11 PRIMARY OSTEOARTHRITIS OF RIGHT KNEE: Primary | ICD-10-CM

## 2019-05-09 PROCEDURE — 20610 PR DRAIN/INJECT LARGE JOINT/BURSA: ICD-10-PCS | Mod: RT,S$GLB,, | Performed by: PHYSICIAN ASSISTANT

## 2019-05-09 PROCEDURE — 99999 PR PBB SHADOW E&M-EST. PATIENT-LVL III: ICD-10-PCS | Mod: PBBFAC,,, | Performed by: PHYSICIAN ASSISTANT

## 2019-05-09 PROCEDURE — 99213 OFFICE O/P EST LOW 20 MIN: CPT | Mod: 25,S$GLB,, | Performed by: PHYSICIAN ASSISTANT

## 2019-05-09 PROCEDURE — 20610 DRAIN/INJ JOINT/BURSA W/O US: CPT | Mod: RT,S$GLB,, | Performed by: PHYSICIAN ASSISTANT

## 2019-05-09 PROCEDURE — 1101F PR PT FALLS ASSESS DOC 0-1 FALLS W/OUT INJ PAST YR: ICD-10-PCS | Mod: CPTII,S$GLB,, | Performed by: PHYSICIAN ASSISTANT

## 2019-05-09 PROCEDURE — 3074F PR MOST RECENT SYSTOLIC BLOOD PRESSURE < 130 MM HG: ICD-10-PCS | Mod: CPTII,S$GLB,, | Performed by: PHYSICIAN ASSISTANT

## 2019-05-09 PROCEDURE — 99213 PR OFFICE/OUTPT VISIT, EST, LEVL III, 20-29 MIN: ICD-10-PCS | Mod: 25,S$GLB,, | Performed by: PHYSICIAN ASSISTANT

## 2019-05-09 PROCEDURE — 99999 PR PBB SHADOW E&M-EST. PATIENT-LVL III: CPT | Mod: PBBFAC,,, | Performed by: PHYSICIAN ASSISTANT

## 2019-05-09 PROCEDURE — 3078F PR MOST RECENT DIASTOLIC BLOOD PRESSURE < 80 MM HG: ICD-10-PCS | Mod: CPTII,S$GLB,, | Performed by: PHYSICIAN ASSISTANT

## 2019-05-09 PROCEDURE — 3078F DIAST BP <80 MM HG: CPT | Mod: CPTII,S$GLB,, | Performed by: PHYSICIAN ASSISTANT

## 2019-05-09 PROCEDURE — 3074F SYST BP LT 130 MM HG: CPT | Mod: CPTII,S$GLB,, | Performed by: PHYSICIAN ASSISTANT

## 2019-05-09 PROCEDURE — 1101F PT FALLS ASSESS-DOCD LE1/YR: CPT | Mod: CPTII,S$GLB,, | Performed by: PHYSICIAN ASSISTANT

## 2019-05-09 RX ORDER — METHYLPREDNISOLONE ACETATE 80 MG/ML
80 INJECTION, SUSPENSION INTRA-ARTICULAR; INTRALESIONAL; INTRAMUSCULAR; SOFT TISSUE
Status: COMPLETED | OUTPATIENT
Start: 2019-05-09 | End: 2019-05-09

## 2019-05-09 RX ADMIN — METHYLPREDNISOLONE ACETATE 80 MG: 80 INJECTION, SUSPENSION INTRA-ARTICULAR; INTRALESIONAL; INTRAMUSCULAR; SOFT TISSUE at 12:05

## 2019-05-09 NOTE — PROGRESS NOTES
Subjective:      Patient ID: Jake Louie is a 73 y.o. male.    Chief Complaint: No chief complaint on file.    HPI  73 year old male presents with chief complaint of right knee pain. No trauma. He has h/o OA. He received cortisone injections in the past with good relief. Last injection was 1 year ago. He has pain with walking.   Review of Systems   Constitution: Negative for chills, fever and night sweats.   Cardiovascular: Negative for chest pain.   Respiratory: Negative for cough and shortness of breath.    Hematologic/Lymphatic: Does not bruise/bleed easily.   Skin: Negative for color change.   Gastrointestinal: Negative for heartburn.   Genitourinary: Negative for dysuria.   Neurological: Negative for numbness and paresthesias.   Psychiatric/Behavioral: Negative for altered mental status.   Allergic/Immunologic: Negative for persistent infections.         Objective:            General    Vitals reviewed.  Constitutional: He is oriented to person, place, and time. He appears well-developed and well-nourished.   Cardiovascular: Normal rate.    Neurological: He is alert and oriented to person, place, and time.             Bilateral Knee Exam:  ROM 0-120 degrees  No effusion  No warmth or erythema  TTP medial joint line      X-ray: reviewed by myself.  DJD present.       Assessment:       Encounter Diagnosis   Name Primary?    Primary osteoarthritis of right knee Yes          Plan:       Patient would like to receive right knee cortisone injection. RTC prn.     PROCEDURE:  I have explained the risks, benefits, and alternatives of the procedure in detail.  The patient voices understanding and all questions have been answered.  The patient agrees to proceed as planned. So after I performed a sterile prep of the skin in the normal fashion the right knee is injected using a 22 gauge needle from the anterolateral approach with a combination of 4cc 1% plain lidocaine and 80 mg of depo medrol.  The patient is  cautioned and immediate relief of pain is secondary to the local anesthetic and will be temporary.  After the anesthetic wears off there may be a increase in pain that may last for a few hours or a few days and they should use ice to help alleviate this flair up of pain.

## 2019-05-31 ENCOUNTER — TELEPHONE (OUTPATIENT)
Dept: INTERNAL MEDICINE | Facility: CLINIC | Age: 74
End: 2019-05-31

## 2019-06-02 ENCOUNTER — PATIENT MESSAGE (OUTPATIENT)
Dept: SLEEP MEDICINE | Facility: CLINIC | Age: 74
End: 2019-06-02

## 2019-06-05 ENCOUNTER — TELEPHONE (OUTPATIENT)
Dept: SLEEP MEDICINE | Facility: CLINIC | Age: 74
End: 2019-06-05

## 2019-06-11 ENCOUNTER — OFFICE VISIT (OUTPATIENT)
Dept: NEUROLOGY | Facility: CLINIC | Age: 74
End: 2019-06-11
Payer: MEDICARE

## 2019-06-11 VITALS
DIASTOLIC BLOOD PRESSURE: 76 MMHG | WEIGHT: 311.31 LBS | HEART RATE: 70 BPM | HEIGHT: 73 IN | BODY MASS INDEX: 41.26 KG/M2 | SYSTOLIC BLOOD PRESSURE: 139 MMHG

## 2019-06-11 DIAGNOSIS — F02.80 ALZHEIMER'S DEMENTIA WITHOUT BEHAVIORAL DISTURBANCE, UNSPECIFIED TIMING OF DEMENTIA ONSET: ICD-10-CM

## 2019-06-11 DIAGNOSIS — H90.3 SENSORINEURAL HEARING LOSS (SNHL) OF BOTH EARS: ICD-10-CM

## 2019-06-11 DIAGNOSIS — G30.9 ALZHEIMER'S DEMENTIA WITHOUT BEHAVIORAL DISTURBANCE, UNSPECIFIED TIMING OF DEMENTIA ONSET: ICD-10-CM

## 2019-06-11 PROCEDURE — 1101F PR PT FALLS ASSESS DOC 0-1 FALLS W/OUT INJ PAST YR: ICD-10-PCS | Mod: CPTII,GC,S$GLB, | Performed by: PSYCHIATRY & NEUROLOGY

## 2019-06-11 PROCEDURE — 99213 OFFICE O/P EST LOW 20 MIN: CPT | Mod: GC,S$GLB,, | Performed by: PSYCHIATRY & NEUROLOGY

## 2019-06-11 PROCEDURE — 99213 PR OFFICE/OUTPT VISIT, EST, LEVL III, 20-29 MIN: ICD-10-PCS | Mod: GC,S$GLB,, | Performed by: PSYCHIATRY & NEUROLOGY

## 2019-06-11 PROCEDURE — 3078F DIAST BP <80 MM HG: CPT | Mod: CPTII,GC,S$GLB, | Performed by: PSYCHIATRY & NEUROLOGY

## 2019-06-11 PROCEDURE — 99999 PR PBB SHADOW E&M-EST. PATIENT-LVL III: CPT | Mod: PBBFAC,GC,, | Performed by: PSYCHIATRY & NEUROLOGY

## 2019-06-11 PROCEDURE — 3075F SYST BP GE 130 - 139MM HG: CPT | Mod: CPTII,GC,S$GLB, | Performed by: PSYCHIATRY & NEUROLOGY

## 2019-06-11 PROCEDURE — 3078F PR MOST RECENT DIASTOLIC BLOOD PRESSURE < 80 MM HG: ICD-10-PCS | Mod: CPTII,GC,S$GLB, | Performed by: PSYCHIATRY & NEUROLOGY

## 2019-06-11 PROCEDURE — 3075F PR MOST RECENT SYSTOLIC BLOOD PRESS GE 130-139MM HG: ICD-10-PCS | Mod: CPTII,GC,S$GLB, | Performed by: PSYCHIATRY & NEUROLOGY

## 2019-06-11 PROCEDURE — 1101F PT FALLS ASSESS-DOCD LE1/YR: CPT | Mod: CPTII,GC,S$GLB, | Performed by: PSYCHIATRY & NEUROLOGY

## 2019-06-11 PROCEDURE — 99999 PR PBB SHADOW E&M-EST. PATIENT-LVL III: ICD-10-PCS | Mod: PBBFAC,GC,, | Performed by: PSYCHIATRY & NEUROLOGY

## 2019-06-11 RX ORDER — MEMANTINE HYDROCHLORIDE 10 MG/1
10 TABLET ORAL 2 TIMES DAILY
Qty: 60 TABLET | Refills: 5 | Status: SHIPPED | OUTPATIENT
Start: 2019-06-11 | End: 2020-02-12 | Stop reason: SDUPTHER

## 2019-06-17 NOTE — PROGRESS NOTES
"Patient Name: Jake Louie  MRN: [id number]    CC: memory loss.    Interval history  Patient on namenda 10mg BID, tolerating medication well. Got hearing aids now. Wife feels like his memory has been stable and not worsened since the last visit. They are just here for a routine follow up. She also feels like the namenda "calms him down" as well    Interval history:  On namenda 5mg BID. Was started on aricept last visit which patient did not tolerate - had GI issues. PET scan done showed areas of hypometabolism consistent with Alzheimers disease. Patient here with his wife, does not have hearing aids - lost another pair again. Still driving without any reported concern in that area per wife.   Finances managed by his wife at this time    HPI: Jake Louie is a 74 y.o. male with PMHx of DMII, HTN, HLD, bilateral hearing loss who presented to clinic for evaluation of 1 year history of memory loss. Patient is here with his wife who reports that she has noticed memory loss for at least a year prior to that but that it has been worsening this past year.   Things that she has noticed him having trouble with include him losing things, has trouble using a remote control now - did not have that issue previously. Has been more irritable.   He was seen by PCP who ordered an MRI and there was concern for NPH and requested a neurologic evaluation. No history of gait disturbance, urinary incontinence.   Patient is still able to drive without any concerns per wife, in the past 2 months , wife has taken over the finances - he insists that they co-manage finances still - has not had any issues with paying bills, bounced checks or other financial issues however wife taking over most of it given concern for memory in other areas.   No history of strokes, hallucinations.   Patient also has bilateral hearing loss - does not wear hearing aids - loses them intermittently and expensive.    Was started on namenda 5mg BID 2 months " prior - not reported improvement in symptoms since being on medication    FHx: dad - schizophrenia, brother - bipolar disorder      ROS:   Review of Systems   Constitutional: Negative for malaise/fatigue. Negative for weight loss.   HENT: Negative for hearing loss.   Eyes: Negative for blurred vision and double vision.   Respiratory: Negative for shortness of breath and stridor.   Cardiovascular: Negative for chest pain and palpitations.   Gastrointestinal: Negative for nausea, vomiting and constipation.   Genitourinary: Negative for frequency. Negative for urgency.   Musculoskeletal: Negative for joint pain. Negative for myalgias and falls.   Skin: Negative for rash.   Neurological: Negative for dizziness and tremors.  Endo/Heme/Allergies: Does not bruise/bleed easily.   Psychiatric/Behavioral: +memory loss. Negative for depression and hallucinations. The patient is not nervous/anxious.      Past Medical History  Past Medical History:   Diagnosis Date    Depression     HEARING LOSS     Hyperlipidemia     Hypertension     JUICE (obstructive sleep apnea)     Personal history of colonic polyps     Restless legs syndrome (RLS)     Sleep apnea        Medications    Current Outpatient Medications:     ascorbic acid (VITAMIN C) 500 MG tablet, Take 500 mg by mouth once daily., Disp: , Rfl:     diclofenac (VOLTAREN) 75 MG EC tablet, TAKE 1 TABLET BY MOUTH TWICE DAILY, Disp: 180 tablet, Rfl: 0    influenza (FLUZONE HIGH-DOSE) 180 mcg/0.5 mL vaccine, Inject 0.5ml into the muscle., Disp: 0.5 mL, Rfl: 0    lisinopril-hydrochlorothiazide (PRINZIDE,ZESTORETIC) 20-25 mg Tab, TAKE 1 TABLET EVERY DAY, Disp: 90 tablet, Rfl: 3    memantine (NAMENDA) 10 MG Tab, Take 1 tablet (10 mg total) by mouth 2 (two) times daily., Disp: 60 tablet, Rfl: 5    metFORMIN (GLUCOPHAGE) 500 MG tablet, TAKE 1 TABLET TWICE DAILY WITH MEALS, Disp: 180 tablet, Rfl: 3    multivitamin (THERAGRAN) per tablet, Take 1 tablet by mouth once daily.  ,  Disp: , Rfl:     mupirocin (BACTROBAN) 2 % ointment, AAA bid, Disp: 30 g, Rfl: 2    mupirocin (BACTROBAN) 2 % ointment, APPLY TOPICALLY TWO TIMES A DAY., Disp: 44 g, Rfl: 0    pravastatin (PRAVACHOL) 20 MG tablet, TAKE 1 TABLET (20 MG TOTAL) BY MOUTH  DAILY., Disp: 90 tablet, Rfl: 3    vitamin D 1000 units Tab, Take 185 mg by mouth once daily., Disp: , Rfl:     Allergies  Review of patient's allergies indicates:  No Known Allergies    Social History  Social History     Socioeconomic History    Marital status:      Spouse name: Not on file    Number of children: Not on file    Years of education: Not on file    Highest education level: Not on file   Occupational History     Employer: OTHER   Social Needs    Financial resource strain: Not on file    Food insecurity:     Worry: Not on file     Inability: Not on file    Transportation needs:     Medical: Not on file     Non-medical: Not on file   Tobacco Use    Smoking status: Current Every Day Smoker     Packs/day: 0.50     Years: 50.00     Pack years: 25.00    Smokeless tobacco: Never Used   Substance and Sexual Activity    Alcohol use: No    Drug use: No    Sexual activity: Not on file   Lifestyle    Physical activity:     Days per week: Not on file     Minutes per session: Not on file    Stress: Not on file   Relationships    Social connections:     Talks on phone: Not on file     Gets together: Not on file     Attends Spiritism service: Not on file     Active member of club or organization: Not on file     Attends meetings of clubs or organizations: Not on file     Relationship status: Not on file   Other Topics Concern    Not on file   Social History Narrative    Not on file       Family History  Family History   Problem Relation Age of Onset    Arthritis Mother     Hypertension Mother     Hearing loss Mother     Pneumonia Mother     Parkinsonism Father     Hypertension Brother     Colon cancer Brother     Hypertension Brother   "   Hypertension Brother        Physical Exam  /76   Pulse 70   Ht 6' 1" (1.854 m)   Wt (!) 141.2 kg (311 lb 4.6 oz)   BMI 41.07 kg/m²     General appearance: Well-developed, well-groomed.     Neurologic Exam: The patient is awake, alert and oriented. Language is fluent. Recent and remote memory are normal. Attention span and concentration are normal. Fund of knowledge is appropriate.     Cranial nerves: pupils are round and reactive to light and accommodation. Visual fields are full to confrontation. Ocular motility is full in all cardinal positions of gaze. Facial sensation is normal to pinprick and light touch. Facial activation is symmetric. Hearing is decreased bilaterally. Palate elevates symmetrically. Shoulder elevation is symmetric and full strength bilaterally. Tongue is midline and neck rotation strength is normal bilaterally. Neck range of motion is normal.     Motor examination of all extremities demonstrates normal bulk and tone in all four limbs. There are no atrophy or fasciculations. Strength is 5/5 in the upper and lower extremities bilaterally without pronator drift.     Sensory examination is normal to pinprick, vibration and proprioception in the upper and lower extremities bilaterally. Romberg is negative.    Deep tendon reflexes are 2+ and symmetric in the upper and lower extremities bilaterally. Toes are mute bilaterally.     Gait: Normal heel, toe, tandem, and casual gait.    Coordination: Finger to nose and heel to shin testing is normal in both upper and lower extremities.       Lab and Test Results    WBC   Date Value Ref Range Status   02/11/2019 6.16 3.90 - 12.70 K/uL Final   07/13/2018 7.22 3.90 - 12.70 K/uL Final   03/01/2017 7.48 3.90 - 12.70 K/uL Final     Hemoglobin   Date Value Ref Range Status   02/11/2019 15.9 14.0 - 18.0 g/dL Final   07/13/2018 15.5 14.0 - 18.0 g/dL Final   03/01/2017 15.0 14.0 - 18.0 g/dL Final     Hematocrit   Date Value Ref Range Status "   02/11/2019 48.8 40.0 - 54.0 % Final   07/13/2018 47.0 40.0 - 54.0 % Final   03/01/2017 44.7 40.0 - 54.0 % Final     Platelets   Date Value Ref Range Status   02/11/2019 228 150 - 350 K/uL Final   07/13/2018 267 150 - 350 K/uL Final   03/01/2017 280 150 - 350 K/uL Final     Glucose   Date Value Ref Range Status   02/11/2019 109 70 - 110 mg/dL Final   10/10/2018 91 70 - 110 mg/dL Final   07/13/2018 85 70 - 110 mg/dL Final     Sodium   Date Value Ref Range Status   02/11/2019 138 136 - 145 mmol/L Final   10/10/2018 140 136 - 145 mmol/L Final   07/13/2018 138 136 - 145 mmol/L Final     Potassium   Date Value Ref Range Status   02/11/2019 4.2 3.5 - 5.1 mmol/L Final   10/10/2018 4.3 3.5 - 5.1 mmol/L Final   07/13/2018 4.1 3.5 - 5.1 mmol/L Final     Chloride   Date Value Ref Range Status   02/11/2019 105 95 - 110 mmol/L Final   10/10/2018 104 95 - 110 mmol/L Final   07/13/2018 104 95 - 110 mmol/L Final     CO2   Date Value Ref Range Status   02/11/2019 25 23 - 29 mmol/L Final   10/10/2018 26 23 - 29 mmol/L Final   07/13/2018 27 23 - 29 mmol/L Final     BUN, Bld   Date Value Ref Range Status   02/11/2019 23 8 - 23 mg/dL Final   10/10/2018 18 8 - 23 mg/dL Final   07/13/2018 19 8 - 23 mg/dL Final     Creatinine   Date Value Ref Range Status   02/11/2019 1.1 0.5 - 1.4 mg/dL Final   10/10/2018 0.9 0.5 - 1.4 mg/dL Final   07/13/2018 0.9 0.5 - 1.4 mg/dL Final     Calcium   Date Value Ref Range Status   02/11/2019 9.2 8.7 - 10.5 mg/dL Final   10/10/2018 9.5 8.7 - 10.5 mg/dL Final   07/13/2018 9.5 8.7 - 10.5 mg/dL Final     Alkaline Phosphatase   Date Value Ref Range Status   02/11/2019 86 55 - 135 U/L Final   07/13/2018 89 55 - 135 U/L Final   05/17/2017 87 55 - 135 U/L Final     ALT   Date Value Ref Range Status   02/11/2019 31 10 - 44 U/L Final   07/13/2018 33 10 - 44 U/L Final   05/17/2017 36 10 - 44 U/L Final     AST   Date Value Ref Range Status   02/11/2019 18 10 - 40 U/L Final   07/13/2018 16 10 - 40 U/L Final    05/17/2017 23 10 - 40 U/L Final         Images:    MRI brain wo contrast (8/1/18)  Generalized cerebral volume loss with prominence of ventricles and sulci.      PET brain (11/7/18)  There is reduced parietal and temporal lobe uptake greater on the right than left.  This distribution may be seen in the correct clinical scenario WITH Alzheimer's disease - this report was clarified with Dr. Collins (radiology)     Neuropsych testing (7/1/18)  His general cognitive abilities as assessed by the [facility] were within the severely impaired range, with severe impairment in immediate verbal memory, visuospatial/constructional abilities, attention, and delayed memory; and moderate impairment in language (due to impaired verbal fluency).  Further assessment of specific cognitive abilities revealed deficits in temporal orientation, naming and constructional abilities, with intact facial recognition. Other tests generally administered were considered to be too difficult for an accurate assessment (Trails B, WCST, Stroop, phonemic fluency) and would result in even greater frustration than that already expressed by Mr. Louie.  Neuropsychological testing revealed severe impairment in multiple cognitive domains with concurrent significant decline in function abilities. The pattern is consistent with major neurocognitive disorder or dementia. It is clear Mr. Louie has had a significant decline for a prior level and will likely experience continuing decline and the need for greater assistance on a daily basis.      Assessment and Plan    Jake Louie is a 74 y.o. male with HTN, DMII, HLD with dementia likely Alzheimers.     Plan:    Problem List Items Addressed This Visit        1 - High    Alzheimer's dementia without behavioral disturbance    Overview     Neurpysch testing on 7/2018 suggestive of dementia/neurocognitive disorder  PET scan with pattern consistent with Alzheimers  GI intolerance to aricept         Current  Assessment & Plan     Continue namenda 10mg BID         Relevant Medications    memantine (NAMENDA) 10 MG Tab       2     Hearing loss, sensorineural    Current Assessment & Plan     Has hearing aids now             Follow up in 6months or as needed earlier    Claudia Christianson  Neurology Resident - PGY 4  Department of Neurology  [facility] [place] [address]

## 2019-07-22 DIAGNOSIS — E11.9 TYPE 2 DIABETES MELLITUS WITHOUT COMPLICATION: ICD-10-CM

## 2019-07-31 ENCOUNTER — PATIENT OUTREACH (OUTPATIENT)
Dept: ADMINISTRATIVE | Facility: OTHER | Age: 74
End: 2019-07-31

## 2019-08-01 ENCOUNTER — PATIENT MESSAGE (OUTPATIENT)
Dept: SLEEP MEDICINE | Facility: CLINIC | Age: 74
End: 2019-08-01

## 2019-08-02 ENCOUNTER — TELEPHONE (OUTPATIENT)
Dept: SLEEP MEDICINE | Facility: CLINIC | Age: 74
End: 2019-08-02

## 2019-08-21 NOTE — PROGRESS NOTES
PAST MEDICAL HISTORY:  Hypertension.  Hyperlipidemia.  Type 2 diabetes.  Complex sleep apnea with the use of CPAP  Dementia  Chronic lower extremity edema with venous insufficiency.  Morbid obesity.  Restless leg syndrome.  Right knee chondroplasty  Adenomatous colon polyp in 2002.  AAA, Endovascular stetnt     SOCIAL HISTORY:  Tobacco use, none.  Alcohol use, none.     MEDICATIONS:  Lisinopril HCT 20/25 mg.  Metformin 500 mg twice a day.  Pravastatin 20 mg a day.  Vitamin D.  [id number] mg b.i.d        REASON FOR VISIT:  This is a 74-year-old male.  He feels he has a diarrhea   issue.   As soon as he has  a bowel function, it just breaks up, might be   explosive.  In the past, it used to be one solid stool or clump.  He is not   having abdominal pain.  Once in a while he might feel a sharp pain.  He might   defecate once or twice a day.  There appears to be no fecal urgency.  His last   colonoscopy was in 2013.  There has been a remote history of colon polyps back   in 2002.    His wife also states that a few times every couple of hours, he will wake up and   then go back to bed.  He just does that.  He does not have to get up to   urinate.  He has obstructive sleep apnea and uses CPAP, which he has no problems   in using.    PAST MEDICAL HISTORY & MEDICATIONS:  Outlined above.& MEDICATIONS:  Outlined   above.    PHYSICAL EXAMINATION:  VITAL SIGNS:  Weight of 308 pounds, pulse 80, and blood pressure 130/82.  LUNGS:  Clear.  HEART:  Regular rate and rhythm.  ABDOMEN:  Active bowel sounds, soft, nontender.  RECTAL:  Stool is brown, loose, heme negative.    IMPRESSION:  1. Loose stools, possibly may be related to the metformin.  2. Obstructive sleep apnea.  3. Sleep disturbance.  4. Type 2 diabetes.  5. Hypertension.  6. Alzheimer's dementia.    PLAN:  Today we will arrange for a CBC, basic metabolic profile, and hemoglobin   A1c.  If all is well, may be able to put a hold on the metformin, at least take   it once a  day.  Recommend taking Metamucil twice a day.  Regarding the sleep,   consider taking over-the-counter melatonin before bedtime; otherwise, another   option could be the use of trazodone or Remeron.        JAM/HN  dd: 08/22/2019 14:58:41 (CDT)  td: 08/23/2019 08:37:26 (CDT)  Doc ID   #[id number]  Job ID #[id number]    CC:

## 2019-08-22 ENCOUNTER — OFFICE VISIT (OUTPATIENT)
Dept: INTERNAL MEDICINE | Facility: CLINIC | Age: 74
End: 2019-08-22
Payer: MEDICARE

## 2019-08-22 ENCOUNTER — LAB VISIT (OUTPATIENT)
Dept: LAB | Facility: HOSPITAL | Age: 74
End: 2019-08-22
Attending: INTERNAL MEDICINE
Payer: MEDICARE

## 2019-08-22 VITALS
HEART RATE: 80 BPM | WEIGHT: 308 LBS | OXYGEN SATURATION: 97 % | SYSTOLIC BLOOD PRESSURE: 136 MMHG | DIASTOLIC BLOOD PRESSURE: 82 MMHG | BODY MASS INDEX: 40.82 KG/M2 | HEIGHT: 73 IN

## 2019-08-22 DIAGNOSIS — E11.9 TYPE 2 DIABETES MELLITUS WITHOUT COMPLICATION, WITHOUT LONG-TERM CURRENT USE OF INSULIN: ICD-10-CM

## 2019-08-22 DIAGNOSIS — G47.33 OSA ON CPAP: ICD-10-CM

## 2019-08-22 DIAGNOSIS — R19.5 LOOSE STOOLS: Primary | ICD-10-CM

## 2019-08-22 DIAGNOSIS — I10 ESSENTIAL HYPERTENSION: ICD-10-CM

## 2019-08-22 DIAGNOSIS — G47.9 SLEEP DISORDER: ICD-10-CM

## 2019-08-22 DIAGNOSIS — R19.5 LOOSE STOOLS: ICD-10-CM

## 2019-08-22 LAB
ANION GAP SERPL CALC-SCNC: 9 MMOL/L (ref 8–16)
BUN SERPL-MCNC: 23 MG/DL (ref 8–23)
CALCIUM SERPL-MCNC: 9.8 MG/DL (ref 8.7–10.5)
CHLORIDE SERPL-SCNC: 104 MMOL/L (ref 95–110)
CO2 SERPL-SCNC: 25 MMOL/L (ref 23–29)
CREAT SERPL-MCNC: 1 MG/DL (ref 0.5–1.4)
EST. GFR  (AFRICAN AMERICAN): >60 ML/MIN/1.73 M^2
EST. GFR  (NON AFRICAN AMERICAN): >60 ML/MIN/1.73 M^2
ESTIMATED AVG GLUCOSE: 131 MG/DL (ref 68–131)
GLUCOSE SERPL-MCNC: 105 MG/DL (ref 70–110)
HBA1C MFR BLD HPLC: 6.2 % (ref 4–5.6)
POTASSIUM SERPL-SCNC: 4 MMOL/L (ref 3.5–5.1)
SODIUM SERPL-SCNC: 138 MMOL/L (ref 136–145)

## 2019-08-22 PROCEDURE — 3075F PR MOST RECENT SYSTOLIC BLOOD PRESS GE 130-139MM HG: ICD-10-PCS | Mod: HCNC,CPTII,S$GLB, | Performed by: INTERNAL MEDICINE

## 2019-08-22 PROCEDURE — 80048 BASIC METABOLIC PNL TOTAL CA: CPT | Mod: HCNC

## 2019-08-22 PROCEDURE — 3079F DIAST BP 80-89 MM HG: CPT | Mod: HCNC,CPTII,S$GLB, | Performed by: INTERNAL MEDICINE

## 2019-08-22 PROCEDURE — 99214 OFFICE O/P EST MOD 30 MIN: CPT | Mod: HCNC,S$GLB,, | Performed by: INTERNAL MEDICINE

## 2019-08-22 PROCEDURE — 3079F PR MOST RECENT DIASTOLIC BLOOD PRESSURE 80-89 MM HG: ICD-10-PCS | Mod: HCNC,CPTII,S$GLB, | Performed by: INTERNAL MEDICINE

## 2019-08-22 PROCEDURE — 99999 PR PBB SHADOW E&M-EST. PATIENT-LVL IV: ICD-10-PCS | Mod: PBBFAC,HCNC,, | Performed by: INTERNAL MEDICINE

## 2019-08-22 PROCEDURE — 36415 COLL VENOUS BLD VENIPUNCTURE: CPT | Mod: HCNC,PO

## 2019-08-22 PROCEDURE — 1101F PT FALLS ASSESS-DOCD LE1/YR: CPT | Mod: HCNC,CPTII,S$GLB, | Performed by: INTERNAL MEDICINE

## 2019-08-22 PROCEDURE — 99999 PR PBB SHADOW E&M-EST. PATIENT-LVL IV: CPT | Mod: PBBFAC,HCNC,, | Performed by: INTERNAL MEDICINE

## 2019-08-22 PROCEDURE — 3044F PR MOST RECENT HEMOGLOBIN A1C LEVEL <7.0%: ICD-10-PCS | Mod: HCNC,CPTII,S$GLB, | Performed by: INTERNAL MEDICINE

## 2019-08-22 PROCEDURE — 1101F PR PT FALLS ASSESS DOC 0-1 FALLS W/OUT INJ PAST YR: ICD-10-PCS | Mod: HCNC,CPTII,S$GLB, | Performed by: INTERNAL MEDICINE

## 2019-08-22 PROCEDURE — 83036 HEMOGLOBIN GLYCOSYLATED A1C: CPT | Mod: HCNC

## 2019-08-22 PROCEDURE — 3044F HG A1C LEVEL LT 7.0%: CPT | Mod: HCNC,CPTII,S$GLB, | Performed by: INTERNAL MEDICINE

## 2019-08-22 PROCEDURE — 3075F SYST BP GE 130 - 139MM HG: CPT | Mod: HCNC,CPTII,S$GLB, | Performed by: INTERNAL MEDICINE

## 2019-08-22 PROCEDURE — 99214 PR OFFICE/OUTPT VISIT, EST, LEVL IV, 30-39 MIN: ICD-10-PCS | Mod: HCNC,S$GLB,, | Performed by: INTERNAL MEDICINE

## 2019-08-23 ENCOUNTER — TELEPHONE (OUTPATIENT)
Dept: INTERNAL MEDICINE | Facility: CLINIC | Age: 74
End: 2019-08-23

## 2019-08-23 NOTE — TELEPHONE ENCOUNTER
----- Message from Vandana Amador sent at 8/23/2019  9:45 AM CDT -----  Regarding: Lab Client Services  Contact: [phone]  Hi my name is Vandana I work in the Lab Client Services. We had a problem with some lab work on this patient. If someone from your office could call us at [phone] or [phone] that would be great. Anyone in my department can help. Thank you

## 2019-08-24 ENCOUNTER — PATIENT MESSAGE (OUTPATIENT)
Dept: INTERNAL MEDICINE | Facility: CLINIC | Age: 74
End: 2019-08-24

## 2019-08-29 DIAGNOSIS — E78.5 HYPERLIPIDEMIA, UNSPECIFIED HYPERLIPIDEMIA TYPE: ICD-10-CM

## 2019-08-29 DIAGNOSIS — G47.30 SLEEP APNEA, UNSPECIFIED TYPE: ICD-10-CM

## 2019-08-29 DIAGNOSIS — R73.03 PREDIABETES: ICD-10-CM

## 2019-08-29 DIAGNOSIS — I10 ESSENTIAL HYPERTENSION: ICD-10-CM

## 2019-08-29 RX ORDER — METFORMIN HYDROCHLORIDE 500 MG/1
TABLET ORAL
Qty: 180 TABLET | Refills: 3 | Status: SHIPPED | OUTPATIENT
Start: 2019-08-29 | End: 2020-09-23

## 2019-08-29 RX ORDER — LISINOPRIL AND HYDROCHLOROTHIAZIDE 20; 25 MG/1; MG/1
TABLET ORAL
Qty: 90 TABLET | Refills: 3 | Status: SHIPPED | OUTPATIENT
Start: 2019-08-29 | End: 2020-09-09

## 2019-08-29 RX ORDER — PRAVASTATIN SODIUM 20 MG/1
TABLET ORAL
Qty: 90 TABLET | Refills: 3 | Status: SHIPPED | OUTPATIENT
Start: 2019-08-29 | End: 2020-04-17 | Stop reason: SDUPTHER

## 2019-10-01 ENCOUNTER — OFFICE VISIT (OUTPATIENT)
Dept: INTERNAL MEDICINE | Facility: CLINIC | Age: 74
End: 2019-10-01
Payer: MEDICARE

## 2019-10-01 ENCOUNTER — IMMUNIZATION (OUTPATIENT)
Dept: INTERNAL MEDICINE | Facility: CLINIC | Age: 74
End: 2019-10-01
Payer: MEDICARE

## 2019-10-01 ENCOUNTER — LAB VISIT (OUTPATIENT)
Dept: LAB | Facility: HOSPITAL | Age: 74
End: 2019-10-01
Attending: INTERNAL MEDICINE
Payer: MEDICARE

## 2019-10-01 VITALS
DIASTOLIC BLOOD PRESSURE: 88 MMHG | BODY MASS INDEX: 41.49 KG/M2 | WEIGHT: 313.06 LBS | OXYGEN SATURATION: 97 % | HEART RATE: 80 BPM | HEIGHT: 73 IN | SYSTOLIC BLOOD PRESSURE: 136 MMHG

## 2019-10-01 DIAGNOSIS — E11.9 TYPE 2 DIABETES MELLITUS WITHOUT COMPLICATION, WITHOUT LONG-TERM CURRENT USE OF INSULIN: ICD-10-CM

## 2019-10-01 DIAGNOSIS — K59.00 CONSTIPATION, UNSPECIFIED CONSTIPATION TYPE: ICD-10-CM

## 2019-10-01 DIAGNOSIS — K59.00 CONSTIPATION, UNSPECIFIED CONSTIPATION TYPE: Primary | ICD-10-CM

## 2019-10-01 LAB
BASOPHILS # BLD AUTO: 0.05 K/UL (ref 0–0.2)
BASOPHILS NFR BLD: 0.8 % (ref 0–1.9)
DIFFERENTIAL METHOD: ABNORMAL
EOSINOPHIL # BLD AUTO: 0.1 K/UL (ref 0–0.5)
EOSINOPHIL NFR BLD: 2.1 % (ref 0–8)
ERYTHROCYTE [DISTWIDTH] IN BLOOD BY AUTOMATED COUNT: 15.5 % (ref 11.5–14.5)
HCT VFR BLD AUTO: 50.5 % (ref 40–54)
HGB BLD-MCNC: 16 G/DL (ref 14–18)
IMM GRANULOCYTES # BLD AUTO: 0.01 K/UL (ref 0–0.04)
IMM GRANULOCYTES NFR BLD AUTO: 0.2 % (ref 0–0.5)
LYMPHOCYTES # BLD AUTO: 2.1 K/UL (ref 1–4.8)
LYMPHOCYTES NFR BLD: 31.3 % (ref 18–48)
MCH RBC QN AUTO: 28.5 PG (ref 27–31)
MCHC RBC AUTO-ENTMCNC: 31.7 G/DL (ref 32–36)
MCV RBC AUTO: 90 FL (ref 82–98)
MONOCYTES # BLD AUTO: 0.7 K/UL (ref 0.3–1)
MONOCYTES NFR BLD: 9.8 % (ref 4–15)
NEUTROPHILS # BLD AUTO: 3.7 K/UL (ref 1.8–7.7)
NEUTROPHILS NFR BLD: 55.8 % (ref 38–73)
NRBC BLD-RTO: 0 /100 WBC
PLATELET # BLD AUTO: 209 K/UL (ref 150–350)
PMV BLD AUTO: 10.2 FL (ref 9.2–12.9)
RBC # BLD AUTO: 5.61 M/UL (ref 4.6–6.2)
WBC # BLD AUTO: 6.65 K/UL (ref 3.9–12.7)

## 2019-10-01 PROCEDURE — 3075F PR MOST RECENT SYSTOLIC BLOOD PRESS GE 130-139MM HG: ICD-10-PCS | Mod: HCNC,CPTII,S$GLB, | Performed by: INTERNAL MEDICINE

## 2019-10-01 PROCEDURE — 3075F SYST BP GE 130 - 139MM HG: CPT | Mod: HCNC,CPTII,S$GLB, | Performed by: INTERNAL MEDICINE

## 2019-10-01 PROCEDURE — 3079F DIAST BP 80-89 MM HG: CPT | Mod: HCNC,CPTII,S$GLB, | Performed by: INTERNAL MEDICINE

## 2019-10-01 PROCEDURE — 99214 PR OFFICE/OUTPT VISIT, EST, LEVL IV, 30-39 MIN: ICD-10-PCS | Mod: HCNC,S$GLB,, | Performed by: INTERNAL MEDICINE

## 2019-10-01 PROCEDURE — 84443 ASSAY THYROID STIM HORMONE: CPT | Mod: HCNC

## 2019-10-01 PROCEDURE — 90662 FLU VACCINE - HIGH DOSE (65+) PRESERVATIVE FREE IM: ICD-10-PCS | Mod: HCNC,S$GLB,, | Performed by: INTERNAL MEDICINE

## 2019-10-01 PROCEDURE — 85025 COMPLETE CBC W/AUTO DIFF WBC: CPT | Mod: HCNC

## 2019-10-01 PROCEDURE — 3044F PR MOST RECENT HEMOGLOBIN A1C LEVEL <7.0%: ICD-10-PCS | Mod: HCNC,CPTII,S$GLB, | Performed by: INTERNAL MEDICINE

## 2019-10-01 PROCEDURE — 3079F PR MOST RECENT DIASTOLIC BLOOD PRESSURE 80-89 MM HG: ICD-10-PCS | Mod: HCNC,CPTII,S$GLB, | Performed by: INTERNAL MEDICINE

## 2019-10-01 PROCEDURE — 99499 UNLISTED E&M SERVICE: CPT | Mod: HCNC,S$GLB,, | Performed by: INTERNAL MEDICINE

## 2019-10-01 PROCEDURE — 99499 RISK ADDL DX/OHS AUDIT: ICD-10-PCS | Mod: HCNC,S$GLB,, | Performed by: INTERNAL MEDICINE

## 2019-10-01 PROCEDURE — 1101F PR PT FALLS ASSESS DOC 0-1 FALLS W/OUT INJ PAST YR: ICD-10-PCS | Mod: HCNC,CPTII,S$GLB, | Performed by: INTERNAL MEDICINE

## 2019-10-01 PROCEDURE — 99499 UNLISTED E&M SERVICE: CPT | Mod: HCNC,,, | Performed by: INTERNAL MEDICINE

## 2019-10-01 PROCEDURE — 1101F PT FALLS ASSESS-DOCD LE1/YR: CPT | Mod: HCNC,CPTII,S$GLB, | Performed by: INTERNAL MEDICINE

## 2019-10-01 PROCEDURE — 90662 IIV NO PRSV INCREASED AG IM: CPT | Mod: HCNC,S$GLB,, | Performed by: INTERNAL MEDICINE

## 2019-10-01 PROCEDURE — 99999 PR PBB SHADOW E&M-EST. PATIENT-LVL III: ICD-10-PCS | Mod: PBBFAC,HCNC,, | Performed by: INTERNAL MEDICINE

## 2019-10-01 PROCEDURE — 99499 RISK ADDL DX/OHS AUDIT: ICD-10-PCS | Mod: HCNC,,, | Performed by: INTERNAL MEDICINE

## 2019-10-01 PROCEDURE — G0008 FLU VACCINE - HIGH DOSE (65+) PRESERVATIVE FREE IM: ICD-10-PCS | Mod: HCNC,S$GLB,, | Performed by: INTERNAL MEDICINE

## 2019-10-01 PROCEDURE — 99214 OFFICE O/P EST MOD 30 MIN: CPT | Mod: HCNC,S$GLB,, | Performed by: INTERNAL MEDICINE

## 2019-10-01 PROCEDURE — G0008 ADMIN INFLUENZA VIRUS VAC: HCPCS | Mod: HCNC,S$GLB,, | Performed by: INTERNAL MEDICINE

## 2019-10-01 PROCEDURE — 99999 PR PBB SHADOW E&M-EST. PATIENT-LVL III: CPT | Mod: PBBFAC,HCNC,, | Performed by: INTERNAL MEDICINE

## 2019-10-01 PROCEDURE — 3044F HG A1C LEVEL LT 7.0%: CPT | Mod: HCNC,CPTII,S$GLB, | Performed by: INTERNAL MEDICINE

## 2019-10-01 PROCEDURE — 36415 COLL VENOUS BLD VENIPUNCTURE: CPT | Mod: HCNC

## 2019-10-01 NOTE — PROGRESS NOTES
PAST MEDICAL HISTORY:  Hypertension.  Hyperlipidemia.  Type 2 diabetes.  Complex sleep apnea with the use of CPAP  Dementia  Chronic lower extremity edema with venous insufficiency.  Morbid obesity.  Restless leg syndrome.  Right knee chondroplasty  Adenomatous colon polyp in 2002.  AAA, Endovascular stetnt     SOCIAL HISTORY:  Tobacco use, none.  Alcohol use, none.     MEDICATIONS:  Lisinopril HCT 20/25 mg.  Metformin 500 mg twice a day.  Pravastatin 20 mg a day.  Vitamin D.  Srfvium37 mg b.i.d        REASON FOR VISIT:  This is a 74-year-old male.  The reason why he made this   appointment is that he finds that he has constipation where sometimes he has to   strain to pass a bowel function.  Today, it happened and later he took castor   oil and had loose stools.  There was a time where he in one episode had left   lower quadrant pain, but he is not having any abdominal pain.  He was seen by me   on 8/21/2019 for loose stools.  It was recommended to start taking Metamucil   and he is taking two teaspoons a day and his stools are becoming formed, but for   the past two weeks he has reported constipation.  His wife does note that he   does not pass gas anymore like he did before.  There was a thought about this   being related to metformin.  On 8/22/2019, his basic metabolic profile was   normal, hemoglobin A1c 6.2.    REVIEW OF THE CHART:  A colonoscopy in March 2013 was normal.    PHYSICAL EXAMINATION:  VITAL SIGNS:  Weight is 313, pulse 80, blood pressure 130/72.  LUNGS:  Clear.  HEART:  Regular rate and rhythm.  ABDOMEN:  Active bowel sounds, soft and nontender.    IMPRESSION:  1. Constipation.  2. Type 2 diabetes.    PLAN:  Today while he is here, we will arrange for a CBC and a TSH and then   phone review to follow up.  He apparently drinks a lot of water and likes to be   active and now if he wants to take MiraLax, that is fine.        JAM/HN  dd: 10/01/2019 15:52:01 (CDT)  td: 10/01/2019 23:24:45 (CDT)  Doc ID    #[id number]  Job ID #[id number]    CC:

## 2019-10-02 LAB — TSH SERPL DL<=0.005 MIU/L-ACNC: 1.63 UIU/ML (ref 0.4–4)

## 2019-10-03 ENCOUNTER — TELEPHONE (OUTPATIENT)
Dept: INTERNAL MEDICINE | Facility: CLINIC | Age: 74
End: 2019-10-03

## 2019-10-03 NOTE — TELEPHONE ENCOUNTER
----- Message from Briana Velázquez sent at 10/3/2019  8:24 AM CDT -----  Contact: self   Patient's wife is calling about the bowel issues her  is having. Patient's wife states the patient would like to talk to the doctor. Please call and advise

## 2019-10-20 ENCOUNTER — PATIENT OUTREACH (OUTPATIENT)
Dept: ADMINISTRATIVE | Facility: OTHER | Age: 74
End: 2019-10-20

## 2019-11-21 ENCOUNTER — TELEPHONE (OUTPATIENT)
Dept: INTERNAL MEDICINE | Facility: CLINIC | Age: 74
End: 2019-11-21

## 2019-11-21 RX ORDER — TRAZODONE HYDROCHLORIDE 50 MG/1
50 TABLET ORAL NIGHTLY
Qty: 30 TABLET | Refills: 3 | Status: SHIPPED | OUTPATIENT
Start: 2019-11-21 | End: 2020-01-28

## 2019-11-21 NOTE — TELEPHONE ENCOUNTER
Pt wife states  is not sleeping at night they stop caffeine, melatonin is not working he is walking like a zombie she states. She want to know if something can be sent to pharmacy to help him sleep

## 2019-11-21 NOTE — TELEPHONE ENCOUNTER
----- Message from Telma Robbins sent at 11/21/2019  2:37 PM CST -----  Contact: [phone]  Patient is requesting a call from the office in regards to melatonin that hes trying to take for sleep. He stated this medication is not helping him. He is not sleeping at all.  Please advise, thanks .

## 2019-11-23 ENCOUNTER — HOSPITAL ENCOUNTER (EMERGENCY)
Facility: HOSPITAL | Age: 74
Discharge: HOME | End: 2019-11-23
Attending: EMERGENCY MEDICINE
Payer: MEDICARE

## 2019-11-23 VITALS
DIASTOLIC BLOOD PRESSURE: 71 MMHG | OXYGEN SATURATION: 97 % | SYSTOLIC BLOOD PRESSURE: 158 MMHG | BODY MASS INDEX: 39.78 KG/M2 | WEIGHT: 310 LBS | HEART RATE: 62 BPM | TEMPERATURE: 98 F | RESPIRATION RATE: 16 BRPM | HEIGHT: 74 IN

## 2019-11-23 DIAGNOSIS — R06.02 SOB (SHORTNESS OF BREATH): ICD-10-CM

## 2019-11-23 DIAGNOSIS — M54.2 NECK PAIN: ICD-10-CM

## 2019-11-23 DIAGNOSIS — J02.9 SORE THROAT: Primary | ICD-10-CM

## 2019-11-23 LAB
ALBUMIN SERPL BCP-MCNC: 3.4 G/DL (ref 3.5–5.2)
ALP SERPL-CCNC: 87 U/L (ref 55–135)
ALT SERPL W/O P-5'-P-CCNC: 36 U/L (ref 10–44)
ANION GAP SERPL CALC-SCNC: 9 MMOL/L (ref 8–16)
AST SERPL-CCNC: 20 U/L (ref 10–40)
BASOPHILS # BLD AUTO: 0.04 K/UL (ref 0–0.2)
BASOPHILS NFR BLD: 0.6 % (ref 0–1.9)
BILIRUB SERPL-MCNC: 0.3 MG/DL (ref 0.1–1)
BNP SERPL-MCNC: 72 PG/ML (ref 0–99)
BUN SERPL-MCNC: 17 MG/DL (ref 8–23)
CALCIUM SERPL-MCNC: 9.2 MG/DL (ref 8.7–10.5)
CHLORIDE SERPL-SCNC: 109 MMOL/L (ref 95–110)
CO2 SERPL-SCNC: 21 MMOL/L (ref 23–29)
CREAT SERPL-MCNC: 1 MG/DL (ref 0.5–1.4)
DIFFERENTIAL METHOD: ABNORMAL
EOSINOPHIL # BLD AUTO: 0.1 K/UL (ref 0–0.5)
EOSINOPHIL NFR BLD: 1.2 % (ref 0–8)
ERYTHROCYTE [DISTWIDTH] IN BLOOD BY AUTOMATED COUNT: 15.7 % (ref 11.5–14.5)
EST. GFR  (AFRICAN AMERICAN): >60 ML/MIN/1.73 M^2
EST. GFR  (NON AFRICAN AMERICAN): >60 ML/MIN/1.73 M^2
GLUCOSE SERPL-MCNC: 147 MG/DL (ref 70–110)
HCT VFR BLD AUTO: 50 % (ref 40–54)
HGB BLD-MCNC: 15.8 G/DL (ref 14–18)
IMM GRANULOCYTES # BLD AUTO: 0.02 K/UL (ref 0–0.04)
IMM GRANULOCYTES NFR BLD AUTO: 0.3 % (ref 0–0.5)
LYMPHOCYTES # BLD AUTO: 1.6 K/UL (ref 1–4.8)
LYMPHOCYTES NFR BLD: 24.2 % (ref 18–48)
MCH RBC QN AUTO: 28.4 PG (ref 27–31)
MCHC RBC AUTO-ENTMCNC: 31.6 G/DL (ref 32–36)
MCV RBC AUTO: 90 FL (ref 82–98)
MONOCYTES # BLD AUTO: 0.5 K/UL (ref 0.3–1)
MONOCYTES NFR BLD: 7.7 % (ref 4–15)
NEUTROPHILS # BLD AUTO: 4.3 K/UL (ref 1.8–7.7)
NEUTROPHILS NFR BLD: 66 % (ref 38–73)
NRBC BLD-RTO: 0 /100 WBC
PLATELET # BLD AUTO: 199 K/UL (ref 150–350)
PMV BLD AUTO: 9.4 FL (ref 9.2–12.9)
POTASSIUM SERPL-SCNC: 4.7 MMOL/L (ref 3.5–5.1)
PROT SERPL-MCNC: 6.6 G/DL (ref 6–8.4)
RBC # BLD AUTO: 5.57 M/UL (ref 4.6–6.2)
SODIUM SERPL-SCNC: 139 MMOL/L (ref 136–145)
TROPONIN I SERPL DL<=0.01 NG/ML-MCNC: <0.006 NG/ML (ref 0–0.03)
WBC # BLD AUTO: 6.53 K/UL (ref 3.9–12.7)

## 2019-11-23 PROCEDURE — 83880 ASSAY OF NATRIURETIC PEPTIDE: CPT | Mod: HCNC

## 2019-11-23 PROCEDURE — 93010 EKG 12-LEAD: ICD-10-PCS | Mod: HCNC,,, | Performed by: INTERNAL MEDICINE

## 2019-11-23 PROCEDURE — 99284 PR EMERGENCY DEPT VISIT,LEVEL IV: ICD-10-PCS | Mod: ,,, | Performed by: EMERGENCY MEDICINE

## 2019-11-23 PROCEDURE — 85025 COMPLETE CBC W/AUTO DIFF WBC: CPT | Mod: HCNC

## 2019-11-23 PROCEDURE — 99285 EMERGENCY DEPT VISIT HI MDM: CPT | Mod: 25,HCNC

## 2019-11-23 PROCEDURE — 84484 ASSAY OF TROPONIN QUANT: CPT | Mod: HCNC

## 2019-11-23 PROCEDURE — 93010 ELECTROCARDIOGRAM REPORT: CPT | Mod: HCNC,,, | Performed by: INTERNAL MEDICINE

## 2019-11-23 PROCEDURE — 80053 COMPREHEN METABOLIC PANEL: CPT | Mod: HCNC

## 2019-11-23 PROCEDURE — 99284 EMERGENCY DEPT VISIT MOD MDM: CPT | Mod: ,,, | Performed by: EMERGENCY MEDICINE

## 2019-11-23 PROCEDURE — 93005 ELECTROCARDIOGRAM TRACING: CPT | Mod: HCNC

## 2019-11-23 NOTE — DISCHARGE INSTRUCTIONS
Tests today showed:   Labs Reviewed   CBC W/ AUTO DIFFERENTIAL - Abnormal; Notable for the following components:       Result Value    Mean Corpuscular Hemoglobin Conc 31.6 (*)     RDW 15.7 (*)     All other components within normal limits   COMPREHENSIVE METABOLIC PANEL - Abnormal; Notable for the following components:    CO2 21 (*)     Glucose 147 (*)     Albumin 3.4 (*)     All other components within normal limits   TROPONIN I   B-TYPE NATRIURETIC PEPTIDE     Imaging Results              X-Ray Neck Soft Tissue (Final result)  Result time 11/23/19 03:08:42      Final result by Joseph Issa MD (11/23/19 03:08:42)                   Impression:      No retropharyngeal soft tissue thickening.    No enlargement of the epiglottis or thickening of the aryepiglottic folds.      Electronically signed by: Joseph Issa MD  Date:    11/23/2019  Time:    03:08               Narrative:    EXAMINATION:  XR NECK SOFT TISSUE    CLINICAL HISTORY:  Cervicalgia    TECHNIQUE:  AP and lateral soft tissue views the neck were performed.    COMPARISON:  None.    FINDINGS:  No retropharyngeal soft tissue thickening.  No enlargement of the epiglottis or thickening of the aryepiglottic folds.  Cervical airway is midline and appears patent.  No radiopaque foreign body seen.                                       X-Ray Chest PA And Lateral (Final result)  Result time 11/23/19 03:15:37      Final result by Joseph Issa MD (11/23/19 03:15:37)                   Impression:      No acute cardiopulmonary process.    Findings suggest prior granulomatous disease, unchanged.      Electronically signed by: Joseph Issa MD  Date:    11/23/2019  Time:    03:15               Narrative:    EXAMINATION:  XR CHEST PA AND LATERAL    CLINICAL HISTORY:  Chest Pain;    TECHNIQUE:  PA and lateral views of the chest were performed.    COMPARISON:  March 1, 2017.    FINDINGS:  Calcified granuloma left upper lobe and calcified left hilar nodes  appear unchanged.  Additional small calcified granulomas right lower lung field, unchanged.    There is no consolidation, effusion, or pneumothorax.    Cardiomediastinal silhouette is stable.    Regional osseous structures are unchanged.                                    Treatments you had today:   Medications - No data to display    Follow-Up Plan:  - Follow-up with primary care doctor within 3 - 5 days  - Additional testing and/or evaluation as directed by your primary doctor    Return to the Emergency Department for symptoms including but not limited to: worsening symptoms, shortness of breath or chest pain, vomiting with inability to hold down fluids, fevers greater than 100.4°F, passing out/fainting/unconsciousness, or other concerning symptoms.

## 2019-11-24 NOTE — ED PROVIDER NOTES
Source of History:  Wife, patient    Chief complaint:  Shortness of Breath (noticed tonight he felt like he was not breathing properly, and that he feels like something is stuck in the back of his throat. )    HPI:  Jake Louie is a 74 y.o. male with history of early-onset Alzheimer's dementia, obesity, sleep apnea, hypertension, vertigo presenting to emergency department with complaint of a feeling like something is stuck in the back of his throat.  Patient's wife states that throughout the day today patient was in his normal state of health, aside from a 30 min episode of his vertigo.  He had dizziness that felt like room spinning and nausea and sweating, which resolved with the Epley maneuver which the wife with performing, and was exactly similar to his previous vertigo episodes.  There was no chest pain or shortness of breath.    Patient took his evening trazodone around 11:00 p.m..  About 30 min later, he woke up stating that he felt like there was a tickle in the back of his throat or something was stuck back there.  At no point has he had shortness of breath, despite triage notes. He has no difficulty swallowing, is tolerating secretions, does not have voice changes or trismus, no neck swelling, no fevers chills or cough.    Patient did not have any difficulty swallowing the pill when he initially took it.  No other complaints at this time.    ROS: As per HPI and below:  Review of Systems   Constitutional: Negative for fever.   HENT: Positive for sore throat.    Eyes: Negative for double vision.   Respiratory: Negative for cough, shortness of breath and stridor.    Cardiovascular: Negative for chest pain.   Gastrointestinal: Negative for abdominal pain and vomiting.   Genitourinary: Negative for dysuria.   Musculoskeletal: Negative for falls.   Skin: Negative for rash.   Neurological: Positive for dizziness. Negative for headaches.     Review of patient's allergies indicates:  No Known Allergies    No  current facility-administered medications on file prior to encounter.      Current Outpatient Medications on File Prior to Encounter   Medication Sig Dispense Refill    ascorbic acid (VITAMIN C) 500 MG tablet Take 500 mg by mouth once daily.      diclofenac (VOLTAREN) 75 MG EC tablet TAKE 1 TABLET BY MOUTH TWICE DAILY 180 tablet 0    influenza (FLUZONE HIGH-DOSE) 180 mcg/0.5 mL vaccine Inject 0.5ml into the muscle. 0.5 mL 0    lisinopril-hydrochlorothiazide (PRINZIDE,ZESTORETIC) 20-25 mg Tab TAKE 1 TABLET EVERY DAY 90 tablet 3    memantine (NAMENDA) 10 MG Tab Take 1 tablet (10 mg total) by mouth 2 (two) times daily. 60 tablet 5    metFORMIN (GLUCOPHAGE) 500 MG tablet TAKE 1 TABLET TWICE DAILY  WITH  MEALS 180 tablet 3    multivitamin (THERAGRAN) per tablet Take 1 tablet by mouth once daily.        mupirocin (BACTROBAN) 2 % ointment AAA bid 30 g 2    mupirocin (BACTROBAN) 2 % ointment APPLY TOPICALLY TWO TIMES A DAY. 44 g 0    pravastatin (PRAVACHOL) 20 MG tablet TAKE 1 TABLET EVERY DAY 90 tablet 3    traZODone (DESYREL) 50 MG tablet Take 1 tablet (50 mg total) by mouth every evening. 30 tablet 3    vitamin D 1000 units Tab Take 185 mg by mouth once daily.         PMH:  As per HPI and below:  Past Medical History:   Diagnosis Date    Depression     HEARING LOSS     Hyperlipidemia     Hypertension     JUICE (obstructive sleep apnea)     Personal history of colonic polyps     Restless legs syndrome (RLS)     Sleep apnea      Past Surgical History:   Procedure Laterality Date    KNEE SURGERY      KNEE SURGERY      leg fx  repair       Social History     Socioeconomic History    Marital status:      Spouse name: Not on file    Number of children: Not on file    Years of education: Not on file    Highest education level: Not on file   Occupational History     Employer: OTHER   Social Needs    Financial resource strain: Not on file    Food insecurity:     Worry: Not on file     Inability:  Not on file    Transportation needs:     Medical: Not on file     Non-medical: Not on file   Tobacco Use    Smoking status: Current Every Day Smoker     Packs/day: 0.50     Years: 50.00     Pack years: 25.00    Smokeless tobacco: Never Used   Substance and Sexual Activity    Alcohol use: No    Drug use: No    Sexual activity: Not on file   Lifestyle    Physical activity:     Days per week: Not on file     Minutes per session: Not on file    Stress: Not on file   Relationships    Social connections:     Talks on phone: Not on file     Gets together: Not on file     Attends Evangelical service: Not on file     Active member of club or organization: Not on file     Attends meetings of clubs or organizations: Not on file     Relationship status: Not on file   Other Topics Concern    Not on file   Social History Narrative    Not on file       Family History   Problem Relation Age of Onset    Arthritis Mother     Hypertension Mother     Hearing loss Mother     Pneumonia Mother     Parkinsonism Father     Hypertension Brother     Colon cancer Brother     Hypertension Brother     Hypertension Brother        Physical Exam:    Vitals:    11/23/19 0439   BP:    Pulse: 62   Resp: 16   Temp:      Gen: No acute distress.  Mental Status:  Alert and oriented x 1.  Follows commands with difficulty.  Confused at times, at his baseline per wife.  Skin: Warm, dry. No rashes seen.  Eyes: No conjunctival injection.  ENT: Oropharynx clear.  No posterior erythema, no cobblestoning, no tonsillar exudate or asymmetry.  No foreign body noted.  Direct laryngoscopy was performed, no foreign body was noted.  Pulm: Clear to auscultation bilaterally.  Good air movement.  No wheezes. No increased work of breathing.  No stridor.  CV: Regular rate. Regular rhythm. Normal peripheral perfusion. No lower extremity edema.  Abd: Soft.  Not distended.  Nontender.  No guarding.  No rebound.  MSK: Good range of motion all joints.  No  deformities.    Neck supple.  No meningismus.  Neuro: Awake. Speech normal. No focal neuro deficit observed. Cranial nerves intact. Motor grossly intact.  Sensation grossly intact.  Cerebellar intact.      Laboratory Studies:  Labs Reviewed   CBC W/ AUTO DIFFERENTIAL - Abnormal; Notable for the following components:       Result Value    Mean Corpuscular Hemoglobin Conc 31.6 (*)     RDW 15.7 (*)     All other components within normal limits   COMPREHENSIVE METABOLIC PANEL - Abnormal; Notable for the following components:    CO2 21 (*)     Glucose 147 (*)     Albumin 3.4 (*)     All other components within normal limits   TROPONIN I   B-TYPE NATRIURETIC PEPTIDE     EKG (independently interpreted by me):  Sinus bradycardia, rate 59.  No ST elevation or depression, no T-wave inversion.   milliseconds,  milliseconds.    X-rays (independently interpreted by me):  No foreign body, no airway narrowing, no acute abnormality noted    Chart reviewed.     Imaging Results          X-Ray Neck Soft Tissue (Final result)  Result time 11/23/19 03:08:42    Final result by Joseph Issa MD (11/23/19 03:08:42)                 Impression:      No retropharyngeal soft tissue thickening.    No enlargement of the epiglottis or thickening of the aryepiglottic folds.      Electronically signed by: Joseph Issa MD  Date:    11/23/2019  Time:    03:08             Narrative:    EXAMINATION:  XR NECK SOFT TISSUE    CLINICAL HISTORY:  Cervicalgia    TECHNIQUE:  AP and lateral soft tissue views the neck were performed.    COMPARISON:  None.    FINDINGS:  No retropharyngeal soft tissue thickening.  No enlargement of the epiglottis or thickening of the aryepiglottic folds.  Cervical airway is midline and appears patent.  No radiopaque foreign body seen.                               X-Ray Chest PA And Lateral (Final result)  Result time 11/23/19 03:15:37    Final result by Joseph Issa MD (11/23/19 03:15:37)                  Impression:      No acute cardiopulmonary process.    Findings suggest prior granulomatous disease, unchanged.      Electronically signed by: Joseph Issa MD  Date:    11/23/2019  Time:    03:15             Narrative:    EXAMINATION:  XR CHEST PA AND LATERAL    CLINICAL HISTORY:  Chest Pain;    TECHNIQUE:  PA and lateral views of the chest were performed.    COMPARISON:  March 1, 2017.    FINDINGS:  Calcified granuloma left upper lobe and calcified left hilar nodes appear unchanged.  Additional small calcified granulomas right lower lung field, unchanged.    There is no consolidation, effusion, or pneumothorax.    Cardiomediastinal silhouette is stable.    Regional osseous structures are unchanged.                              MDM:    74 y.o. male with complaint of tickle in the back of his throat or feeling like something might be stuck back there after taking a pill around 11:00 p.m..  He is afebrile, stable, nontoxic.  No signs of airway compromise including stridor, dysphagia, drooling, trismus, and has a normal posterior oropharynx including with direct laryngoscopy which was performed at bedside.  Labs which were ordered from triage are unremarkable, chest x-ray and neck x-ray are likewise unremarkable.  I am not suspicious for deep space neck infection.  I am not suspicious for pill esophagitis is the typical is essentially in the posterior oropharynx and not lower down.  Patient was able to tolerate p.o. in the emergency department without issue and actually feels better.    Return precautions were discussed at bedside with wife.    Diagnostic Impression:    1. Sore throat    2. SOB (shortness of breath)    3. Neck pain      Patient and/or family understands the plan and is in agreement, verbalized understanding, questions answered    Lissy Garcia MD  Emergency Medicine           Lissy Garcia MD  11/23/19 [id number]

## 2019-12-05 ENCOUNTER — PATIENT MESSAGE (OUTPATIENT)
Dept: INTERNAL MEDICINE | Facility: CLINIC | Age: 74
End: 2019-12-05

## 2019-12-21 ENCOUNTER — PATIENT MESSAGE (OUTPATIENT)
Dept: DERMATOLOGY | Facility: CLINIC | Age: 74
End: 2019-12-21

## 2019-12-26 ENCOUNTER — HOSPITAL ENCOUNTER (EMERGENCY)
Facility: HOSPITAL | Age: 74
Discharge: HOME | End: 2019-12-26
Attending: EMERGENCY MEDICINE
Payer: MEDICARE

## 2019-12-26 VITALS
TEMPERATURE: 99 F | OXYGEN SATURATION: 96 % | DIASTOLIC BLOOD PRESSURE: 79 MMHG | HEIGHT: 68 IN | WEIGHT: 300 LBS | SYSTOLIC BLOOD PRESSURE: 169 MMHG | RESPIRATION RATE: 16 BRPM | HEART RATE: 86 BPM | BODY MASS INDEX: 45.47 KG/M2

## 2019-12-26 DIAGNOSIS — R41.82 ALTERED MENTAL STATUS: ICD-10-CM

## 2019-12-26 DIAGNOSIS — F03.91 DEMENTIA WITH BEHAVIORAL DISTURBANCE, UNSPECIFIED DEMENTIA TYPE: Primary | ICD-10-CM

## 2019-12-26 LAB
ALBUMIN SERPL BCP-MCNC: 3.8 G/DL (ref 3.5–5.2)
ALP SERPL-CCNC: 90 U/L (ref 55–135)
ALT SERPL W/O P-5'-P-CCNC: 45 U/L (ref 10–44)
ANION GAP SERPL CALC-SCNC: 10 MMOL/L (ref 8–16)
APAP SERPL-MCNC: <3 UG/ML (ref 10–20)
AST SERPL-CCNC: 19 U/L (ref 10–40)
BASOPHILS # BLD AUTO: 0.04 K/UL (ref 0–0.2)
BASOPHILS NFR BLD: 0.5 % (ref 0–1.9)
BILIRUB SERPL-MCNC: 0.4 MG/DL (ref 0.1–1)
BUN SERPL-MCNC: 22 MG/DL (ref 8–23)
CALCIUM SERPL-MCNC: 9.6 MG/DL (ref 8.7–10.5)
CHLORIDE SERPL-SCNC: 105 MMOL/L (ref 95–110)
CO2 SERPL-SCNC: 25 MMOL/L (ref 23–29)
CREAT SERPL-MCNC: 1.2 MG/DL (ref 0.5–1.4)
DIFFERENTIAL METHOD: ABNORMAL
EOSINOPHIL # BLD AUTO: 0.1 K/UL (ref 0–0.5)
EOSINOPHIL NFR BLD: 1.1 % (ref 0–8)
ERYTHROCYTE [DISTWIDTH] IN BLOOD BY AUTOMATED COUNT: 15.1 % (ref 11.5–14.5)
EST. GFR  (AFRICAN AMERICAN): >60 ML/MIN/1.73 M^2
EST. GFR  (NON AFRICAN AMERICAN): 60 ML/MIN/1.73 M^2
ETHANOL SERPL-MCNC: <10 MG/DL
GLUCOSE SERPL-MCNC: 152 MG/DL (ref 70–110)
HCT VFR BLD AUTO: 50 % (ref 40–54)
HGB BLD-MCNC: 16.5 G/DL (ref 14–18)
IMM GRANULOCYTES # BLD AUTO: 0.03 K/UL (ref 0–0.04)
IMM GRANULOCYTES NFR BLD AUTO: 0.4 % (ref 0–0.5)
LYMPHOCYTES # BLD AUTO: 1.7 K/UL (ref 1–4.8)
LYMPHOCYTES NFR BLD: 21 % (ref 18–48)
MCH RBC QN AUTO: 28.6 PG (ref 27–31)
MCHC RBC AUTO-ENTMCNC: 33 G/DL (ref 32–36)
MCV RBC AUTO: 87 FL (ref 82–98)
MONOCYTES # BLD AUTO: 0.8 K/UL (ref 0.3–1)
MONOCYTES NFR BLD: 9.9 % (ref 4–15)
NEUTROPHILS # BLD AUTO: 5.5 K/UL (ref 1.8–7.7)
NEUTROPHILS NFR BLD: 67.1 % (ref 38–73)
NRBC BLD-RTO: 0 /100 WBC
PLATELET # BLD AUTO: 228 K/UL (ref 150–350)
PMV BLD AUTO: 9.5 FL (ref 9.2–12.9)
POTASSIUM SERPL-SCNC: 3.8 MMOL/L (ref 3.5–5.1)
PROT SERPL-MCNC: 7 G/DL (ref 6–8.4)
RBC # BLD AUTO: 5.77 M/UL (ref 4.6–6.2)
SALICYLATES SERPL-MCNC: <5 MG/DL (ref 15–30)
SODIUM SERPL-SCNC: 140 MMOL/L (ref 136–145)
TSH SERPL DL<=0.005 MIU/L-ACNC: 2.78 UIU/ML (ref 0.4–4)
WBC # BLD AUTO: 8.15 K/UL (ref 3.9–12.7)

## 2019-12-26 PROCEDURE — 80320 DRUG SCREEN QUANTALCOHOLS: CPT | Mod: HCNC

## 2019-12-26 PROCEDURE — 99284 EMERGENCY DEPT VISIT MOD MDM: CPT | Mod: 25,HCNC

## 2019-12-26 PROCEDURE — 85025 COMPLETE CBC W/AUTO DIFF WBC: CPT | Mod: HCNC

## 2019-12-26 PROCEDURE — 80329 ANALGESICS NON-OPIOID 1 OR 2: CPT | Mod: HCNC

## 2019-12-26 PROCEDURE — 80053 COMPREHEN METABOLIC PANEL: CPT | Mod: HCNC

## 2019-12-26 PROCEDURE — 84443 ASSAY THYROID STIM HORMONE: CPT | Mod: HCNC

## 2019-12-26 PROCEDURE — 80307 DRUG TEST PRSMV CHEM ANLYZR: CPT | Mod: HCNC

## 2019-12-26 NOTE — ED NOTES
Patient's wife now at bedside. Per wife, pt left yesterday to drive [place]. She called him several hours later without him answering the phone. Wife states pt has early onset dementia.

## 2019-12-26 NOTE — ED TRIAGE NOTES
Pt presents to ED via EMS with c/o altered mental status. Per EMS, pt drove to gas station and asked a bystander to help him pump gas. They went to help him and after some talking, felt pt was confused and had no idea where he was or how he got there. Police was called who further called EMS. EMS states they were able to get in touch with pt's wife who states he gets like this sometimes after being out all day. When asked why patient is here, he states he's probably here for a check up. He is oriented to self, knows he is at an [facility] facility, not oriented to the date but knows the president. Pt denies any pain or any complaints and is calm and cooperative at this time.

## 2019-12-27 NOTE — ED PROVIDER NOTES
Encounter Date: 12/26/2019       History     Chief Complaint   Patient presents with    Altered Mental Status     Pt found at gas station tonight; pt went to get gas and asked someone to help him pump gas, they started talking and bystander realized pt was not making sense and appeared confused, so EMS called     74 y.o. Male presents via [facility] having been found confused at a gas station in [place]. Patient was able to give his wife's name and number to paramedics who called her; she told them that she was coming to the hospital.    Later, when wife arrived, she explained that patient has dementia, and he called her earlier in the day to say he was on the way to a relative's house, but then disappeared until now. Patient's mental state has been declining recently.         Review of patient's allergies indicates:  No Known Allergies  Past Medical History:   Diagnosis Date    Depression     HEARING LOSS     Hyperlipidemia     Hypertension     JUICE (obstructive sleep apnea)     Personal history of colonic polyps     Restless legs syndrome (RLS)     Sleep apnea      Past Surgical History:   Procedure Laterality Date    KNEE SURGERY      KNEE SURGERY      leg fx  repair     Family History   Problem Relation Age of Onset    Arthritis Mother     Hypertension Mother     Hearing loss Mother     Pneumonia Mother     Parkinsonism Father     Hypertension Brother     Colon cancer Brother     Hypertension Brother     Hypertension Brother      Social History     Tobacco Use    Smoking status: Current Every Day Smoker     Packs/day: 0.50     Years: 50.00     Pack years: 25.00    Smokeless tobacco: Never Used   Substance Use Topics    Alcohol use: No    Drug use: No     Review of Systems   Unable to perform ROS: Mental status change   HENT: Positive for hearing loss.        Physical Exam     Initial Vitals [12/26/19 0414]   BP Pulse Resp Temp SpO2   (!) 172/93 93 18 99.1 °F (37.3 °C) 95 %       MAP       --         Physical Exam    Nursing note and vitals reviewed.  Constitutional: He appears well-developed and well-nourished. He is not diaphoretic.   Awake, alert, nontoxic, obese. Initially pleasant, then increasingly irritable.    HENT:   Head: Normocephalic and atraumatic.   Eyes: Conjunctivae and EOM are normal. Pupils are equal, round, and reactive to light.   Neck: Normal range of motion. Neck supple.   Cardiovascular: Normal rate, regular rhythm and intact distal pulses.   Pulmonary/Chest: Breath sounds normal. No respiratory distress. He has no wheezes. He has no rhonchi. He has no rales.   Abdominal: Soft. There is no tenderness.   Musculoskeletal: Normal range of motion. He exhibits no edema (2+ bilat LE) or tenderness.   Neurological: He is alert and oriented to person, place, and time. He has normal strength.   Moving all extremities   Skin: Skin is warm and dry.   Psychiatric:   Initially pleasant, then increasingly irritable.         ED Course   Procedures  Labs Reviewed   CBC W/ AUTO DIFFERENTIAL - Abnormal; Notable for the following components:       Result Value    RDW 15.1 (*)     All other components within normal limits   COMPREHENSIVE METABOLIC PANEL - Abnormal; Notable for the following components:    Glucose 152 (*)     ALT 45 (*)     All other components within normal limits   ACETAMINOPHEN LEVEL - Abnormal; Notable for the following components:    Acetaminophen (Tylenol), Serum <3.0 (*)     All other components within normal limits   SALICYLATE LEVEL - Abnormal; Notable for the following components:    Salicylate Lvl <5.0 (*)     All other components within normal limits   TSH   ALCOHOL,MEDICAL (ETHANOL)          Imaging Results          CT Head Without Contrast (Final result)  Result time 12/26/19 05:44:20    Final result by Murray Tran MD (12/26/19 05:44:20)                 Impression:      No CT evidence of acute intracranial abnormality. Clinical correlation and  further evaluation as warranted.    Generalized cerebral volume loss.  Remote left cerebellar infarct.      Electronically signed by: Murray Tran MD  Date:    12/26/2019  Time:    05:44             Narrative:    EXAMINATION:  CT HEAD WITHOUT CONTRAST    CLINICAL HISTORY:  Confusion/delirium, altered LOC, unexplained;    TECHNIQUE:  Low dose axial images were obtained through the head.  Coronal and sagittal reformations were also performed. Contrast was not administered.    COMPARISON:  MRI brain 08/01/2018    FINDINGS:  There is generalized cerebral volume loss with compensatory sulcal widening and ventricular enlargement.  There is a remote left cerebellar hemisphere infarct present.  Ventricular system appears relatively stable in size and configuration compared to prior MRI examination.  There is no evidence of acute intracranial hemorrhage or midline shift.  No extra-axial fluid collections identified.  The basal cisterns are patent. There is mild mucosal thickening of the anterior ethmoid air cells.  The remaining paranasal sinuses appear relatively well aerated.  The visualized bones of the calvarium demonstrate no acute osseous abnormality.                                 Medical Decision Making:   History:   Old Medical Records: I decided to obtain old medical records.  Old Records Summarized: records from previous admission(s) and records from clinic visits.  Initial Assessment:   74 y.o. Male presents via EMS with AMS, found at gas station.  Differential Diagnosis:   Ddx includes CVA, TIA, occult infection, dementia, other.  Clinical Tests:   Lab Tests: Ordered and Reviewed  Radiological Study: Reviewed and Ordered  ED Management:  CT head NAD.    Labs reassuring.    Patient's wife arrived prior to remainder of workup, but she assures me that this behavior has occurred before. I have encouraged her to seek f/u to gerontologist and to attempt to keep patient from driving, as this is not safe for him or  for other people on the road.    D/c'ed with wife.                                  Clinical Impression:       ICD-10-CM ICD-9-CM   1. Dementia with behavioral disturbance, unspecified dementia type F03.91 294.21   2. Altered mental status R41.82 780.97                             Telma Bird MD  12/27/19 1505

## 2020-01-14 ENCOUNTER — TELEPHONE (OUTPATIENT)
Dept: NEUROLOGY | Facility: CLINIC | Age: 75
End: 2020-01-14

## 2020-01-14 NOTE — TELEPHONE ENCOUNTER
----- Message from Anaid Steinberg sent at 12/27/2019  1:10 PM CST -----  Contact: pt [phone]  Pt called in to schedule an appt for follow up. Pt has been experiencing paranoia and hallucinations.

## 2020-01-14 NOTE — TELEPHONE ENCOUNTER
Left a message for the patients wife to let her know an appointment is scheduled for him on 2/13 at 10:30. She was asked to call back to reschedule if this is not a good day and time for him.

## 2020-01-15 ENCOUNTER — TELEPHONE (OUTPATIENT)
Dept: NEUROLOGY | Facility: CLINIC | Age: 75
End: 2020-01-15

## 2020-01-15 NOTE — TELEPHONE ENCOUNTER
----- Message from Della Mesa sent at 1/14/2020  2:12 PM CST -----  Contact: PTs Wife  Wife called regarding future appointment with Dr. Hannah on 2/13 - she's asking if it's possible to get it moved to an afternoon spot instead?    Would like the 1pm slot please    Callback: [phone]

## 2020-01-15 NOTE — TELEPHONE ENCOUNTER
----- Message from Della Mesa sent at 1/14/2020  2:12 PM CST -----  Contact: PTs Wife  Wife called regarding future appointment with Dr. Hannah on 2/13 - she's asking if it's possible to get it moved to an afternoon spot instead?    Would like the 1pm slot please    Callback: [phone]

## 2020-01-20 ENCOUNTER — TELEPHONE (OUTPATIENT)
Dept: SLEEP MEDICINE | Facility: CLINIC | Age: 75
End: 2020-01-20

## 2020-01-20 NOTE — TELEPHONE ENCOUNTER
----- Message from Leny Kendrick sent at 1/20/2020  4:33 PM CST -----  Contact: sha Wife   Name of Who is Calling: REX REID [[id number]] sha Wife      What is the request in detail:REX REID [[id number]] is requesting a call back in regards to becoming a new Patient .. Patient is  A former Patient    Please contact to further discuss and advise      Can the clinic reply by [facility]:  Yes     [facility]:  sha Wife  [phone]

## 2020-01-20 NOTE — TELEPHONE ENCOUNTER
----- Message from Leny Kendrick sent at 1/20/2020  4:33 PM CST -----  Contact: sha Wife   Name of Who is Calling: REX REID [[id number]] sha Wife      What is the request in detail:REX REID [[id number]] is requesting a call back in regards to becoming a new Patient .. Patient is  A former Patient    Please contact to further discuss and advise      Can the clinic reply by [facility]:  Yes     [facility]  sha Wife  [phone]

## 2020-01-28 ENCOUNTER — OFFICE VISIT (OUTPATIENT)
Dept: INTERNAL MEDICINE | Facility: CLINIC | Age: 75
End: 2020-01-28
Payer: MEDICARE

## 2020-01-28 VITALS
WEIGHT: 312 LBS | SYSTOLIC BLOOD PRESSURE: 134 MMHG | BODY MASS INDEX: 47.29 KG/M2 | HEART RATE: 80 BPM | OXYGEN SATURATION: 98 % | DIASTOLIC BLOOD PRESSURE: 86 MMHG | HEIGHT: 68 IN

## 2020-01-28 DIAGNOSIS — L03.116 CELLULITIS OF LEFT LOWER EXTREMITY: Primary | ICD-10-CM

## 2020-01-28 DIAGNOSIS — L72.9 CUTANEOUS CYST: ICD-10-CM

## 2020-01-28 DIAGNOSIS — T14.8XXA ABRASION: ICD-10-CM

## 2020-01-28 PROCEDURE — 1101F PR PT FALLS ASSESS DOC 0-1 FALLS W/OUT INJ PAST YR: ICD-10-PCS | Mod: HCNC,CPTII,S$GLB, | Performed by: INTERNAL MEDICINE

## 2020-01-28 PROCEDURE — 3075F SYST BP GE 130 - 139MM HG: CPT | Mod: HCNC,CPTII,S$GLB, | Performed by: INTERNAL MEDICINE

## 2020-01-28 PROCEDURE — 90471 TDAP VACCINE GREATER THAN OR EQUAL TO 7YO IM: ICD-10-PCS | Mod: HCNC,S$GLB,, | Performed by: INTERNAL MEDICINE

## 2020-01-28 PROCEDURE — 90715 TDAP VACCINE 7 YRS/> IM: CPT | Mod: HCNC,S$GLB,, | Performed by: INTERNAL MEDICINE

## 2020-01-28 PROCEDURE — 99214 PR OFFICE/OUTPT VISIT, EST, LEVL IV, 30-39 MIN: ICD-10-PCS | Mod: 25,HCNC,S$GLB, | Performed by: INTERNAL MEDICINE

## 2020-01-28 PROCEDURE — 90471 IMMUNIZATION ADMIN: CPT | Mod: HCNC,S$GLB,, | Performed by: INTERNAL MEDICINE

## 2020-01-28 PROCEDURE — 1126F AMNT PAIN NOTED NONE PRSNT: CPT | Mod: HCNC,S$GLB,, | Performed by: INTERNAL MEDICINE

## 2020-01-28 PROCEDURE — 1101F PT FALLS ASSESS-DOCD LE1/YR: CPT | Mod: HCNC,CPTII,S$GLB, | Performed by: INTERNAL MEDICINE

## 2020-01-28 PROCEDURE — 1159F MED LIST DOCD IN RCRD: CPT | Mod: HCNC,S$GLB,, | Performed by: INTERNAL MEDICINE

## 2020-01-28 PROCEDURE — 99999 PR PBB SHADOW E&M-EST. PATIENT-LVL V: ICD-10-PCS | Mod: PBBFAC,HCNC,, | Performed by: INTERNAL MEDICINE

## 2020-01-28 PROCEDURE — 3079F DIAST BP 80-89 MM HG: CPT | Mod: HCNC,CPTII,S$GLB, | Performed by: INTERNAL MEDICINE

## 2020-01-28 PROCEDURE — 99214 OFFICE O/P EST MOD 30 MIN: CPT | Mod: 25,HCNC,S$GLB, | Performed by: INTERNAL MEDICINE

## 2020-01-28 PROCEDURE — 1159F PR MEDICATION LIST DOCUMENTED IN MEDICAL RECORD: ICD-10-PCS | Mod: HCNC,S$GLB,, | Performed by: INTERNAL MEDICINE

## 2020-01-28 PROCEDURE — 3075F PR MOST RECENT SYSTOLIC BLOOD PRESS GE 130-139MM HG: ICD-10-PCS | Mod: HCNC,CPTII,S$GLB, | Performed by: INTERNAL MEDICINE

## 2020-01-28 PROCEDURE — 90715 TDAP VACCINE GREATER THAN OR EQUAL TO 7YO IM: ICD-10-PCS | Mod: HCNC,S$GLB,, | Performed by: INTERNAL MEDICINE

## 2020-01-28 PROCEDURE — 3079F PR MOST RECENT DIASTOLIC BLOOD PRESSURE 80-89 MM HG: ICD-10-PCS | Mod: HCNC,CPTII,S$GLB, | Performed by: INTERNAL MEDICINE

## 2020-01-28 PROCEDURE — 99999 PR PBB SHADOW E&M-EST. PATIENT-LVL V: CPT | Mod: PBBFAC,HCNC,, | Performed by: INTERNAL MEDICINE

## 2020-01-28 PROCEDURE — 1126F PR PAIN SEVERITY QUANTIFIED, NO PAIN PRESENT: ICD-10-PCS | Mod: HCNC,S$GLB,, | Performed by: INTERNAL MEDICINE

## 2020-01-28 RX ORDER — SULFAMETHOXAZOLE AND TRIMETHOPRIM 800; 160 MG/1; MG/1
1 TABLET ORAL 2 TIMES DAILY
Qty: 20 TABLET | Refills: 0 | Status: SHIPPED | OUTPATIENT
Start: 2020-01-28 | End: 2020-02-07
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
## 2020-01-29 ENCOUNTER — TELEPHONE (OUTPATIENT)
Dept: WOUND CARE | Facility: CLINIC | Age: 75
End: 2020-01-29

## 2020-01-29 NOTE — TELEPHONE ENCOUNTER
----- Message from Razia Augustin MA sent at 1/29/2020  8:08 AM CST -----  Regarding: appointment  Good morning Dr. Hood would like patient to be seen for Cellulitis of left lower extremity Abrasion , he has a wound to left leg can you assist in scheduling a appointment for me.  Call patient wife Sari at [phone]      Thanks,  Razia [id number]

## 2020-01-29 NOTE — TELEPHONE ENCOUNTER
Return call to wife, Sari, no answer - left voice message asking wife to return call to [phone]  [phone] to discuss an appointment for 02/05/2020 at 3pm

## 2020-02-04 ENCOUNTER — PATIENT OUTREACH (OUTPATIENT)
Dept: ADMINISTRATIVE | Facility: OTHER | Age: 75
End: 2020-02-04

## 2020-02-05 ENCOUNTER — OFFICE VISIT (OUTPATIENT)
Dept: WOUND CARE | Facility: CLINIC | Age: 75
End: 2020-02-05
Payer: MEDICARE

## 2020-02-05 VITALS
HEART RATE: 72 BPM | BODY MASS INDEX: 42.46 KG/M2 | SYSTOLIC BLOOD PRESSURE: 138 MMHG | DIASTOLIC BLOOD PRESSURE: 77 MMHG | TEMPERATURE: 99 F | WEIGHT: 313.5 LBS | HEIGHT: 72 IN

## 2020-02-05 DIAGNOSIS — S80.811A ABRASION OF ANTERIOR RIGHT LOWER LEG, INITIAL ENCOUNTER: ICD-10-CM

## 2020-02-05 DIAGNOSIS — S80.811A ABRASION OF ANTERIOR RIGHT LOWER LEG, INITIAL ENCOUNTER: Primary | ICD-10-CM

## 2020-02-05 PROCEDURE — 99204 OFFICE O/P NEW MOD 45 MIN: CPT | Mod: HCNC,S$GLB,, | Performed by: NURSE PRACTITIONER

## 2020-02-05 PROCEDURE — 1159F PR MEDICATION LIST DOCUMENTED IN MEDICAL RECORD: ICD-10-PCS | Mod: HCNC,S$GLB,, | Performed by: NURSE PRACTITIONER

## 2020-02-05 PROCEDURE — 1100F PR PT FALLS ASSESS DOC 2+ FALLS/FALL W/INJURY/YR: ICD-10-PCS | Mod: HCNC,CPTII,S$GLB, | Performed by: NURSE PRACTITIONER

## 2020-02-05 PROCEDURE — 99999 PR PBB SHADOW E&M-EST. PATIENT-LVL IV: ICD-10-PCS | Mod: PBBFAC,HCNC,, | Performed by: NURSE PRACTITIONER

## 2020-02-05 PROCEDURE — 99204 PR OFFICE/OUTPT VISIT, NEW, LEVL IV, 45-59 MIN: ICD-10-PCS | Mod: HCNC,S$GLB,, | Performed by: NURSE PRACTITIONER

## 2020-02-05 PROCEDURE — 3075F SYST BP GE 130 - 139MM HG: CPT | Mod: HCNC,CPTII,S$GLB, | Performed by: NURSE PRACTITIONER

## 2020-02-05 PROCEDURE — 1125F AMNT PAIN NOTED PAIN PRSNT: CPT | Mod: HCNC,S$GLB,, | Performed by: NURSE PRACTITIONER

## 2020-02-05 PROCEDURE — 1159F MED LIST DOCD IN RCRD: CPT | Mod: HCNC,S$GLB,, | Performed by: NURSE PRACTITIONER

## 2020-02-05 PROCEDURE — 1100F PTFALLS ASSESS-DOCD GE2>/YR: CPT | Mod: HCNC,CPTII,S$GLB, | Performed by: NURSE PRACTITIONER

## 2020-02-05 PROCEDURE — 3288F FALL RISK ASSESSMENT DOCD: CPT | Mod: HCNC,CPTII,S$GLB, | Performed by: NURSE PRACTITIONER

## 2020-02-05 PROCEDURE — 99999 PR PBB SHADOW E&M-EST. PATIENT-LVL IV: CPT | Mod: PBBFAC,HCNC,, | Performed by: NURSE PRACTITIONER

## 2020-02-05 PROCEDURE — 3078F DIAST BP <80 MM HG: CPT | Mod: HCNC,CPTII,S$GLB, | Performed by: NURSE PRACTITIONER

## 2020-02-05 PROCEDURE — 3075F PR MOST RECENT SYSTOLIC BLOOD PRESS GE 130-139MM HG: ICD-10-PCS | Mod: HCNC,CPTII,S$GLB, | Performed by: NURSE PRACTITIONER

## 2020-02-05 PROCEDURE — 3288F PR FALLS RISK ASSESSMENT DOCUMENTED: ICD-10-PCS | Mod: HCNC,CPTII,S$GLB, | Performed by: NURSE PRACTITIONER

## 2020-02-05 PROCEDURE — 3078F PR MOST RECENT DIASTOLIC BLOOD PRESSURE < 80 MM HG: ICD-10-PCS | Mod: HCNC,CPTII,S$GLB, | Performed by: NURSE PRACTITIONER

## 2020-02-05 PROCEDURE — 1125F PR PAIN SEVERITY QUANTIFIED, PAIN PRESENT: ICD-10-PCS | Mod: HCNC,S$GLB,, | Performed by: NURSE PRACTITIONER

## 2020-02-05 NOTE — LETTER
February 5, 2020      Artem Hood MD  [address]  [facility] [address]           [facility]  [address]  [facility] [address]  Phone: [phone]          Patient: Jake Louie   MR Number: [id number]   YOB: 1945   Date of Visit: 2/5/2020       Dear Dr. Artem Hood:    Thank you for referring Jake Louie to me for evaluation. Attached you will find relevant portions of my assessment and plan of care.    If you have questions, please do not hesitate to call me. I look forward to following Jake Louie along with you.    Sincerely,    Gwendolyn Larsen, NP    Enclosure  CC:  No Recipients    If you would like to receive this communication electronically, please contact [email or web address] or [phone] to request more information on [facility] access.    For providers and/or their staff who would like to refer a patient to [facility], please contact us through our one-stop-shop provider referral line, [facility] , at [phone].    If you feel you have received this communication in error or would no longer like to receive these types of communications, please e-mail [email or web address]
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
## 2020-02-05 NOTE — PATIENT INSTRUCTIONS
Wound Care  Taking proper care of your wound will help it heal. Your healthcare provider may show you how to clean and dress the wound. He or she will also explain how to tell if the wound is healing normally. Here are the basic steps:      A wound that's not healing normally may be dark in color or have white streaks.    Wash Your Hands  · Use liquid soap and lather for 2 minutes. Scrub between your fingers and under your nails.  · Rinse with warm water, keeping your fingers pointing down.  · Use a paper towel to dry your hands and to turn off the faucet.  Remove the Used Dressing  · Set up your supplies.  · Put on disposable gloves if youre dressing a wound for someone else or your wound is infected.  · Gently take off the old dressing. If you have a drain or tube in the wound, be careful not to pull on it.  · Loosen the tape by pulling gently toward the wound.  · Remove the dressing one layer at a time and put it in a plastic bag.  · Remove your gloves.  Inspect and Dress the Wound  · Each time you change the dressing, inspect the wound carefully to be sure its healing normally.  · Wash your hands again. Put on a new pair of gloves if youre dressing a wound for someone else or if your wound is infected.  · Clean and dress the wound as directed by your doctor or nurse. If you have a drain or tube, be careful not to pull on it.  · Put all supplies in a plastic bag; seal the bag and put it in the trash.  · Be sure to wash your hands again.  Call your healthcare provider if you see any of the following signs of a problem:   · Bleeding that soaks the dressing  · Pink fluid weeping from the wound  · Increased drainage or drainage that is yellow, yellow-green, or foul-smelling  · Increased swelling or pain, or redness or swelling in the skin around the wound  · A change in the color of the wound  · An increase in the size of the wound  · A fever over 101.0°F, increased fatigue, or a loss of appetite.       ©  7457-4432 [facility], [address] [place] [address], [place] [address]. All rights reserved. This information is not intended as a substitute for professional medical care. Always follow your healthcare professional's instructions.    You may shower using a mild soap such as Dove.  Irrigate the wound with lukewarm water for 5 minutes and dry thoroughly.  Apply medioney gel to the wound, cover with cotton gauze and secure with roll gauze.  Use flexnet to hold dressing in place.  Change dressing daily.  Report any signs of infection.    You may purchase your supplies including the medihoney gel from [facility].  They are located at [address] [place] [address] in [place].  Their telephone number is [phone].

## 2020-02-06 ENCOUNTER — TELEPHONE (OUTPATIENT)
Dept: SLEEP MEDICINE | Facility: CLINIC | Age: 75
End: 2020-02-06

## 2020-02-06 NOTE — TELEPHONE ENCOUNTER
Called pt to reschedule barbara that he canceled via pt portal. Pt didn't answer and his vm has not been set up yet.

## 2020-02-07 ENCOUNTER — TELEPHONE (OUTPATIENT)
Dept: WOUND CARE | Facility: CLINIC | Age: 75
End: 2020-02-07

## 2020-02-07 DIAGNOSIS — S80.811A ABRASION OF ANTERIOR RIGHT LOWER LEG, INITIAL ENCOUNTER: ICD-10-CM

## 2020-02-07 NOTE — TELEPHONE ENCOUNTER
----- Message from Suni Thornton sent at 2/6/2020  4:48 PM CST -----  Contact: pt wife called [phone]  Prescription Fill  honey (MEDIHONEY, HONEY,) 80 % Gel 1 Tube       [facility] - [address]  [phone].      Pt wife called stating that prescription was not sent in

## 2020-02-13 ENCOUNTER — LAB VISIT (OUTPATIENT)
Dept: LAB | Facility: HOSPITAL | Age: 75
End: 2020-02-13
Attending: INTERNAL MEDICINE
Payer: MEDICARE

## 2020-02-13 ENCOUNTER — OFFICE VISIT (OUTPATIENT)
Dept: INTERNAL MEDICINE | Facility: CLINIC | Age: 75
End: 2020-02-13
Payer: MEDICARE

## 2020-02-13 VITALS
BODY MASS INDEX: 42.66 KG/M2 | HEIGHT: 72 IN | DIASTOLIC BLOOD PRESSURE: 82 MMHG | HEART RATE: 72 BPM | OXYGEN SATURATION: 98 % | SYSTOLIC BLOOD PRESSURE: 130 MMHG | WEIGHT: 315 LBS

## 2020-02-13 DIAGNOSIS — I35.8 AORTIC SYSTOLIC MURMUR ON EXAMINATION: ICD-10-CM

## 2020-02-13 DIAGNOSIS — G30.9 ALZHEIMER'S DEMENTIA WITHOUT BEHAVIORAL DISTURBANCE, UNSPECIFIED TIMING OF DEMENTIA ONSET: ICD-10-CM

## 2020-02-13 DIAGNOSIS — J45.909 ACUTE ASTHMATIC BRONCHITIS: ICD-10-CM

## 2020-02-13 DIAGNOSIS — J32.9 SINUSITIS, UNSPECIFIED CHRONICITY, UNSPECIFIED LOCATION: ICD-10-CM

## 2020-02-13 DIAGNOSIS — E11.9 TYPE 2 DIABETES MELLITUS WITHOUT COMPLICATION, WITHOUT LONG-TERM CURRENT USE OF INSULIN: ICD-10-CM

## 2020-02-13 DIAGNOSIS — F02.80 ALZHEIMER'S DEMENTIA WITHOUT BEHAVIORAL DISTURBANCE, UNSPECIFIED TIMING OF DEMENTIA ONSET: ICD-10-CM

## 2020-02-13 DIAGNOSIS — J45.909 ACUTE ASTHMATIC BRONCHITIS: Primary | ICD-10-CM

## 2020-02-13 LAB
BASOPHILS # BLD AUTO: 0.03 K/UL (ref 0–0.2)
BASOPHILS NFR BLD: 0.6 % (ref 0–1.9)
DIFFERENTIAL METHOD: ABNORMAL
EOSINOPHIL # BLD AUTO: 0.1 K/UL (ref 0–0.5)
EOSINOPHIL NFR BLD: 2.2 % (ref 0–8)
ERYTHROCYTE [DISTWIDTH] IN BLOOD BY AUTOMATED COUNT: 16 % (ref 11.5–14.5)
HCT VFR BLD AUTO: 50.9 % (ref 40–54)
HGB BLD-MCNC: 16 G/DL (ref 14–18)
IMM GRANULOCYTES # BLD AUTO: 0.01 K/UL (ref 0–0.04)
IMM GRANULOCYTES NFR BLD AUTO: 0.2 % (ref 0–0.5)
LYMPHOCYTES # BLD AUTO: 1.6 K/UL (ref 1–4.8)
LYMPHOCYTES NFR BLD: 30.1 % (ref 18–48)
MCH RBC QN AUTO: 28.4 PG (ref 27–31)
MCHC RBC AUTO-ENTMCNC: 31.4 G/DL (ref 32–36)
MCV RBC AUTO: 90 FL (ref 82–98)
MONOCYTES # BLD AUTO: 0.9 K/UL (ref 0.3–1)
MONOCYTES NFR BLD: 16.3 % (ref 4–15)
NEUTROPHILS # BLD AUTO: 2.8 K/UL (ref 1.8–7.7)
NEUTROPHILS NFR BLD: 50.6 % (ref 38–73)
NRBC BLD-RTO: 0 /100 WBC
PLATELET # BLD AUTO: 228 K/UL (ref 150–350)
PMV BLD AUTO: 10.1 FL (ref 9.2–12.9)
RBC # BLD AUTO: 5.64 M/UL (ref 4.6–6.2)
WBC # BLD AUTO: 5.45 K/UL (ref 3.9–12.7)

## 2020-02-13 PROCEDURE — 1125F PR PAIN SEVERITY QUANTIFIED, PAIN PRESENT: ICD-10-PCS | Mod: HCNC,S$GLB,, | Performed by: INTERNAL MEDICINE

## 2020-02-13 PROCEDURE — 99499 RISK ADDL DX/OHS AUDIT: ICD-10-PCS | Mod: HCNC,S$GLB,, | Performed by: INTERNAL MEDICINE

## 2020-02-13 PROCEDURE — 99214 PR OFFICE/OUTPT VISIT, EST, LEVL IV, 30-39 MIN: ICD-10-PCS | Mod: HCNC,S$GLB,, | Performed by: INTERNAL MEDICINE

## 2020-02-13 PROCEDURE — 99214 OFFICE O/P EST MOD 30 MIN: CPT | Mod: HCNC,S$GLB,, | Performed by: INTERNAL MEDICINE

## 2020-02-13 PROCEDURE — 80048 BASIC METABOLIC PNL TOTAL CA: CPT | Mod: HCNC

## 2020-02-13 PROCEDURE — 83880 ASSAY OF NATRIURETIC PEPTIDE: CPT | Mod: HCNC

## 2020-02-13 PROCEDURE — 3075F PR MOST RECENT SYSTOLIC BLOOD PRESS GE 130-139MM HG: ICD-10-PCS | Mod: HCNC,CPTII,S$GLB, | Performed by: INTERNAL MEDICINE

## 2020-02-13 PROCEDURE — 99499 UNLISTED E&M SERVICE: CPT | Mod: HCNC,S$GLB,, | Performed by: INTERNAL MEDICINE

## 2020-02-13 PROCEDURE — 99999 PR PBB SHADOW E&M-EST. PATIENT-LVL III: ICD-10-PCS | Mod: PBBFAC,HCNC,, | Performed by: INTERNAL MEDICINE

## 2020-02-13 PROCEDURE — 1125F AMNT PAIN NOTED PAIN PRSNT: CPT | Mod: HCNC,S$GLB,, | Performed by: INTERNAL MEDICINE

## 2020-02-13 PROCEDURE — 1159F MED LIST DOCD IN RCRD: CPT | Mod: HCNC,S$GLB,, | Performed by: INTERNAL MEDICINE

## 2020-02-13 PROCEDURE — 3079F PR MOST RECENT DIASTOLIC BLOOD PRESSURE 80-89 MM HG: ICD-10-PCS | Mod: HCNC,CPTII,S$GLB, | Performed by: INTERNAL MEDICINE

## 2020-02-13 PROCEDURE — 1159F PR MEDICATION LIST DOCUMENTED IN MEDICAL RECORD: ICD-10-PCS | Mod: HCNC,S$GLB,, | Performed by: INTERNAL MEDICINE

## 2020-02-13 PROCEDURE — 3044F PR MOST RECENT HEMOGLOBIN A1C LEVEL <7.0%: ICD-10-PCS | Mod: HCNC,CPTII,S$GLB, | Performed by: INTERNAL MEDICINE

## 2020-02-13 PROCEDURE — 3075F SYST BP GE 130 - 139MM HG: CPT | Mod: HCNC,CPTII,S$GLB, | Performed by: INTERNAL MEDICINE

## 2020-02-13 PROCEDURE — 36415 COLL VENOUS BLD VENIPUNCTURE: CPT | Mod: HCNC

## 2020-02-13 PROCEDURE — 3079F DIAST BP 80-89 MM HG: CPT | Mod: HCNC,CPTII,S$GLB, | Performed by: INTERNAL MEDICINE

## 2020-02-13 PROCEDURE — 3044F HG A1C LEVEL LT 7.0%: CPT | Mod: HCNC,CPTII,S$GLB, | Performed by: INTERNAL MEDICINE

## 2020-02-13 PROCEDURE — 99999 PR PBB SHADOW E&M-EST. PATIENT-LVL III: CPT | Mod: PBBFAC,HCNC,, | Performed by: INTERNAL MEDICINE

## 2020-02-13 PROCEDURE — 1101F PT FALLS ASSESS-DOCD LE1/YR: CPT | Mod: HCNC,CPTII,S$GLB, | Performed by: INTERNAL MEDICINE

## 2020-02-13 PROCEDURE — 1101F PR PT FALLS ASSESS DOC 0-1 FALLS W/OUT INJ PAST YR: ICD-10-PCS | Mod: HCNC,CPTII,S$GLB, | Performed by: INTERNAL MEDICINE

## 2020-02-13 PROCEDURE — 85025 COMPLETE CBC W/AUTO DIFF WBC: CPT | Mod: HCNC

## 2020-02-13 PROCEDURE — 83036 HEMOGLOBIN GLYCOSYLATED A1C: CPT | Mod: HCNC

## 2020-02-13 RX ORDER — ALBUTEROL SULFATE 90 UG/1
2 AEROSOL, METERED RESPIRATORY (INHALATION) EVERY 6 HOURS PRN
Qty: 8 G | Refills: 0 | Status: SHIPPED | OUTPATIENT
Start: 2020-02-13 | End: 2020-02-13 | Stop reason: SDUPTHER

## 2020-02-13 RX ORDER — MEMANTINE HYDROCHLORIDE 10 MG/1
10 TABLET ORAL 2 TIMES DAILY
Qty: 60 TABLET | Refills: 5 | Status: SHIPPED | OUTPATIENT
Start: 2020-02-13 | End: 2020-07-08 | Stop reason: SDUPTHER

## 2020-02-13 RX ORDER — ALBUTEROL SULFATE 90 UG/1
2 AEROSOL, METERED RESPIRATORY (INHALATION) EVERY 6 HOURS PRN
Qty: 8 G | Refills: 0 | Status: SHIPPED | OUTPATIENT
Start: 2020-02-13

## 2020-02-13 RX ORDER — AMOXICILLIN 875 MG/1
875 TABLET, FILM COATED ORAL 2 TIMES DAILY
Qty: 20 TABLET | Refills: 0 | Status: SHIPPED | OUTPATIENT
Start: 2020-02-13 | End: 2020-03-17

## 2020-02-13 RX ORDER — AMOXICILLIN 875 MG/1
875 TABLET, FILM COATED ORAL 2 TIMES DAILY
Qty: 20 TABLET | Refills: 0 | Status: SHIPPED | OUTPATIENT
Start: 2020-02-13 | End: 2020-02-13 | Stop reason: SDUPTHER

## 2020-02-13 NOTE — PROGRESS NOTES
PAST MEDICAL HISTORY:  Hypertension.  Hyperlipidemia.  Type 2 diabetes.  Complex sleep apnea with the use of CPAP  Dementia  Chronic lower extremity edema with venous insufficiency.  Morbid obesity.  Restless leg syndrome.  Right knee chondroplasty  Adenomatous colon polyp in 2002.  AAA, Endovascular stetnt     SOCIAL HISTORY:  Tobacco use, none.  Alcohol use, none.     MEDICATIONS:  Lisinopril HCT 20/25 mg.  Metformin 500 mg twice a day.  Pravastatin 20 mg a day.  Vitamin D.  [id number] mg b.i.d      74-year-old male  For 1 week, productive cough of yellow mucus, nasal congestion with blowing out yellow mucus, some shortness of breath and wheezing at night, no sore throat of fever    Recently seen January 20th for skin wound with cellulitis     treated and responded well to a course of Bactrim for 7 days    Examination  Weight 315 lb  Pulse is 72  Blood pressure 120/72  Neck no thyromegaly  Neck no adenopathy  O pharynx no abnormal findings  Lungs no rales, coarse rhonchi with forced expiration  Heart regular rate and rhythm with 2/6 systolic murmur from the base to the apex (a previous 2D echo showed aortic sclerosis but no stenosis )    Impression  Acute asthmatic bronchitis  Sinusitis  Cardiac murmur consistent with aortic valve sclerosis/stenosis    Plan  Amoxicillin for 10 days  Albuterol inhale  With history of diabetes a cardiac murmur    CBC BMP BNP hemoglobin A1c  In case course of steroids needed

## 2020-02-14 LAB
ANION GAP SERPL CALC-SCNC: 8 MMOL/L (ref 8–16)
BNP SERPL-MCNC: 14 PG/ML (ref 0–99)
BUN SERPL-MCNC: 18 MG/DL (ref 8–23)
CALCIUM SERPL-MCNC: 8.6 MG/DL (ref 8.7–10.5)
CHLORIDE SERPL-SCNC: 106 MMOL/L (ref 95–110)
CO2 SERPL-SCNC: 25 MMOL/L (ref 23–29)
CREAT SERPL-MCNC: 1.1 MG/DL (ref 0.5–1.4)
EST. GFR  (AFRICAN AMERICAN): >60 ML/MIN/1.73 M^2
EST. GFR  (NON AFRICAN AMERICAN): >60 ML/MIN/1.73 M^2
ESTIMATED AVG GLUCOSE: 143 MG/DL (ref 68–131)
GLUCOSE SERPL-MCNC: 124 MG/DL (ref 70–110)
HBA1C MFR BLD HPLC: 6.6 % (ref 4–5.6)
POTASSIUM SERPL-SCNC: 4.6 MMOL/L (ref 3.5–5.1)
SODIUM SERPL-SCNC: 139 MMOL/L (ref 136–145)

## 2020-02-28 ENCOUNTER — PATIENT OUTREACH (OUTPATIENT)
Dept: ADMINISTRATIVE | Facility: OTHER | Age: 75
End: 2020-02-28

## 2020-03-01 DIAGNOSIS — E11.9 DIABETES MELLITUS WITHOUT COMPLICATION: Primary | ICD-10-CM

## 2020-03-04 ENCOUNTER — TELEPHONE (OUTPATIENT)
Dept: NEUROLOGY | Facility: CLINIC | Age: 75
End: 2020-03-04

## 2020-03-04 NOTE — TELEPHONE ENCOUNTER
----- Message from Anaid Steinberg sent at 3/2/2020 10:23 AM CST -----  Contact: @[phone]  Pt has a family emergency and will not make the appt. Please call back to r/s @[phone]

## 2020-03-16 ENCOUNTER — PATIENT MESSAGE (OUTPATIENT)
Dept: INTERNAL MEDICINE | Facility: CLINIC | Age: 75
End: 2020-03-16

## 2020-03-17 RX ORDER — AMOXICILLIN 875 MG/1
875 TABLET, FILM COATED ORAL 2 TIMES DAILY
Qty: 20 TABLET | Refills: 0 | Status: SHIPPED | OUTPATIENT
Start: 2020-03-17 | End: 2020-04-23

## 2020-04-17 ENCOUNTER — PATIENT MESSAGE (OUTPATIENT)
Dept: INTERNAL MEDICINE | Facility: CLINIC | Age: 75
End: 2020-04-17

## 2020-04-17 DIAGNOSIS — I10 ESSENTIAL HYPERTENSION: ICD-10-CM

## 2020-04-17 DIAGNOSIS — E78.5 HYPERLIPIDEMIA, UNSPECIFIED HYPERLIPIDEMIA TYPE: ICD-10-CM

## 2020-04-17 DIAGNOSIS — R73.03 PREDIABETES: ICD-10-CM

## 2020-04-17 DIAGNOSIS — G47.30 SLEEP APNEA, UNSPECIFIED TYPE: ICD-10-CM

## 2020-04-17 RX ORDER — PRAVASTATIN SODIUM 20 MG/1
20 TABLET ORAL DAILY
Qty: 90 TABLET | Refills: 3 | Status: SHIPPED | OUTPATIENT
Start: 2020-04-17 | End: 2021-01-07

## 2020-04-21 NOTE — TELEPHONE ENCOUNTER
Called pt, no answer, left message that his med request was sent to [facility] pharmacy    Left phone number for return call [phone]

## 2020-04-23 ENCOUNTER — DOCUMENTATION ONLY (OUTPATIENT)
Dept: INTERNAL MEDICINE | Facility: CLINIC | Age: 75
End: 2020-04-23

## 2020-04-23 ENCOUNTER — PATIENT MESSAGE (OUTPATIENT)
Dept: INTERNAL MEDICINE | Facility: CLINIC | Age: 75
End: 2020-04-23

## 2020-04-23 RX ORDER — QUETIAPINE FUMARATE 25 MG/1
25 TABLET, FILM COATED ORAL NIGHTLY
Qty: 3 TABLET | Refills: 0 | Status: SHIPPED | OUTPATIENT
Start: 2020-04-23 | End: 2020-05-08

## 2020-04-23 RX ORDER — LAMOTRIGINE 25 MG/1
50 TABLET ORAL 2 TIMES DAILY
Qty: 56 TABLET | Refills: 0 | Status: SHIPPED | OUTPATIENT
Start: 2020-04-23 | End: 2020-05-08
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
## 2020-04-27 ENCOUNTER — TELEPHONE (OUTPATIENT)
Dept: ORTHOPEDICS | Facility: CLINIC | Age: 75
End: 2020-04-27

## 2020-04-27 NOTE — TELEPHONE ENCOUNTER
Attempt to contact patient. Unable to reach patient. Patient has been rescheduled for 11:00 on 05/14/2020. Will mail out a confirmation letter. Thanks.

## 2020-05-08 ENCOUNTER — PATIENT MESSAGE (OUTPATIENT)
Dept: INTERNAL MEDICINE | Facility: CLINIC | Age: 75
End: 2020-05-08

## 2020-05-08 RX ORDER — LAMOTRIGINE 100 MG/1
TABLET ORAL
Qty: 180 TABLET | OUTPATIENT
Start: 2020-05-08

## 2020-05-08 RX ORDER — LAMOTRIGINE 100 MG/1
100 TABLET ORAL 2 TIMES DAILY
Qty: 60 TABLET | Refills: 1 | Status: SHIPPED | OUTPATIENT
Start: 2020-05-08 | End: 2020-07-18

## 2020-05-11 ENCOUNTER — PATIENT MESSAGE (OUTPATIENT)
Dept: ORTHOPEDICS | Facility: CLINIC | Age: 75
End: 2020-05-11

## 2020-05-11 ENCOUNTER — PATIENT MESSAGE (OUTPATIENT)
Dept: INTERNAL MEDICINE | Facility: CLINIC | Age: 75
End: 2020-05-11

## 2020-05-12 ENCOUNTER — HOSPITAL ENCOUNTER (OUTPATIENT)
Dept: RADIOLOGY | Facility: HOSPITAL | Age: 75
Discharge: HOME | End: 2020-05-12
Attending: PHYSICIAN ASSISTANT
Payer: MEDICARE

## 2020-05-12 ENCOUNTER — PATIENT OUTREACH (OUTPATIENT)
Dept: ADMINISTRATIVE | Facility: OTHER | Age: 75
End: 2020-05-12

## 2020-05-12 ENCOUNTER — OFFICE VISIT (OUTPATIENT)
Dept: ORTHOPEDICS | Facility: CLINIC | Age: 75
End: 2020-05-12
Payer: MEDICARE

## 2020-05-12 VITALS
BODY MASS INDEX: 42.73 KG/M2 | SYSTOLIC BLOOD PRESSURE: 146 MMHG | TEMPERATURE: 99 F | DIASTOLIC BLOOD PRESSURE: 70 MMHG | WEIGHT: 315 LBS | HEART RATE: 87 BPM

## 2020-05-12 DIAGNOSIS — M17.0 PRIMARY OSTEOARTHRITIS OF BOTH KNEES: Primary | ICD-10-CM

## 2020-05-12 DIAGNOSIS — R52 PAIN: ICD-10-CM

## 2020-05-12 PROCEDURE — 20610 PR DRAIN/INJECT LARGE JOINT/BURSA: ICD-10-PCS | Mod: 50,HCNC,S$GLB, | Performed by: PHYSICIAN ASSISTANT

## 2020-05-12 PROCEDURE — 3077F PR MOST RECENT SYSTOLIC BLOOD PRESSURE >= 140 MM HG: ICD-10-PCS | Mod: HCNC,CPTII,S$GLB, | Performed by: PHYSICIAN ASSISTANT

## 2020-05-12 PROCEDURE — 99213 OFFICE O/P EST LOW 20 MIN: CPT | Mod: 25,HCNC,S$GLB, | Performed by: PHYSICIAN ASSISTANT

## 2020-05-12 PROCEDURE — 1101F PR PT FALLS ASSESS DOC 0-1 FALLS W/OUT INJ PAST YR: ICD-10-PCS | Mod: HCNC,CPTII,S$GLB, | Performed by: PHYSICIAN ASSISTANT

## 2020-05-12 PROCEDURE — 3077F SYST BP >= 140 MM HG: CPT | Mod: HCNC,CPTII,S$GLB, | Performed by: PHYSICIAN ASSISTANT

## 2020-05-12 PROCEDURE — 3078F PR MOST RECENT DIASTOLIC BLOOD PRESSURE < 80 MM HG: ICD-10-PCS | Mod: HCNC,CPTII,S$GLB, | Performed by: PHYSICIAN ASSISTANT

## 2020-05-12 PROCEDURE — 20610 DRAIN/INJ JOINT/BURSA W/O US: CPT | Mod: 50,HCNC,S$GLB, | Performed by: PHYSICIAN ASSISTANT

## 2020-05-12 PROCEDURE — 73562 X-RAY EXAM OF KNEE 3: CPT | Mod: 26,50,HCNC, | Performed by: RADIOLOGY

## 2020-05-12 PROCEDURE — 99213 PR OFFICE/OUTPT VISIT, EST, LEVL III, 20-29 MIN: ICD-10-PCS | Mod: 25,HCNC,S$GLB, | Performed by: PHYSICIAN ASSISTANT

## 2020-05-12 PROCEDURE — 3078F DIAST BP <80 MM HG: CPT | Mod: HCNC,CPTII,S$GLB, | Performed by: PHYSICIAN ASSISTANT

## 2020-05-12 PROCEDURE — 73562 XR KNEE ORTHO BILAT: ICD-10-PCS | Mod: 26,50,HCNC, | Performed by: RADIOLOGY

## 2020-05-12 PROCEDURE — 1159F MED LIST DOCD IN RCRD: CPT | Mod: HCNC,S$GLB,, | Performed by: PHYSICIAN ASSISTANT

## 2020-05-12 PROCEDURE — 1101F PT FALLS ASSESS-DOCD LE1/YR: CPT | Mod: HCNC,CPTII,S$GLB, | Performed by: PHYSICIAN ASSISTANT

## 2020-05-12 PROCEDURE — 73562 X-RAY EXAM OF KNEE 3: CPT | Mod: TC,50,HCNC

## 2020-05-12 PROCEDURE — 99999 PR PBB SHADOW E&M-EST. PATIENT-LVL III: CPT | Mod: PBBFAC,HCNC,, | Performed by: PHYSICIAN ASSISTANT

## 2020-05-12 PROCEDURE — 99999 PR PBB SHADOW E&M-EST. PATIENT-LVL III: ICD-10-PCS | Mod: PBBFAC,HCNC,, | Performed by: PHYSICIAN ASSISTANT

## 2020-05-12 PROCEDURE — 1159F PR MEDICATION LIST DOCUMENTED IN MEDICAL RECORD: ICD-10-PCS | Mod: HCNC,S$GLB,, | Performed by: PHYSICIAN ASSISTANT

## 2020-05-12 RX ORDER — METHYLPREDNISOLONE ACETATE 80 MG/ML
80 INJECTION, SUSPENSION INTRA-ARTICULAR; INTRALESIONAL; INTRAMUSCULAR; SOFT TISSUE
Status: COMPLETED | OUTPATIENT
Start: 2020-05-12 | End: 2020-05-12

## 2020-05-12 RX ADMIN — METHYLPREDNISOLONE ACETATE 80 MG: 80 INJECTION, SUSPENSION INTRA-ARTICULAR; INTRALESIONAL; INTRAMUSCULAR; SOFT TISSUE at 02:05

## 2020-05-12 NOTE — PROGRESS NOTES
Subjective:      Patient ID: Jake Louie is a 74 y.o. male.    Chief Complaint: Pain of the Left Knee and Pain of the Right Knee    HPI  Patient returns regarding his bilateral knee pain R>L. He has h/o OA. Pain returned recently. He had good relief with cortisone injections in the past. Last injection was a year ago.   Review of Systems   Constitution: Negative for chills, fever and night sweats.   Cardiovascular: Negative for chest pain.   Respiratory: Negative for cough and shortness of breath.    Hematologic/Lymphatic: Does not bruise/bleed easily.   Skin: Negative for color change.   Gastrointestinal: Negative for heartburn.   Genitourinary: Negative for dysuria.   Neurological: Negative for numbness and paresthesias.   Psychiatric/Behavioral: Negative for altered mental status.   Allergic/Immunologic: Negative for persistent infections.         Objective:            General    Vitals reviewed.  Constitutional: He is oriented to person, place, and time. He appears well-developed and well-nourished.   Cardiovascular: Normal rate.    Neurological: He is alert and oriented to person, place, and time.             Right Knee Exam:  ROM 5-120 degrees  No effusion  No warmth or erythema  No TTP    Left Knee Exam:  ROM 0-110 degrees  No effusion  No warmth or erythema  No TTP    X-ray: ordered and reviewed by myself. Bilateral tricompartmental osteoarthritis which is most severe in the medial compartments.      Assessment:       Encounter Diagnosis   Name Primary?    Primary osteoarthritis of both knees Yes          Plan:       Patient would like to repeat cortisone injections. RTC prn.     PROCEDURE:  I have explained the risks, benefits, and alternatives of the procedure in detail.  The patient voices understanding and all questions have been answered.  The patient agrees to proceed as planned. So after I performed a sterile prep of the skin in the normal fashion the bilateral knee is injected using a 22  gauge needle from the anteromedial approach with a combination of 4cc 1% plain lidocaine and 80 mg of depo medrol.  The patient is cautioned and immediate relief of pain is secondary to the local anesthetic and will be temporary.  After the anesthetic wears off there may be a increase in pain that may last for a few hours or a few days and they should use ice to help alleviate this flair up of pain.

## 2020-06-04 DIAGNOSIS — M17.0 PRIMARY OSTEOARTHRITIS OF BOTH KNEES: Primary | ICD-10-CM

## 2020-06-04 NOTE — PROGRESS NOTES
Patient with h/o bilateral knee OA. He had cortisone injections on 5-12-20 with no relief. He would like to try Monovisc injections. Order was placed. RTC in 2 weeks pending insurance approval.

## 2020-06-17 ENCOUNTER — PATIENT OUTREACH (OUTPATIENT)
Dept: ADMINISTRATIVE | Facility: OTHER | Age: 75
End: 2020-06-17

## 2020-06-18 ENCOUNTER — TELEPHONE (OUTPATIENT)
Dept: ORTHOPEDICS | Facility: CLINIC | Age: 75
End: 2020-06-18

## 2020-06-18 NOTE — TELEPHONE ENCOUNTER
LVM to ask pt how he is feeling.  And to call us back when he is able and feeling well to reschedule appt.

## 2020-06-23 ENCOUNTER — OFFICE VISIT (OUTPATIENT)
Dept: ORTHOPEDICS | Facility: CLINIC | Age: 75
End: 2020-06-23
Payer: MEDICARE

## 2020-06-23 ENCOUNTER — TELEPHONE (OUTPATIENT)
Dept: NEUROLOGY | Facility: CLINIC | Age: 75
End: 2020-06-23

## 2020-06-23 VITALS — BODY MASS INDEX: 42.73 KG/M2 | WEIGHT: 315 LBS

## 2020-06-23 DIAGNOSIS — M17.0 PRIMARY OSTEOARTHRITIS OF BOTH KNEES: Primary | ICD-10-CM

## 2020-06-23 PROCEDURE — 99499 UNLISTED E&M SERVICE: CPT | Mod: HCNC,S$GLB,, | Performed by: PHYSICIAN ASSISTANT

## 2020-06-23 PROCEDURE — 99999 PR PBB SHADOW E&M-EST. PATIENT-LVL III: CPT | Mod: PBBFAC,HCNC,, | Performed by: PHYSICIAN ASSISTANT

## 2020-06-23 PROCEDURE — 20610 PR DRAIN/INJECT LARGE JOINT/BURSA: ICD-10-PCS | Mod: 50,HCNC,S$GLB, | Performed by: PHYSICIAN ASSISTANT

## 2020-06-23 PROCEDURE — 99499 NO LOS: ICD-10-PCS | Mod: HCNC,S$GLB,, | Performed by: PHYSICIAN ASSISTANT

## 2020-06-23 PROCEDURE — 99999 PR PBB SHADOW E&M-EST. PATIENT-LVL III: ICD-10-PCS | Mod: PBBFAC,HCNC,, | Performed by: PHYSICIAN ASSISTANT

## 2020-06-23 PROCEDURE — 20610 DRAIN/INJ JOINT/BURSA W/O US: CPT | Mod: 50,HCNC,S$GLB, | Performed by: PHYSICIAN ASSISTANT

## 2020-06-23 NOTE — TELEPHONE ENCOUNTER
----- Message from Charlie Miguel MD sent at 6/22/2020 10:43 AM CDT -----  Selma Gardner! This patient is scheduled on Thursday afternoon (July 9th) which is meant only for stroke follow ups. He's a patient of [facility] for [facility]. See if we can reschedule him to July 8th at 1pm? Thanks!

## 2020-06-23 NOTE — TELEPHONE ENCOUNTER
Left a message for the patient to let him know his appointment on 7/9 was scheduled incorrectly. He was rescheduled to 7/8 at 1:00 and asked to call back to confirm or to reschedule if its not a good day and time for him.

## 2020-06-23 NOTE — PROGRESS NOTES
Subjective:      Patient ID: Jake Louie is a 75 y.o. male.    Chief Complaint: Pain and Injections of the Left Knee and Pain and Injections of the Right Knee    HPI  Patient returns for the Monovisc injection both knees.   Review of Systems   Constitution: Negative for chills, fever and night sweats.   Cardiovascular: Negative for chest pain.   Respiratory: Negative for cough and shortness of breath.    Hematologic/Lymphatic: Does not bruise/bleed easily.   Skin: Negative for color change.   Gastrointestinal: Negative for heartburn.   Genitourinary: Negative for dysuria.   Neurological: Negative for numbness and paresthesias.   Psychiatric/Behavioral: Negative for altered mental status.   Allergic/Immunologic: Negative for persistent infections.         Objective:            Ortho/SPM Exam  The bilateral knee is examined, there is no evidence of swelling or effusion.  There is no evidence of erythema. Skin, pulses, sensation are intact.            Assessment:       Encounter Diagnosis   Name Primary?    Primary osteoarthritis of both knees Yes          Plan:         PROCEDURE:  I have explained the risks, benefits, and alternatives of the procedure in detail.  The patient voices understanding and all questions have been answered.  The patient agrees to proceed as planned. So after I performed a sterile prep of the skin in the normal fashion the bilateral knee is injected using a 22 gauge needle from the anteromedial approach with 4cc of monovisc solution. The patient is reminded that it can take 6 - 8 weeks to see all the affects of this treatment, they must complete all three injections to see all the affects and the treatment can not be repeated any earlier than six months.

## 2020-07-07 ENCOUNTER — PATIENT OUTREACH (OUTPATIENT)
Dept: ADMINISTRATIVE | Facility: OTHER | Age: 75
End: 2020-07-07

## 2020-07-07 NOTE — PROGRESS NOTES
Requested updates within Care Everywhere.  Patient's chart was reviewed for overdue HUMBERTO topics.  Immunizations reconciled.

## 2020-07-08 ENCOUNTER — OFFICE VISIT (OUTPATIENT)
Dept: NEUROLOGY | Facility: CLINIC | Age: 75
End: 2020-07-08
Payer: MEDICARE

## 2020-07-08 VITALS
DIASTOLIC BLOOD PRESSURE: 67 MMHG | HEART RATE: 73 BPM | HEIGHT: 72 IN | BODY MASS INDEX: 42.73 KG/M2 | SYSTOLIC BLOOD PRESSURE: 117 MMHG

## 2020-07-08 DIAGNOSIS — D64.9 ANEMIA, UNSPECIFIED TYPE: ICD-10-CM

## 2020-07-08 DIAGNOSIS — G30.9 ALZHEIMER'S DEMENTIA WITHOUT BEHAVIORAL DISTURBANCE, UNSPECIFIED TIMING OF DEMENTIA ONSET: Primary | ICD-10-CM

## 2020-07-08 DIAGNOSIS — F02.80 ALZHEIMER'S DEMENTIA WITHOUT BEHAVIORAL DISTURBANCE, UNSPECIFIED TIMING OF DEMENTIA ONSET: Primary | ICD-10-CM

## 2020-07-08 PROCEDURE — 99999 PR PBB SHADOW E&M-EST. PATIENT-LVL III: CPT | Mod: PBBFAC,HCNC,GC, | Performed by: STUDENT IN AN ORGANIZED HEALTH CARE EDUCATION/TRAINING PROGRAM

## 2020-07-08 PROCEDURE — 99999 PR PBB SHADOW E&M-EST. PATIENT-LVL III: ICD-10-PCS | Mod: PBBFAC,HCNC,GC, | Performed by: STUDENT IN AN ORGANIZED HEALTH CARE EDUCATION/TRAINING PROGRAM

## 2020-07-08 RX ORDER — MEMANTINE HYDROCHLORIDE 10 MG/1
10 TABLET ORAL 2 TIMES DAILY
Qty: 60 TABLET | Refills: 5 | Status: SHIPPED | OUTPATIENT
Start: 2020-07-08 | End: 2021-03-22 | Stop reason: SDUPTHER

## 2020-07-08 RX ORDER — ASPIRIN 81 MG/1
81 TABLET ORAL DAILY
COMMUNITY
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
## 2020-07-12 NOTE — ASSESSMENT & PLAN NOTE
-- Continue Namenda   -- Iron panel, Ferritin to rule out RLS  -- Sleep hygiene discussed.   -- Counseled on smoking cessation  -- F/u in 5 months.

## 2020-07-16 DIAGNOSIS — G30.9 ALZHEIMER'S DEMENTIA WITHOUT BEHAVIORAL DISTURBANCE, UNSPECIFIED TIMING OF DEMENTIA ONSET: ICD-10-CM

## 2020-07-16 DIAGNOSIS — F02.80 ALZHEIMER'S DEMENTIA WITHOUT BEHAVIORAL DISTURBANCE, UNSPECIFIED TIMING OF DEMENTIA ONSET: ICD-10-CM

## 2020-07-16 RX ORDER — MEMANTINE HYDROCHLORIDE 10 MG/1
10 TABLET ORAL 2 TIMES DAILY
Qty: 60 TABLET | Refills: 5 | Status: CANCELLED | OUTPATIENT
Start: 2020-07-16 | End: 2021-01-12

## 2020-08-10 ENCOUNTER — TELEPHONE (OUTPATIENT)
Dept: SLEEP MEDICINE | Facility: CLINIC | Age: 75
End: 2020-08-10

## 2020-08-10 NOTE — TELEPHONE ENCOUNTER
----- Message from Rodolfo Saldaña sent at 8/7/2020  5:41 PM CDT -----  Regarding: Ms.Linda Louie/wife   called in to speak with someone at the office regarding scheduling an appointment. She would like a call back from the office and can be reached at    [phone]

## 2020-08-10 NOTE — TELEPHONE ENCOUNTER
----- Message from Caro Escobar sent at 8/10/2020  4:34 PM CDT -----  Regarding: missed call  Type:  Patient Returning Call    Who Called:Patient's wife Sari  Who Left Message for Patient:Rosanna  Does the patient know what this is regarding?:appointment  Would the patient rather a call back or a response via [facility]? callback  Best Call Back Number:[phone]  Additional Information:

## 2020-08-10 NOTE — TELEPHONE ENCOUNTER
----- Message from Rodolfo Saldaña sent at 8/7/2020  5:41 PM CDT -----  Regarding: Ms.Linda Louie/wife   called in to speak with someone at the office regarding scheduling an appointment. She would like a call back from the office and can be reached at    [phone]

## 2020-08-14 ENCOUNTER — PATIENT OUTREACH (OUTPATIENT)
Dept: ADMINISTRATIVE | Facility: OTHER | Age: 75
End: 2020-08-14

## 2020-08-14 NOTE — PROGRESS NOTES
Chart reviewed.   Immunizations: Triggered Imm Registry     Orders placed: n/a  Upcoming appts to satisfy [facility] topics: n/a

## 2020-08-17 ENCOUNTER — TELEPHONE (OUTPATIENT)
Dept: SLEEP MEDICINE | Facility: CLINIC | Age: 75
End: 2020-08-17

## 2020-08-17 NOTE — TELEPHONE ENCOUNTER
----- Message from Kina Jaffe sent at 8/17/2020 11:21 AM CDT -----  Regarding: Patient call back  Who called:Sari (wife)    What is the request in detail: Patient's wife is requesting a call back. She states the patient is having a very bad week. She states he has recently had a death in the family that has not yet been explained to him, and he has dementia, and is generally irate. She states he is confused about why he needs to see the staff any way, though his wife understands he is having issues sleeping. She has to cancel his appt today and will r/s late. She would like to know if the staff can call her AT A LATER DATE to discuss why she would prefer if the staff just scanned his cpap results and spoke to her over the phone regarding them.   Please advise.    Can the clinic reply by [facility]? No    Best call back number: [phone]    Additional Information: N/A

## 2020-08-24 ENCOUNTER — OFFICE VISIT (OUTPATIENT)
Dept: INTERNAL MEDICINE | Facility: CLINIC | Age: 75
End: 2020-08-24
Payer: MEDICARE

## 2020-08-24 ENCOUNTER — LAB VISIT (OUTPATIENT)
Dept: LAB | Facility: HOSPITAL | Age: 75
End: 2020-08-24
Attending: INTERNAL MEDICINE
Payer: MEDICARE

## 2020-08-24 VITALS
DIASTOLIC BLOOD PRESSURE: 72 MMHG | OXYGEN SATURATION: 96 % | WEIGHT: 315 LBS | HEIGHT: 72 IN | HEART RATE: 68 BPM | BODY MASS INDEX: 42.66 KG/M2 | SYSTOLIC BLOOD PRESSURE: 126 MMHG

## 2020-08-24 DIAGNOSIS — R10.32 LLQ ABDOMINAL PAIN: Primary | ICD-10-CM

## 2020-08-24 DIAGNOSIS — E11.9 TYPE 2 DIABETES MELLITUS WITHOUT COMPLICATION, WITHOUT LONG-TERM CURRENT USE OF INSULIN: ICD-10-CM

## 2020-08-24 DIAGNOSIS — I10 ESSENTIAL HYPERTENSION: ICD-10-CM

## 2020-08-24 DIAGNOSIS — R10.32 LLQ ABDOMINAL PAIN: ICD-10-CM

## 2020-08-24 LAB
ANION GAP SERPL CALC-SCNC: 6 MMOL/L (ref 8–16)
BASOPHILS # BLD AUTO: 0.05 K/UL (ref 0–0.2)
BASOPHILS NFR BLD: 0.8 % (ref 0–1.9)
BILIRUB UR QL STRIP: NEGATIVE
BUN SERPL-MCNC: 18 MG/DL (ref 8–23)
CALCIUM SERPL-MCNC: 8.9 MG/DL (ref 8.7–10.5)
CHLORIDE SERPL-SCNC: 108 MMOL/L (ref 95–110)
CLARITY UR REFRACT.AUTO: CLEAR
CO2 SERPL-SCNC: 26 MMOL/L (ref 23–29)
COLOR UR AUTO: YELLOW
CREAT SERPL-MCNC: 1 MG/DL (ref 0.5–1.4)
CRP SERPL-MCNC: 8.5 MG/L (ref 0–8.2)
DIFFERENTIAL METHOD: ABNORMAL
EOSINOPHIL # BLD AUTO: 0.1 K/UL (ref 0–0.5)
EOSINOPHIL NFR BLD: 2 % (ref 0–8)
ERYTHROCYTE [DISTWIDTH] IN BLOOD BY AUTOMATED COUNT: 15.5 % (ref 11.5–14.5)
EST. GFR  (AFRICAN AMERICAN): >60 ML/MIN/1.73 M^2
EST. GFR  (NON AFRICAN AMERICAN): >60 ML/MIN/1.73 M^2
ESTIMATED AVG GLUCOSE: 146 MG/DL (ref 68–131)
GLUCOSE SERPL-MCNC: 119 MG/DL (ref 70–110)
GLUCOSE UR QL STRIP: NEGATIVE
HBA1C MFR BLD HPLC: 6.7 % (ref 4–5.6)
HCT VFR BLD AUTO: 48 % (ref 40–54)
HGB BLD-MCNC: 15.3 G/DL (ref 14–18)
HGB UR QL STRIP: NEGATIVE
IMM GRANULOCYTES # BLD AUTO: 0.02 K/UL (ref 0–0.04)
IMM GRANULOCYTES NFR BLD AUTO: 0.3 % (ref 0–0.5)
KETONES UR QL STRIP: NEGATIVE
LEUKOCYTE ESTERASE UR QL STRIP: NEGATIVE
LYMPHOCYTES # BLD AUTO: 2.1 K/UL (ref 1–4.8)
LYMPHOCYTES NFR BLD: 32.1 % (ref 18–48)
MCH RBC QN AUTO: 28.3 PG (ref 27–31)
MCHC RBC AUTO-ENTMCNC: 31.9 G/DL (ref 32–36)
MCV RBC AUTO: 89 FL (ref 82–98)
MONOCYTES # BLD AUTO: 0.8 K/UL (ref 0.3–1)
MONOCYTES NFR BLD: 12 % (ref 4–15)
NEUTROPHILS # BLD AUTO: 3.5 K/UL (ref 1.8–7.7)
NEUTROPHILS NFR BLD: 52.8 % (ref 38–73)
NITRITE UR QL STRIP: NEGATIVE
NRBC BLD-RTO: 0 /100 WBC
PH UR STRIP: 5 [PH] (ref 5–8)
PLATELET # BLD AUTO: 241 K/UL (ref 150–350)
PMV BLD AUTO: 9.9 FL (ref 9.2–12.9)
POTASSIUM SERPL-SCNC: 4.1 MMOL/L (ref 3.5–5.1)
PROT UR QL STRIP: NEGATIVE
RBC # BLD AUTO: 5.4 M/UL (ref 4.6–6.2)
SODIUM SERPL-SCNC: 140 MMOL/L (ref 136–145)
SP GR UR STRIP: 1.02 (ref 1–1.03)
URN SPEC COLLECT METH UR: NORMAL
WBC # BLD AUTO: 6.57 K/UL (ref 3.9–12.7)

## 2020-08-24 PROCEDURE — 85025 COMPLETE CBC W/AUTO DIFF WBC: CPT | Mod: HCNC

## 2020-08-24 PROCEDURE — 99214 OFFICE O/P EST MOD 30 MIN: CPT | Mod: HCNC,S$GLB,, | Performed by: INTERNAL MEDICINE

## 2020-08-24 PROCEDURE — 3008F BODY MASS INDEX DOCD: CPT | Mod: HCNC,CPTII,S$GLB, | Performed by: INTERNAL MEDICINE

## 2020-08-24 PROCEDURE — 3008F PR BODY MASS INDEX (BMI) DOCUMENTED: ICD-10-PCS | Mod: HCNC,CPTII,S$GLB, | Performed by: INTERNAL MEDICINE

## 2020-08-24 PROCEDURE — 36415 COLL VENOUS BLD VENIPUNCTURE: CPT | Mod: HCNC

## 2020-08-24 PROCEDURE — 99214 PR OFFICE/OUTPT VISIT, EST, LEVL IV, 30-39 MIN: ICD-10-PCS | Mod: HCNC,S$GLB,, | Performed by: INTERNAL MEDICINE

## 2020-08-24 PROCEDURE — 1159F PR MEDICATION LIST DOCUMENTED IN MEDICAL RECORD: ICD-10-PCS | Mod: HCNC,S$GLB,, | Performed by: INTERNAL MEDICINE

## 2020-08-24 PROCEDURE — 3078F DIAST BP <80 MM HG: CPT | Mod: HCNC,CPTII,S$GLB, | Performed by: INTERNAL MEDICINE

## 2020-08-24 PROCEDURE — 1159F MED LIST DOCD IN RCRD: CPT | Mod: HCNC,S$GLB,, | Performed by: INTERNAL MEDICINE

## 2020-08-24 PROCEDURE — 3078F PR MOST RECENT DIASTOLIC BLOOD PRESSURE < 80 MM HG: ICD-10-PCS | Mod: HCNC,CPTII,S$GLB, | Performed by: INTERNAL MEDICINE

## 2020-08-24 PROCEDURE — 99499 RISK ADDL DX/OHS AUDIT: ICD-10-PCS | Mod: HCNC,S$GLB,, | Performed by: INTERNAL MEDICINE

## 2020-08-24 PROCEDURE — 1125F AMNT PAIN NOTED PAIN PRSNT: CPT | Mod: HCNC,S$GLB,, | Performed by: INTERNAL MEDICINE

## 2020-08-24 PROCEDURE — 1125F PR PAIN SEVERITY QUANTIFIED, PAIN PRESENT: ICD-10-PCS | Mod: HCNC,S$GLB,, | Performed by: INTERNAL MEDICINE

## 2020-08-24 PROCEDURE — 3044F PR MOST RECENT HEMOGLOBIN A1C LEVEL <7.0%: ICD-10-PCS | Mod: HCNC,CPTII,S$GLB, | Performed by: INTERNAL MEDICINE

## 2020-08-24 PROCEDURE — 3074F SYST BP LT 130 MM HG: CPT | Mod: HCNC,CPTII,S$GLB, | Performed by: INTERNAL MEDICINE

## 2020-08-24 PROCEDURE — 99499 UNLISTED E&M SERVICE: CPT | Mod: HCNC,S$GLB,, | Performed by: INTERNAL MEDICINE

## 2020-08-24 PROCEDURE — 1101F PT FALLS ASSESS-DOCD LE1/YR: CPT | Mod: HCNC,CPTII,S$GLB, | Performed by: INTERNAL MEDICINE

## 2020-08-24 PROCEDURE — 81003 URINALYSIS AUTO W/O SCOPE: CPT | Mod: HCNC

## 2020-08-24 PROCEDURE — 99999 PR PBB SHADOW E&M-EST. PATIENT-LVL IV: CPT | Mod: PBBFAC,HCNC,, | Performed by: INTERNAL MEDICINE

## 2020-08-24 PROCEDURE — 3074F PR MOST RECENT SYSTOLIC BLOOD PRESSURE < 130 MM HG: ICD-10-PCS | Mod: HCNC,CPTII,S$GLB, | Performed by: INTERNAL MEDICINE

## 2020-08-24 PROCEDURE — 1101F PR PT FALLS ASSESS DOC 0-1 FALLS W/OUT INJ PAST YR: ICD-10-PCS | Mod: HCNC,CPTII,S$GLB, | Performed by: INTERNAL MEDICINE

## 2020-08-24 PROCEDURE — 99499 UNLISTED E&M SERVICE: CPT | Mod: HCNC,,, | Performed by: INTERNAL MEDICINE

## 2020-08-24 PROCEDURE — 80048 BASIC METABOLIC PNL TOTAL CA: CPT | Mod: HCNC

## 2020-08-24 PROCEDURE — 99499 RISK ADDL DX/OHS AUDIT: ICD-10-PCS | Mod: HCNC,,, | Performed by: INTERNAL MEDICINE

## 2020-08-24 PROCEDURE — 3044F HG A1C LEVEL LT 7.0%: CPT | Mod: HCNC,CPTII,S$GLB, | Performed by: INTERNAL MEDICINE

## 2020-08-24 PROCEDURE — 83036 HEMOGLOBIN GLYCOSYLATED A1C: CPT | Mod: HCNC

## 2020-08-24 PROCEDURE — 86140 C-REACTIVE PROTEIN: CPT | Mod: HCNC

## 2020-08-24 PROCEDURE — 99999 PR PBB SHADOW E&M-EST. PATIENT-LVL IV: ICD-10-PCS | Mod: PBBFAC,HCNC,, | Performed by: INTERNAL MEDICINE

## 2020-08-24 RX ORDER — QUETIAPINE FUMARATE 25 MG/1
25 TABLET, FILM COATED ORAL NIGHTLY
Qty: 5 TABLET | Refills: 0 | Status: SHIPPED | OUTPATIENT
Start: 2020-08-24 | End: 2020-09-09

## 2020-08-24 NOTE — PROGRESS NOTES
Results was discussed with his wife    C-reactive protein was minimally up, will continue to observe, but if there is still recurring abdominal pain will consider CT    Hemoglobin A1c is also a bit high as  6.7   recommend increase metformin to 2 twice a day      The wife notes that the use of Lamictal has improved his episodes of agitation but at times he will go through periods of agitation and at times being hostile  She may notices more when is lack of sleep although she feels he is doing well with the use of CPAP  He did not respond well to the use of trazodone but will give a short course of Seroquel 25 mg at bedtime and she will keep me informed

## 2020-08-24 NOTE — PROGRESS NOTES
PAST MEDICAL HISTORY:  Hypertension.  Hyperlipidemia.  Type 2 diabetes.  Complex sleep apnea with the use of CPAP  Dementia  Chronic lower extremity edema with venous insufficiency.  Morbid obesity.  Restless leg syndrome.  Right knee chondroplasty  Adenomatous colon polyp in 2002.  AAA, Endovascular stetnt     SOCIAL HISTORY:  Tobacco use, none.  Alcohol use, none.     MEDICATIONS:  Lisinopril HCT 20/25 mg.  Metformin 500 mg twice a day.  Pravastatin 20 mg a day.  Vitamin D.  [id number] mg b.i.d  Lamictal 100 mg b.i.d.        75-year-old male, here with his wife, who provides most of the history, due to his history of dementia  This morning after awakening he started noticing left lower quadrant pain  It does not seem to progress or worsen  There has been no nausea vomiting or fever  There appears to be nothing different about urinating having a bowel movement.  He was able to urinate without difficulty and complains of no dysuria and there is no diarrhea    Examination  Weight 324 lb  Pulse 68  Blood pressure 138/72  Chest clear breath sounds  Heart regular rate and rhythm no murmurs  Abdominal exam very protuberant, nontender in all quadrants    Impression  Left lower quadrant abdominal pain  Type 2 diabetes  Hypertension    Plan  CBC basic metabolic profile C-reactive protein, hemoglobin A1c, urinalysis  If the pain worsens or persists or pain things abnormal with the labs, may need to consider the CT the abdomen and pelvis    Clinically does not appear to be that of nephrolithiasis or urinary tract infection, remote possibility of diverticulitis, will need to wait on all the labs studies

## 2020-08-26 ENCOUNTER — PATIENT MESSAGE (OUTPATIENT)
Dept: INTERNAL MEDICINE | Facility: CLINIC | Age: 75
End: 2020-08-26

## 2020-08-27 RX ORDER — SULFAMETHOXAZOLE AND TRIMETHOPRIM 800; 160 MG/1; MG/1
1 TABLET ORAL 2 TIMES DAILY
Qty: 20 TABLET | Refills: 0 | Status: SHIPPED | OUTPATIENT
Start: 2020-08-27 | End: 2020-09-06

## 2020-08-27 RX ORDER — SULFAMETHOXAZOLE AND TRIMETHOPRIM 800; 160 MG/1; MG/1
1 TABLET ORAL 2 TIMES DAILY
Qty: 10 TABLET | Refills: 0 | Status: SHIPPED | OUTPATIENT
Start: 2020-08-27 | End: 2020-08-27

## 2020-08-27 RX ORDER — MUPIROCIN 20 MG/G
OINTMENT TOPICAL 2 TIMES DAILY
Qty: 15 G | Refills: 0 | Status: SHIPPED | OUTPATIENT
Start: 2020-08-27 | End: 2021-01-07

## 2020-08-28 ENCOUNTER — OFFICE VISIT (OUTPATIENT)
Dept: INTERNAL MEDICINE | Facility: CLINIC | Age: 75
End: 2020-08-28
Payer: MEDICARE

## 2020-08-28 ENCOUNTER — PATIENT OUTREACH (OUTPATIENT)
Dept: ADMINISTRATIVE | Facility: HOSPITAL | Age: 75
End: 2020-08-28

## 2020-08-28 VITALS
OXYGEN SATURATION: 98 % | WEIGHT: 315 LBS | BODY MASS INDEX: 42.66 KG/M2 | HEIGHT: 72 IN | SYSTOLIC BLOOD PRESSURE: 134 MMHG | DIASTOLIC BLOOD PRESSURE: 88 MMHG | HEART RATE: 72 BPM

## 2020-08-28 DIAGNOSIS — L03.115 BILATERAL LOWER LEG CELLULITIS: Primary | ICD-10-CM

## 2020-08-28 DIAGNOSIS — E78.5 HYPERLIPIDEMIA, UNSPECIFIED HYPERLIPIDEMIA TYPE: Primary | ICD-10-CM

## 2020-08-28 DIAGNOSIS — L03.116 BILATERAL LOWER LEG CELLULITIS: Primary | ICD-10-CM

## 2020-08-28 DIAGNOSIS — R60.0 BILATERAL LEG EDEMA: ICD-10-CM

## 2020-08-28 PROCEDURE — 3008F BODY MASS INDEX DOCD: CPT | Mod: HCNC,CPTII,S$GLB, | Performed by: INTERNAL MEDICINE

## 2020-08-28 PROCEDURE — 1159F MED LIST DOCD IN RCRD: CPT | Mod: HCNC,S$GLB,, | Performed by: INTERNAL MEDICINE

## 2020-08-28 PROCEDURE — 1159F PR MEDICATION LIST DOCUMENTED IN MEDICAL RECORD: ICD-10-PCS | Mod: HCNC,S$GLB,, | Performed by: INTERNAL MEDICINE

## 2020-08-28 PROCEDURE — 1126F PR PAIN SEVERITY QUANTIFIED, NO PAIN PRESENT: ICD-10-PCS | Mod: HCNC,S$GLB,, | Performed by: INTERNAL MEDICINE

## 2020-08-28 PROCEDURE — 99999 PR PBB SHADOW E&M-EST. PATIENT-LVL IV: CPT | Mod: PBBFAC,HCNC,, | Performed by: INTERNAL MEDICINE

## 2020-08-28 PROCEDURE — 3008F PR BODY MASS INDEX (BMI) DOCUMENTED: ICD-10-PCS | Mod: HCNC,CPTII,S$GLB, | Performed by: INTERNAL MEDICINE

## 2020-08-28 PROCEDURE — 1101F PT FALLS ASSESS-DOCD LE1/YR: CPT | Mod: HCNC,CPTII,S$GLB, | Performed by: INTERNAL MEDICINE

## 2020-08-28 PROCEDURE — 1126F AMNT PAIN NOTED NONE PRSNT: CPT | Mod: HCNC,S$GLB,, | Performed by: INTERNAL MEDICINE

## 2020-08-28 PROCEDURE — 3075F SYST BP GE 130 - 139MM HG: CPT | Mod: HCNC,CPTII,S$GLB, | Performed by: INTERNAL MEDICINE

## 2020-08-28 PROCEDURE — 3075F PR MOST RECENT SYSTOLIC BLOOD PRESS GE 130-139MM HG: ICD-10-PCS | Mod: HCNC,CPTII,S$GLB, | Performed by: INTERNAL MEDICINE

## 2020-08-28 PROCEDURE — 3079F DIAST BP 80-89 MM HG: CPT | Mod: HCNC,CPTII,S$GLB, | Performed by: INTERNAL MEDICINE

## 2020-08-28 PROCEDURE — 96372 THER/PROPH/DIAG INJ SC/IM: CPT | Mod: HCNC,S$GLB,, | Performed by: INTERNAL MEDICINE

## 2020-08-28 PROCEDURE — 99999 PR PBB SHADOW E&M-EST. PATIENT-LVL IV: ICD-10-PCS | Mod: PBBFAC,HCNC,, | Performed by: INTERNAL MEDICINE

## 2020-08-28 PROCEDURE — 1101F PR PT FALLS ASSESS DOC 0-1 FALLS W/OUT INJ PAST YR: ICD-10-PCS | Mod: HCNC,CPTII,S$GLB, | Performed by: INTERNAL MEDICINE

## 2020-08-28 PROCEDURE — 3079F PR MOST RECENT DIASTOLIC BLOOD PRESSURE 80-89 MM HG: ICD-10-PCS | Mod: HCNC,CPTII,S$GLB, | Performed by: INTERNAL MEDICINE

## 2020-08-28 PROCEDURE — 96372 PR INJECTION,THERAP/PROPH/DIAG2ST, IM OR SUBCUT: ICD-10-PCS | Mod: HCNC,S$GLB,, | Performed by: INTERNAL MEDICINE

## 2020-08-28 PROCEDURE — 99214 OFFICE O/P EST MOD 30 MIN: CPT | Mod: 25,HCNC,S$GLB, | Performed by: INTERNAL MEDICINE

## 2020-08-28 PROCEDURE — 99214 PR OFFICE/OUTPT VISIT, EST, LEVL IV, 30-39 MIN: ICD-10-PCS | Mod: 25,HCNC,S$GLB, | Performed by: INTERNAL MEDICINE

## 2020-08-28 RX ORDER — FUROSEMIDE 20 MG/1
20 TABLET ORAL DAILY
Qty: 14 TABLET | Refills: 0 | Status: SHIPPED | OUTPATIENT
Start: 2020-08-28 | End: 2020-09-11

## 2020-08-28 RX ORDER — LISINOPRIL 20 MG/1
20 TABLET ORAL DAILY
Qty: 14 TABLET | Refills: 0 | Status: SHIPPED | OUTPATIENT
Start: 2020-08-28 | End: 2020-09-11

## 2020-08-28 RX ORDER — CEFTRIAXONE 1 G/1
1 INJECTION, POWDER, FOR SOLUTION INTRAMUSCULAR; INTRAVENOUS
Status: COMPLETED | OUTPATIENT
Start: 2020-08-28 | End: 2020-08-28

## 2020-08-28 RX ADMIN — CEFTRIAXONE 1 G: 1 INJECTION, POWDER, FOR SOLUTION INTRAMUSCULAR; INTRAVENOUS at 12:08

## 2020-08-28 NOTE — PROGRESS NOTES
75-year-old male, who was recently seen August 24th for left lower quadrant pain which has resolved, started noticing that evening in swelling and redness involving both legs, anterior shin, worse on the left.  It looks like it has gotten worse since then, there is no fever, and yesterday Bactrim ds twice a day for 10 days was sent in.  He has had cellulitis associated with edema in the past    Examination  Weight 324  Pulse 72  Blood pressure 134/88  3+ edema involving both legs low bit more on the left diffuse erythema over the anterior shin worse on the left, with blisters vesicles seen on the left  2+ dorsalis pedis pulses    Impression  Bilateral leg cellulitis  Lower extremity edema    Plan  Rocephin 1 g IM today  For 2 weeks lisinopril 20 mg with furosemide 20 mg, to take the place of his current lisinopril-HCT 20-25  Continue with Bactrim

## 2020-09-09 ENCOUNTER — IMMUNIZATION (OUTPATIENT)
Dept: INTERNAL MEDICINE | Facility: CLINIC | Age: 75
End: 2020-09-09
Payer: MEDICARE

## 2020-09-09 ENCOUNTER — OFFICE VISIT (OUTPATIENT)
Dept: INTERNAL MEDICINE | Facility: CLINIC | Age: 75
End: 2020-09-09
Payer: MEDICARE

## 2020-09-09 VITALS
HEIGHT: 72 IN | SYSTOLIC BLOOD PRESSURE: 130 MMHG | OXYGEN SATURATION: 96 % | HEART RATE: 78 BPM | WEIGHT: 315 LBS | DIASTOLIC BLOOD PRESSURE: 76 MMHG | BODY MASS INDEX: 42.66 KG/M2

## 2020-09-09 DIAGNOSIS — R60.0 LOWER EXTREMITY EDEMA: ICD-10-CM

## 2020-09-09 DIAGNOSIS — L03.116 CELLULITIS OF LEFT LOWER EXTREMITY: Primary | ICD-10-CM

## 2020-09-09 DIAGNOSIS — R01.1 CARDIAC MURMUR: ICD-10-CM

## 2020-09-09 PROCEDURE — 3008F PR BODY MASS INDEX (BMI) DOCUMENTED: ICD-10-PCS | Mod: HCNC,CPTII,S$GLB, | Performed by: INTERNAL MEDICINE

## 2020-09-09 PROCEDURE — 3078F PR MOST RECENT DIASTOLIC BLOOD PRESSURE < 80 MM HG: ICD-10-PCS | Mod: HCNC,CPTII,S$GLB, | Performed by: INTERNAL MEDICINE

## 2020-09-09 PROCEDURE — 3078F DIAST BP <80 MM HG: CPT | Mod: HCNC,CPTII,S$GLB, | Performed by: INTERNAL MEDICINE

## 2020-09-09 PROCEDURE — 1126F AMNT PAIN NOTED NONE PRSNT: CPT | Mod: HCNC,S$GLB,, | Performed by: INTERNAL MEDICINE

## 2020-09-09 PROCEDURE — 99214 OFFICE O/P EST MOD 30 MIN: CPT | Mod: 25,HCNC,S$GLB, | Performed by: INTERNAL MEDICINE

## 2020-09-09 PROCEDURE — 3008F BODY MASS INDEX DOCD: CPT | Mod: HCNC,CPTII,S$GLB, | Performed by: INTERNAL MEDICINE

## 2020-09-09 PROCEDURE — G0008 ADMIN INFLUENZA VIRUS VAC: HCPCS | Mod: HCNC,S$GLB,, | Performed by: INTERNAL MEDICINE

## 2020-09-09 PROCEDURE — 1101F PR PT FALLS ASSESS DOC 0-1 FALLS W/OUT INJ PAST YR: ICD-10-PCS | Mod: HCNC,CPTII,S$GLB, | Performed by: INTERNAL MEDICINE

## 2020-09-09 PROCEDURE — 90694 VACC AIIV4 NO PRSRV 0.5ML IM: CPT | Mod: HCNC,S$GLB,, | Performed by: INTERNAL MEDICINE

## 2020-09-09 PROCEDURE — 1126F PR PAIN SEVERITY QUANTIFIED, NO PAIN PRESENT: ICD-10-PCS | Mod: HCNC,S$GLB,, | Performed by: INTERNAL MEDICINE

## 2020-09-09 PROCEDURE — 1159F PR MEDICATION LIST DOCUMENTED IN MEDICAL RECORD: ICD-10-PCS | Mod: HCNC,S$GLB,, | Performed by: INTERNAL MEDICINE

## 2020-09-09 PROCEDURE — 99214 PR OFFICE/OUTPT VISIT, EST, LEVL IV, 30-39 MIN: ICD-10-PCS | Mod: 25,HCNC,S$GLB, | Performed by: INTERNAL MEDICINE

## 2020-09-09 PROCEDURE — 3075F PR MOST RECENT SYSTOLIC BLOOD PRESS GE 130-139MM HG: ICD-10-PCS | Mod: HCNC,CPTII,S$GLB, | Performed by: INTERNAL MEDICINE

## 2020-09-09 PROCEDURE — 1101F PT FALLS ASSESS-DOCD LE1/YR: CPT | Mod: HCNC,CPTII,S$GLB, | Performed by: INTERNAL MEDICINE

## 2020-09-09 PROCEDURE — 99999 PR PBB SHADOW E&M-EST. PATIENT-LVL IV: CPT | Mod: PBBFAC,HCNC,, | Performed by: INTERNAL MEDICINE

## 2020-09-09 PROCEDURE — 90694 FLU VACCINE - QUADRIVALENT - ADJUVANTED: ICD-10-PCS | Mod: HCNC,S$GLB,, | Performed by: INTERNAL MEDICINE

## 2020-09-09 PROCEDURE — G0008 PR ADMIN INFLUENZA VIRUS VAC: ICD-10-PCS | Mod: HCNC,S$GLB,, | Performed by: INTERNAL MEDICINE

## 2020-09-09 PROCEDURE — 3075F SYST BP GE 130 - 139MM HG: CPT | Mod: HCNC,CPTII,S$GLB, | Performed by: INTERNAL MEDICINE

## 2020-09-09 PROCEDURE — 99999 PR PBB SHADOW E&M-EST. PATIENT-LVL IV: ICD-10-PCS | Mod: PBBFAC,HCNC,, | Performed by: INTERNAL MEDICINE

## 2020-09-09 PROCEDURE — 1159F MED LIST DOCD IN RCRD: CPT | Mod: HCNC,S$GLB,, | Performed by: INTERNAL MEDICINE

## 2020-09-09 RX ORDER — LISINOPRIL 20 MG/1
20 TABLET ORAL DAILY
Qty: 7 TABLET | Refills: 0 | Status: SHIPPED | OUTPATIENT
Start: 2020-09-09 | End: 2020-09-16

## 2020-09-09 RX ORDER — FUROSEMIDE 20 MG/1
20 TABLET ORAL DAILY
Qty: 90 TABLET | Refills: 1 | Status: SHIPPED | OUTPATIENT
Start: 2020-09-09 | End: 2021-05-29

## 2020-09-09 RX ORDER — FUROSEMIDE 20 MG/1
20 TABLET ORAL DAILY
Qty: 7 TABLET | Refills: 0 | Status: SHIPPED | OUTPATIENT
Start: 2020-09-09 | End: 2020-09-16

## 2020-09-09 RX ORDER — METOLAZONE 5 MG/1
5 TABLET ORAL DAILY
Qty: 5 TABLET | Refills: 0 | Status: SHIPPED | OUTPATIENT
Start: 2020-09-09 | End: 2020-09-15 | Stop reason: SDUPTHER

## 2020-09-09 RX ORDER — SULFAMETHOXAZOLE AND TRIMETHOPRIM 800; 160 MG/1; MG/1
1 TABLET ORAL 2 TIMES DAILY
Qty: 28 TABLET | Refills: 0 | Status: SHIPPED | OUTPATIENT
Start: 2020-09-09 | End: 2020-09-15

## 2020-09-09 RX ORDER — LISINOPRIL 20 MG/1
20 TABLET ORAL DAILY
Qty: 90 TABLET | Refills: 1 | Status: SHIPPED | OUTPATIENT
Start: 2020-09-09 | End: 2021-03-23 | Stop reason: SDUPTHER

## 2020-09-09 NOTE — PROGRESS NOTES
PAST MEDICAL HISTORY:  Hypertension.  Hyperlipidemia.  Type 2 diabetes.  Complex sleep apnea with the use of CPAP  Dementia  Chronic lower extremity edema with venous insufficiency.  Morbid obesity.  Restless leg syndrome.  Right knee chondroplasty  Adenomatous colon polyp in 2002.  AAA, Endovascular stetnt     SOCIAL HISTORY:  Tobacco use, none.  Alcohol use, none.     MEDICATIONS:  Lisinopril 20 mg  Lasix 20 mg   Metformin 500 mg twice a day.  Pravastatin 20 mg a day.  Vitamin D.  [id number] mg b.i.d  Lamictal 100 mg b.i.d.          75-year-old male  It is noted that his left lower extremity as become more swollen and erythematous again  He was seen by me on August 28th for cellulitis and edema of the left lower extremity, he has had recurrent cellulitis in this leg  The lisinopril a CT was changed to lisinopril 20 mg is Lasix 20 mg  Also Bactrim DS twice a day was sent in but by mistake and unfortunately was only sent in for 5 days  The wife noted that there was improvement after 5 days but then a few days ago started noticing the erythema and swelling  Reports no fever chills    Examination  Weight 326 lb  Pulse 72  Blood pressure 120/72  Heart regular rate and rhythm with 2/6 systolic murmur  Right lower extremity   1+ edema, venous stasis changes  Left lower extremity  2-3+ edema, diffuse erythema with vesicles    want to touch and erythema extends to the foot      Impression  Lower extremity cellulitis  Lower extremity venous insufficiency with venous stasis  Cardiac murmur    Plan  2D echo with Doppler  Bactrim ds twice a day for 2 weeks  Metolazone 5 mg once a day for 5 days  Continue with lisinopril at 20 mg in Lasix at 20 mg

## 2020-09-15 ENCOUNTER — OFFICE VISIT (OUTPATIENT)
Dept: INTERNAL MEDICINE | Facility: CLINIC | Age: 75
End: 2020-09-15
Payer: MEDICARE

## 2020-09-15 DIAGNOSIS — L03.116 LEFT LEG CELLULITIS: Primary | ICD-10-CM

## 2020-09-15 DIAGNOSIS — S80.811A ABRASION OF ANTERIOR RIGHT LOWER LEG, INITIAL ENCOUNTER: ICD-10-CM

## 2020-09-15 DIAGNOSIS — I87.2 CHRONIC VENOUS INSUFFICIENCY OF LOWER EXTREMITY: ICD-10-CM

## 2020-09-15 PROCEDURE — 99499 UNLISTED E&M SERVICE: CPT | Mod: HCNC,95,, | Performed by: INTERNAL MEDICINE

## 2020-09-15 PROCEDURE — 99499 NO LOS: ICD-10-PCS | Mod: HCNC,95,, | Performed by: INTERNAL MEDICINE

## 2020-09-15 PROCEDURE — 1159F PR MEDICATION LIST DOCUMENTED IN MEDICAL RECORD: ICD-10-PCS | Mod: HCNC,95,, | Performed by: INTERNAL MEDICINE

## 2020-09-15 PROCEDURE — 1101F PT FALLS ASSESS-DOCD LE1/YR: CPT | Mod: HCNC,CPTII,95, | Performed by: INTERNAL MEDICINE

## 2020-09-15 PROCEDURE — 1159F MED LIST DOCD IN RCRD: CPT | Mod: HCNC,95,, | Performed by: INTERNAL MEDICINE

## 2020-09-15 PROCEDURE — 1101F PR PT FALLS ASSESS DOC 0-1 FALLS W/OUT INJ PAST YR: ICD-10-PCS | Mod: HCNC,CPTII,95, | Performed by: INTERNAL MEDICINE

## 2020-09-15 RX ORDER — HONEY 100 %
1 PASTE (ML) TOPICAL DAILY
Qty: 1 TUBE | Refills: 1 | Status: SHIPPED | OUTPATIENT
Start: 2020-09-15 | End: 2021-01-07

## 2020-09-15 RX ORDER — METOLAZONE 5 MG/1
5 TABLET ORAL DAILY
Qty: 5 TABLET | Refills: 0 | Status: SHIPPED | OUTPATIENT
Start: 2020-09-15 | End: 2020-09-26 | Stop reason: SDUPTHER

## 2020-09-15 RX ORDER — CLINDAMYCIN HYDROCHLORIDE 300 MG/1
300 CAPSULE ORAL 3 TIMES DAILY
Qty: 30 CAPSULE | Refills: 0 | Status: SHIPPED | OUTPATIENT
Start: 2020-09-15 | End: 2020-09-25 | Stop reason: SDUPTHER

## 2020-09-15 NOTE — PROGRESS NOTES
Established Patient - Audio Only Telehealth Visit     The patient location is:  Home  The chief complaint leading to consultation is:  Cellulitis left leg  Visit type: Virtual visit with audio only (telephone)  Total time spent with patient:  15 min       The reason for the audio only service rather than synchronous audio and video virtual visit was related to technical difficulties or patient preference/necessity.     Each patient to whom I provide medical services by telemedicine is:  (1) informed of the relationship between the physician and patient and the respective role of any other health care provider with respect to management of the patient; and (2) notified that they may decline to receive medical services by telemedicine and may withdraw from such care at any time. Patient verbally consented to receive this service via voice-only telephone call.       HPI:  Follow-up from his visit with me on September 9th, in which he was seen for cellulitis and edema involving left lower extremity along with edema of the right lower extremity.  From the visit Bactrim ds twice a day for 10 days was restarted and metolazone was sent in to take along with his usual Lasix medication.  The wife feels that the degree of swelling has improved but the degree of erythema involving the left leg has not, there is still vesicles which there is drainage and, and when touched upon he feels some pain or discomfort.  Reports no fever chills and the wife does not feel that he does not need be admitted for this.  In couple days he is coming in to do a 2D echo with Doppler to evaluate cardiac murmur       Assessment and plan:  Persistent cellulitis left lower extremity associated with venous insufficiency left leg    Plan  Stop Bactrim  Clindamycin mg 3 times a day for 10 days  Take probiotic such as Culturelle  For another course of culture L5 mg once a day 5 days  Refill a previous prescription for many honey 80% gel to apply once a  day                        This service was not originating from a related E/M service provided within the previous 7 days nor will  to an E/M service or procedure within the next 24 hours or my soonest available appointment.  Prevailing standard of care was able to be met in this audio-only visit.

## 2020-09-21 ENCOUNTER — TELEPHONE (OUTPATIENT)
Dept: NEUROLOGY | Facility: CLINIC | Age: 75
End: 2020-09-21

## 2020-09-21 DIAGNOSIS — G30.9 ALZHEIMER'S DEMENTIA WITH BEHAVIORAL DISTURBANCE, UNSPECIFIED TIMING OF DEMENTIA ONSET: Primary | ICD-10-CM

## 2020-09-21 DIAGNOSIS — F02.81 ALZHEIMER'S DEMENTIA WITH BEHAVIORAL DISTURBANCE, UNSPECIFIED TIMING OF DEMENTIA ONSET: Primary | ICD-10-CM

## 2020-09-21 RX ORDER — QUETIAPINE FUMARATE 25 MG/1
25 TABLET, FILM COATED ORAL DAILY
Qty: 30 TABLET | Refills: 3 | Status: SHIPPED | OUTPATIENT
Start: 2020-09-21 | End: 2021-01-07

## 2020-09-21 NOTE — TELEPHONE ENCOUNTER
----- Message from Verenice Peoples sent at 9/21/2020  2:43 PM CDT -----  Regarding: pt advice  Contact: pt wife [phone]  Pt wife Sari is calling regarding the pt's significant changes in behavior. Please assist pt wife. [phone]
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
## 2020-09-22 ENCOUNTER — PATIENT MESSAGE (OUTPATIENT)
Dept: NEUROLOGY | Facility: CLINIC | Age: 75
End: 2020-09-22

## 2020-09-23 ENCOUNTER — PATIENT MESSAGE (OUTPATIENT)
Dept: INTERNAL MEDICINE | Facility: CLINIC | Age: 75
End: 2020-09-23

## 2020-09-23 ENCOUNTER — PATIENT MESSAGE (OUTPATIENT)
Dept: NEUROLOGY | Facility: CLINIC | Age: 75
End: 2020-09-23

## 2020-09-23 ENCOUNTER — DOCUMENTATION ONLY (OUTPATIENT)
Dept: INTERNAL MEDICINE | Facility: CLINIC | Age: 75
End: 2020-09-23

## 2020-09-23 ENCOUNTER — HOSPITAL ENCOUNTER (OUTPATIENT)
Dept: VASCULAR SURGERY | Facility: CLINIC | Age: 75
Discharge: HOME | End: 2020-09-23
Payer: MEDICARE

## 2020-09-23 ENCOUNTER — PATIENT OUTREACH (OUTPATIENT)
Dept: ADMINISTRATIVE | Facility: OTHER | Age: 75
End: 2020-09-23

## 2020-09-23 DIAGNOSIS — R60.9 SWELLING: ICD-10-CM

## 2020-09-23 DIAGNOSIS — M79.605 PAIN OF LEFT LOWER EXTREMITY: ICD-10-CM

## 2020-09-23 DIAGNOSIS — E11.9 TYPE 2 DIABETES MELLITUS WITHOUT COMPLICATION, WITHOUT LONG-TERM CURRENT USE OF INSULIN: ICD-10-CM

## 2020-09-23 DIAGNOSIS — L03.90 CHRONIC CELLULITIS: Primary | ICD-10-CM

## 2020-09-23 DIAGNOSIS — I87.302 STASIS EDEMA OF LEFT LOWER EXTREMITY: ICD-10-CM

## 2020-09-23 DIAGNOSIS — I87.302 STASIS EDEMA OF LEFT LOWER EXTREMITY: Primary | ICD-10-CM

## 2020-09-23 PROCEDURE — 93971 PR US DUPLEX, UPPER OR LOWER EXT VENOUS,UNILAT OR LTD: ICD-10-PCS | Mod: HCNC,S$GLB,, | Performed by: SURGERY

## 2020-09-23 PROCEDURE — 93971 EXTREMITY STUDY: CPT | Mod: HCNC,S$GLB,, | Performed by: SURGERY

## 2020-09-23 NOTE — PROGRESS NOTES
LINKS immunization registry not responding  Care Everywhere updated  Health Maintenance updated  Chart reviewed for overdue Proactive [facility] ([facility]) health maintenance testing (CRS, Breast Ca, Diabetic Eye Exam)   Orders entered:N/A

## 2020-09-25 ENCOUNTER — TELEPHONE (OUTPATIENT)
Dept: NEUROLOGY | Facility: CLINIC | Age: 75
End: 2020-09-25

## 2020-09-25 ENCOUNTER — LAB VISIT (OUTPATIENT)
Dept: LAB | Facility: HOSPITAL | Age: 75
End: 2020-09-25
Attending: INTERNAL MEDICINE
Payer: MEDICARE

## 2020-09-25 ENCOUNTER — PATIENT MESSAGE (OUTPATIENT)
Dept: INTERNAL MEDICINE | Facility: CLINIC | Age: 75
End: 2020-09-25

## 2020-09-25 ENCOUNTER — OFFICE VISIT (OUTPATIENT)
Dept: INFECTIOUS DISEASES | Facility: CLINIC | Age: 75
End: 2020-09-25
Payer: MEDICARE

## 2020-09-25 VITALS
WEIGHT: 315 LBS | TEMPERATURE: 98 F | HEART RATE: 74 BPM | SYSTOLIC BLOOD PRESSURE: 141 MMHG | DIASTOLIC BLOOD PRESSURE: 71 MMHG | BODY MASS INDEX: 42.85 KG/M2

## 2020-09-25 DIAGNOSIS — L03.90 CHRONIC CELLULITIS: ICD-10-CM

## 2020-09-25 DIAGNOSIS — E11.9 TYPE 2 DIABETES MELLITUS WITHOUT COMPLICATION, WITHOUT LONG-TERM CURRENT USE OF INSULIN: ICD-10-CM

## 2020-09-25 DIAGNOSIS — I87.302 STASIS EDEMA OF LEFT LOWER EXTREMITY: ICD-10-CM

## 2020-09-25 DIAGNOSIS — M79.605 PAIN OF LEFT LOWER EXTREMITY: ICD-10-CM

## 2020-09-25 DIAGNOSIS — L03.90 CHRONIC CELLULITIS: Primary | ICD-10-CM

## 2020-09-25 DIAGNOSIS — E66.01 CLASS 3 SEVERE OBESITY WITH BODY MASS INDEX (BMI) OF 40.0 TO 44.9 IN ADULT, UNSPECIFIED OBESITY TYPE, UNSPECIFIED WHETHER SERIOUS COMORBIDITY PRESENT: ICD-10-CM

## 2020-09-25 DIAGNOSIS — I87.2 VENOUS STASIS DERMATITIS OF BOTH LOWER EXTREMITIES: ICD-10-CM

## 2020-09-25 LAB
ANION GAP SERPL CALC-SCNC: 8 MMOL/L (ref 8–16)
BASOPHILS # BLD AUTO: 0.04 K/UL (ref 0–0.2)
BASOPHILS NFR BLD: 0.6 % (ref 0–1.9)
BUN SERPL-MCNC: 29 MG/DL (ref 8–23)
CALCIUM SERPL-MCNC: 8.9 MG/DL (ref 8.7–10.5)
CHLORIDE SERPL-SCNC: 104 MMOL/L (ref 95–110)
CO2 SERPL-SCNC: 28 MMOL/L (ref 23–29)
CREAT SERPL-MCNC: 1.2 MG/DL (ref 0.5–1.4)
DIFFERENTIAL METHOD: ABNORMAL
EOSINOPHIL # BLD AUTO: 0.1 K/UL (ref 0–0.5)
EOSINOPHIL NFR BLD: 2.1 % (ref 0–8)
ERYTHROCYTE [DISTWIDTH] IN BLOOD BY AUTOMATED COUNT: 15.2 % (ref 11.5–14.5)
EST. GFR  (AFRICAN AMERICAN): >60 ML/MIN/1.73 M^2
EST. GFR  (NON AFRICAN AMERICAN): 59.2 ML/MIN/1.73 M^2
ESTIMATED AVG GLUCOSE: 131 MG/DL (ref 68–131)
GLUCOSE SERPL-MCNC: 105 MG/DL (ref 70–110)
HBA1C MFR BLD HPLC: 6.2 % (ref 4–5.6)
HCT VFR BLD AUTO: 48.4 % (ref 40–54)
HGB BLD-MCNC: 15.3 G/DL (ref 14–18)
IMM GRANULOCYTES # BLD AUTO: 0.02 K/UL (ref 0–0.04)
IMM GRANULOCYTES NFR BLD AUTO: 0.3 % (ref 0–0.5)
LYMPHOCYTES # BLD AUTO: 2.3 K/UL (ref 1–4.8)
LYMPHOCYTES NFR BLD: 33 % (ref 18–48)
MCH RBC QN AUTO: 28.2 PG (ref 27–31)
MCHC RBC AUTO-ENTMCNC: 31.6 G/DL (ref 32–36)
MCV RBC AUTO: 89 FL (ref 82–98)
MONOCYTES # BLD AUTO: 0.7 K/UL (ref 0.3–1)
MONOCYTES NFR BLD: 9.7 % (ref 4–15)
NEUTROPHILS # BLD AUTO: 3.7 K/UL (ref 1.8–7.7)
NEUTROPHILS NFR BLD: 54.3 % (ref 38–73)
NRBC BLD-RTO: 0 /100 WBC
PLATELET # BLD AUTO: 241 K/UL (ref 150–350)
PMV BLD AUTO: 9.9 FL (ref 9.2–12.9)
POTASSIUM SERPL-SCNC: 4.1 MMOL/L (ref 3.5–5.1)
RBC # BLD AUTO: 5.43 M/UL (ref 4.6–6.2)
SODIUM SERPL-SCNC: 140 MMOL/L (ref 136–145)
WBC # BLD AUTO: 6.81 K/UL (ref 3.9–12.7)

## 2020-09-25 PROCEDURE — 80048 BASIC METABOLIC PNL TOTAL CA: CPT | Mod: HCNC

## 2020-09-25 PROCEDURE — 36415 COLL VENOUS BLD VENIPUNCTURE: CPT | Mod: HCNC

## 2020-09-25 PROCEDURE — 99204 PR OFFICE/OUTPT VISIT, NEW, LEVL IV, 45-59 MIN: ICD-10-PCS | Mod: HCNC,S$GLB,, | Performed by: INTERNAL MEDICINE

## 2020-09-25 PROCEDURE — 99499 UNLISTED E&M SERVICE: CPT | Mod: HCNC,S$GLB,, | Performed by: INTERNAL MEDICINE

## 2020-09-25 PROCEDURE — 83036 HEMOGLOBIN GLYCOSYLATED A1C: CPT | Mod: HCNC

## 2020-09-25 PROCEDURE — 3008F PR BODY MASS INDEX (BMI) DOCUMENTED: ICD-10-PCS | Mod: HCNC,CPTII,S$GLB, | Performed by: INTERNAL MEDICINE

## 2020-09-25 PROCEDURE — 99999 PR PBB SHADOW E&M-EST. PATIENT-LVL III: ICD-10-PCS | Mod: PBBFAC,HCNC,, | Performed by: INTERNAL MEDICINE

## 2020-09-25 PROCEDURE — 85025 COMPLETE CBC W/AUTO DIFF WBC: CPT | Mod: HCNC

## 2020-09-25 PROCEDURE — 1101F PR PT FALLS ASSESS DOC 0-1 FALLS W/OUT INJ PAST YR: ICD-10-PCS | Mod: HCNC,CPTII,S$GLB, | Performed by: INTERNAL MEDICINE

## 2020-09-25 PROCEDURE — 3077F SYST BP >= 140 MM HG: CPT | Mod: HCNC,CPTII,S$GLB, | Performed by: INTERNAL MEDICINE

## 2020-09-25 PROCEDURE — 1159F PR MEDICATION LIST DOCUMENTED IN MEDICAL RECORD: ICD-10-PCS | Mod: HCNC,S$GLB,, | Performed by: INTERNAL MEDICINE

## 2020-09-25 PROCEDURE — 99499 RISK ADDL DX/OHS AUDIT: ICD-10-PCS | Mod: HCNC,S$GLB,, | Performed by: INTERNAL MEDICINE

## 2020-09-25 PROCEDURE — 1159F MED LIST DOCD IN RCRD: CPT | Mod: HCNC,S$GLB,, | Performed by: INTERNAL MEDICINE

## 2020-09-25 PROCEDURE — 1125F PR PAIN SEVERITY QUANTIFIED, PAIN PRESENT: ICD-10-PCS | Mod: HCNC,S$GLB,, | Performed by: INTERNAL MEDICINE

## 2020-09-25 PROCEDURE — 3008F BODY MASS INDEX DOCD: CPT | Mod: HCNC,CPTII,S$GLB, | Performed by: INTERNAL MEDICINE

## 2020-09-25 PROCEDURE — 3077F PR MOST RECENT SYSTOLIC BLOOD PRESSURE >= 140 MM HG: ICD-10-PCS | Mod: HCNC,CPTII,S$GLB, | Performed by: INTERNAL MEDICINE

## 2020-09-25 PROCEDURE — 3078F DIAST BP <80 MM HG: CPT | Mod: HCNC,CPTII,S$GLB, | Performed by: INTERNAL MEDICINE

## 2020-09-25 PROCEDURE — 99204 OFFICE O/P NEW MOD 45 MIN: CPT | Mod: HCNC,S$GLB,, | Performed by: INTERNAL MEDICINE

## 2020-09-25 PROCEDURE — 1125F AMNT PAIN NOTED PAIN PRSNT: CPT | Mod: HCNC,S$GLB,, | Performed by: INTERNAL MEDICINE

## 2020-09-25 PROCEDURE — 1101F PT FALLS ASSESS-DOCD LE1/YR: CPT | Mod: HCNC,CPTII,S$GLB, | Performed by: INTERNAL MEDICINE

## 2020-09-25 PROCEDURE — 99999 PR PBB SHADOW E&M-EST. PATIENT-LVL III: CPT | Mod: PBBFAC,HCNC,, | Performed by: INTERNAL MEDICINE

## 2020-09-25 PROCEDURE — 3078F PR MOST RECENT DIASTOLIC BLOOD PRESSURE < 80 MM HG: ICD-10-PCS | Mod: HCNC,CPTII,S$GLB, | Performed by: INTERNAL MEDICINE

## 2020-09-25 RX ORDER — CLINDAMYCIN HYDROCHLORIDE 150 MG/1
450 CAPSULE ORAL 4 TIMES DAILY
Qty: 108 CAPSULE | Refills: 0 | Status: SHIPPED | OUTPATIENT
Start: 2020-09-25 | End: 2020-10-04

## 2020-09-25 NOTE — LETTER
September 25, 2020      Artem Hood MD  [address]  [facility] [address]           [facility] - Infectious Disease 1st Fl  [address]  [facility] [address]  Phone: [phone]  Fax: [phone]          Patient: Jake Louie   MR Number: [id number]   YOB: 1945   Date of Visit: 9/25/2020       Dear Dr. Artem Hood:    Thank you for referring Jake Louie to me for evaluation. Attached you will find relevant portions of my assessment and plan of care.    If you have questions, please do not hesitate to call me. I look forward to following Jake Louie along with you.    Sincerely,    Estefany Price MD    Enclosure  CC:  No Recipients    If you would like to receive this communication electronically, please contact [email or web address] or [phone] to request more information on [employer] Link access.    For providers and/or their staff who would like to refer a patient to [facility], please contact us through our one-stop-shop provider referral line, [facility], at [phone].    If you feel you have received this communication in error or would no longer like to receive these types of communications, please e-mail [email or web address]

## 2020-09-25 NOTE — PROGRESS NOTES
INFECTIOUS DISEASE CLINIC  09/25/2020 1:45 PM    Subjective:      Chief Complaint: cellulitis    History of Present Illness:    Patient Jake Louie is a 75 y.o. male with obesity who presents today for evaluation of pain, swelling, redness of LLL. Reports chronic swelling in B/L lower legs with chronic lymphedema, but says the redness and tenderness is not normal. Denies f/c. Had DVT US which was negative dvt. Was given course of bactrim by PCP, no improvement. Now 5 days into course of clindamycin. Notes marked improvement in redness of leg. Of note, pt reports h/o orthopedic hardware placement in this leg in distant past. Keeping leg elevated sometimes. Denies compression hose use. Improvement in swelling with diuretics. Seeing wound care. Denies water exposures other than showers. Has cats at home, but denies them being exposed to wound      Review of Symptoms:  Constitutional: Denies fevers, chills, or weakness.  ENT: Denies dysphagia, nasal discharge, ear pain or discharge.  Cardiovascular: Denies chest pain, palpitations, orthopnea, or claudication.  Respiratory: Denies shortness of breath, cough, hemoptysis, or wheezing.  GI: Denies nausea/vomitting, hematochezia, melena, abd pain, or changes in appetite.  : Denies dysuria, incontinence, or hematuria.  Musculoskeletal: Denies joint pain or myalgias.  Skin/breast: Denies rashes, lumps, lesions, or discharge.  Neurologic: Denies headache, dizziness, vertigo, or paresthesias.    Past Medical History:   Diagnosis Date    Depression     HEARING LOSS     Hyperlipidemia     Hypertension     JUICE (obstructive sleep apnea)     Personal history of colonic polyps     Restless legs syndrome (RLS)     Sleep apnea        Past Surgical History:   Procedure Laterality Date    KNEE SURGERY      KNEE SURGERY      leg fx  repair       Family History   Problem Relation Age of Onset    Arthritis Mother     Hypertension Mother     Hearing loss Mother      Pneumonia Mother     Parkinsonism Father     Hypertension Brother     Colon cancer Brother     Hypertension Brother     Hypertension Brother        Social History     Socioeconomic History    Marital status:      Spouse name: Not on file    Number of children: Not on file    Years of education: Not on file    Highest education level: Not on file   Occupational History     Employer: OTHER   Social Needs    Financial resource strain: Not on file    Food insecurity     Worry: Not on file     Inability: Not on file    Transportation needs     Medical: Not on file     Non-medical: Not on file   Tobacco Use    Smoking status: Current Every Day Smoker     Packs/day: 0.50     Years: 50.00     Pack years: 25.00    Smokeless tobacco: Never Used   Substance and Sexual Activity    Alcohol use: No    Drug use: No    Sexual activity: Not on file   Lifestyle    Physical activity     Days per week: Not on file     Minutes per session: Not on file    Stress: Not on file   Relationships    Social connections     Talks on phone: Not on file     Gets together: Not on file     Attends Rastafarian service: Not on file     Active member of club or organization: Not on file     Attends meetings of clubs or organizations: Not on file     Relationship status: Not on file   Other Topics Concern    Not on file   Social History Narrative    Not on file       Review of patient's allergies indicates:  No Known Allergies      Objective:   BP (!) 141/71 (BP Location: Left arm, Patient Position: Sitting)   Pulse 74   Temp 98.4 °F (36.9 °C) (Oral)   Wt (!) 143.3 kg (315 lb 14.7 oz)   BMI 42.85 kg/m²     General: Afebrile, alert, comfortable, no acute distress. Central obesity  HEENT: PAIGE. EOMI, no scleral icterus.   Pulmonary: Non labored,clear to auscultation A/P/L. No wheezing, crackles, or rhonchi.  Cardiac: normal S1 & S2 w/o rubs/murmurs/gallops.   Abdominal: Non-tender, non-distended.  Extremities: Moves all  extremities x 4. Chronic venous stasis of BLE. L>R lower extremity edema. Flaking skin. No actively draining lesions. Area of erythema has receeded. Warm. Mildly tender to palpation, but no focal area of tenderness   Neurological:  Alert and oriented x 4.     Labs:  Glucose   Date Value Ref Range Status   08/24/2020 119 (H) 70 - 110 mg/dL Final   02/13/2020 124 (H) 70 - 110 mg/dL Final   12/26/2019 152 (H) 70 - 110 mg/dL Final     Calcium   Date Value Ref Range Status   08/24/2020 8.9 8.7 - 10.5 mg/dL Final   02/13/2020 8.6 (L) 8.7 - 10.5 mg/dL Final   12/26/2019 9.6 8.7 - 10.5 mg/dL Final     Albumin   Date Value Ref Range Status   12/26/2019 3.8 3.5 - 5.2 g/dL Final   11/23/2019 3.4 (L) 3.5 - 5.2 g/dL Final   02/11/2019 3.5 3.5 - 5.2 g/dL Final     Total Protein   Date Value Ref Range Status   12/26/2019 7.0 6.0 - 8.4 g/dL Final   11/23/2019 6.6 6.0 - 8.4 g/dL Final   02/11/2019 6.8 6.0 - 8.4 g/dL Final     Sodium   Date Value Ref Range Status   08/24/2020 140 136 - 145 mmol/L Final   02/13/2020 139 136 - 145 mmol/L Final   12/26/2019 140 136 - 145 mmol/L Final     Potassium   Date Value Ref Range Status   08/24/2020 4.1 3.5 - 5.1 mmol/L Final   02/13/2020 4.6 3.5 - 5.1 mmol/L Final   12/26/2019 3.8 3.5 - 5.1 mmol/L Final     CO2   Date Value Ref Range Status   08/24/2020 26 23 - 29 mmol/L Final   02/13/2020 25 23 - 29 mmol/L Final   12/26/2019 25 23 - 29 mmol/L Final     Chloride   Date Value Ref Range Status   08/24/2020 108 95 - 110 mmol/L Final   02/13/2020 106 95 - 110 mmol/L Final   12/26/2019 105 95 - 110 mmol/L Final     BUN, Bld   Date Value Ref Range Status   08/24/2020 18 8 - 23 mg/dL Final   02/13/2020 18 8 - 23 mg/dL Final   12/26/2019 22 8 - 23 mg/dL Final     Creatinine   Date Value Ref Range Status   08/24/2020 1.0 0.5 - 1.4 mg/dL Final   02/13/2020 1.1 0.5 - 1.4 mg/dL Final   12/26/2019 1.2 0.5 - 1.4 mg/dL Final     Alkaline Phosphatase   Date Value Ref Range Status   12/26/2019 90 55 - 135 U/L  Final   11/23/2019 87 55 - 135 U/L Final   02/11/2019 86 55 - 135 U/L Final     ALT   Date Value Ref Range Status   12/26/2019 45 (H) 10 - 44 U/L Final   11/23/2019 36 10 - 44 U/L Final   02/11/2019 31 10 - 44 U/L Final     AST   Date Value Ref Range Status   12/26/2019 19 10 - 40 U/L Final   11/23/2019 20 10 - 40 U/L Final     Comment:     *Result may be interfered by visible hemolysis   02/11/2019 18 10 - 40 U/L Final     Total Bilirubin   Date Value Ref Range Status   12/26/2019 0.4 0.1 - 1.0 mg/dL Final     Comment:     For infants and newborns, interpretation of results should be based  on gestational age, weight and in agreement with clinical  observations.  Premature Infant recommended reference ranges:  Up to 24 hours.............<8.0 mg/dL  Up to 48 hours............<12.0 mg/dL  3-5 days..................<15.0 mg/dL  6-29 days.................<15.0 mg/dL     11/23/2019 0.3 0.1 - 1.0 mg/dL Final     Comment:     For infants and newborns, interpretation of results should be based  on gestational age, weight and in agreement with clinical  observations.  Premature Infant recommended reference ranges:  Up to 24 hours.............<8.0 mg/dL  Up to 48 hours............<12.0 mg/dL  3-5 days..................<15.0 mg/dL  6-29 days.................<15.0 mg/dL     02/11/2019 0.5 0.1 - 1.0 mg/dL Final     Comment:     For infants and newborns, interpretation of results should be based  on gestational age, weight and in agreement with clinical  observations.  Premature Infant recommended reference ranges:  Up to 24 hours.............<8.0 mg/dL  Up to 48 hours............<12.0 mg/dL  3-5 days..................<15.0 mg/dL  6-29 days.................<15.0 mg/dL       WBC   Date Value Ref Range Status   08/24/2020 6.57 3.90 - 12.70 K/uL Final   02/13/2020 5.45 3.90 - 12.70 K/uL Final   12/26/2019 8.15 3.90 - 12.70 K/uL Final     Hemoglobin   Date Value Ref Range Status   08/24/2020 15.3 14.0 - 18.0 g/dL Final   02/13/2020  16.0 14.0 - 18.0 g/dL Final   12/26/2019 16.5 14.0 - 18.0 g/dL Final     Hematocrit   Date Value Ref Range Status   08/24/2020 48.0 40.0 - 54.0 % Final   02/13/2020 50.9 40.0 - 54.0 % Final   12/26/2019 50.0 40.0 - 54.0 % Final     Mean Corpuscular Volume   Date Value Ref Range Status   08/24/2020 89 82 - 98 fL Final   02/13/2020 90 82 - 98 fL Final   12/26/2019 87 82 - 98 fL Final     Platelets   Date Value Ref Range Status   08/24/2020 241 150 - 350 K/uL Final   02/13/2020 228 150 - 350 K/uL Final   12/26/2019 228 150 - 350 K/uL Final     Lab Results   Component Value Date    CHOL 168 07/13/2018    CHOL 174 05/17/2017    CHOL 211 (H) 11/25/2016     Lab Results   Component Value Date    HDL 32 (L) 07/13/2018    HDL 36 (L) 05/17/2017    HDL 33 (L) 11/25/2016     Lab Results   Component Value Date    LDLCALC 103.0 07/13/2018    LDLCALC 117.0 05/17/2017    LDLCALC 153.4 11/25/2016     Lab Results   Component Value Date    TRIG 165 (H) 07/13/2018    TRIG 105 05/17/2017    TRIG 123 11/25/2016     Lab Results   Component Value Date    CHOLHDL 19.0 (L) 07/13/2018    CHOLHDL 20.7 05/17/2017    CHOLHDL 15.6 (L) 11/25/2016     RPR   Date Value Ref Range Status   07/13/2018 Non-reactive Non-reactive Final     No results found for: QUANTIFERON    Medications:  Current Outpatient Medications on File Prior to Visit   Medication Sig Dispense Refill    albuterol (PROVENTIL/VENTOLIN HFA) 90 mcg/actuation inhaler Inhale 2 puffs into the lungs every 6 (six) hours as needed for Wheezing. 8 g 0    ascorbic acid (VITAMIN C) 500 MG tablet Take 500 mg by mouth once daily.      aspirin (ECOTRIN) 81 MG EC tablet Take 81 mg by mouth once daily.      clindamycin (CLEOCIN) 300 MG capsule Take 1 capsule (300 mg total) by mouth 3 (three) times daily. for 10 days 30 capsule 0    furosemide (LASIX) 20 MG tablet Take 1 tablet (20 mg total) by mouth once daily. 90 tablet 1    honey (MEDIHONEY, HONEY,) 80 % Gel Apply 1 application topically  once daily. 1 Tube 1    lamoTRIgine (LAMICTAL) 100 MG tablet TAKE 1 TABLET BY MOUTH TWICE DAILY 180 tablet 1    lisinopriL (PRINIVIL,ZESTRIL) 20 MG tablet Take 1 tablet (20 mg total) by mouth once daily. 90 tablet 1    memantine (NAMENDA) 10 MG Tab Take 1 tablet (10 mg total) by mouth 2 (two) times daily. 60 tablet 5    metFORMIN (GLUCOPHAGE) 500 MG tablet Take 1 tablet (500 mg total) by mouth 2 (two) times daily with meals. 180 tablet 0    metOLazone (ZAROXOLYN) 5 MG tablet Take 1 tablet (5 mg total) by mouth once daily. for 5 days 5 tablet 0    multivitamin (THERAGRAN) per tablet Take 1 tablet by mouth once daily.        mupirocin (BACTROBAN) 2 % ointment by Nasal route 2 (two) times daily. 15 g 0    pravastatin (PRAVACHOL) 20 MG tablet Take 1 tablet (20 mg total) by mouth once daily. 90 tablet 3    QUEtiapine (SEROQUEL) 25 MG Tab Take 1 tablet (25 mg total) by mouth once daily. 30 tablet 3    vitamin D 1000 units Tab Take 185 mg by mouth once daily.       No current facility-administered medications on file prior to visit.        Antibiotics:   Antibiotics (From admission, onward)    None          HIV: No components found for: HIV 1/2 AG/AB  Hepatitis C IgG: No components found for: HEPATITIS C  Syphilis:   RPR   Date Value Ref Range Status   07/13/2018 Non-reactive Non-reactive Final       Hepatitis A IgG: No components found for: HEPATITIS A IGG  Hepatitis Bc IgG: No components found for: HEPATITIS B CORE IGG  Hepatitis Bs IgG:  Quantiferon: No results found for: QUANTIFERON  VZV IgG: No components found for: VARICELLA IGG    No components found for: SEDIMENTATION RATE  No components found for: C-REACTIVE PROTEIN      Microbiology x 7d:   Microbiology Results (last 7 days)     ** No results found for the last 168 hours. **          Immunization History   Administered Date(s) Administered    Dtap, Unspecified Formulation 03/17/2016    Hepatitis A, Adult 09/21/2005    Hepatitis B, Adult 09/21/2005     Influenza 10/13/2010, 03/17/2016, 10/01/2017, 12/04/2018    Influenza (FLUAD) - Quadrivalent - Adjuvanted - PF *Preferred* (65+) 09/09/2020    Influenza - High Dose - PF (65 years and older) 10/05/2011, 11/19/2014, 11/01/2016, 02/04/2017, 12/26/2017, 12/05/2018, 10/01/2019    Influenza - Quadrivalent 10/31/2016    Influenza - Quadrivalent - PF *Preferred* (6 months and older) 10/13/2010    Pneumococcal Conjugate - 13 Valent 10/31/2016    Pneumococcal Polysaccharide - 23 Valent 02/18/2019    Td (Adult), Unspecified Formulation 09/21/2005    Tdap 03/17/2016, 01/28/2020    Zoster 04/20/2016         Assessment:     Chronic venous stasis  Cellulitis    Given warmth and increasing tenderness and redness, suspect he has a component of cellulitis; which seems to be improving on clindamycin. However, pt also does have chronic venous stasis, which is also likely contributing to picture. No systemic signs of infection. No fever, no leukocytosis. Cellulitis usually due to strep, which bactrim doesn't cover. Fortunately patient seems to be improving on clindamycin    Plan:     Continue clindamycin. Pt morbidly obese, BMI 43, so will increase dose. counseled on side effects. Encourage probiotic use while on antibiotics. Continue wound care and leg elevation. Counseled on need for weight loss. Counseled patient to let me know if symptoms worsen; if that happens could consider CT to r/o abscess and eval for fluid collection along hardware in that leg.       Estefany Price MD, MPH  Infectious Disease

## 2020-09-26 ENCOUNTER — PATIENT MESSAGE (OUTPATIENT)
Dept: INTERNAL MEDICINE | Facility: CLINIC | Age: 75
End: 2020-09-26

## 2020-09-26 RX ORDER — METOLAZONE 5 MG/1
5 TABLET ORAL DAILY
Qty: 5 TABLET | Refills: 0 | Status: SHIPPED | OUTPATIENT
Start: 2020-09-26 | End: 2020-11-12

## 2020-09-26 RX ORDER — METFORMIN HYDROCHLORIDE 1000 MG/1
1000 TABLET ORAL 2 TIMES DAILY WITH MEALS
Qty: 180 TABLET | Refills: 3 | Status: SHIPPED | OUTPATIENT
Start: 2020-09-26 | End: 2021-03-18 | Stop reason: SDUPTHER

## 2020-09-26 NOTE — PROGRESS NOTES
Recent lab studies reviewed    Hemoglobin A1c is down the 6.2 and will continue with the metformin dose now at a 1000 mg b.i.d.    He met with infectious disease an to continue with clindamycin but the dose was increased    This to some degree of swelling but looks little bit improve, the creatinine is stable at 1.2, because of such will again give another boost of metolazone 5 mg daily with his current medications including furosemide

## 2020-09-30 ENCOUNTER — OFFICE VISIT (OUTPATIENT)
Dept: INTERNAL MEDICINE | Facility: CLINIC | Age: 75
End: 2020-09-30
Payer: MEDICARE

## 2020-09-30 VITALS
WEIGHT: 315 LBS | OXYGEN SATURATION: 96 % | HEART RATE: 99 BPM | SYSTOLIC BLOOD PRESSURE: 130 MMHG | HEIGHT: 72 IN | BODY MASS INDEX: 42.66 KG/M2 | DIASTOLIC BLOOD PRESSURE: 88 MMHG

## 2020-09-30 DIAGNOSIS — I87.2 VENOUS STASIS DERMATITIS OF LEFT LOWER EXTREMITY: ICD-10-CM

## 2020-09-30 DIAGNOSIS — L03.116 CELLULITIS OF LEFT LOWER EXTREMITY: Primary | ICD-10-CM

## 2020-09-30 PROCEDURE — 99999 PR PBB SHADOW E&M-EST. PATIENT-LVL IV: ICD-10-PCS | Mod: PBBFAC,HCNC,, | Performed by: INTERNAL MEDICINE

## 2020-09-30 PROCEDURE — 1101F PR PT FALLS ASSESS DOC 0-1 FALLS W/OUT INJ PAST YR: ICD-10-PCS | Mod: HCNC,CPTII,S$GLB, | Performed by: INTERNAL MEDICINE

## 2020-09-30 PROCEDURE — 1125F AMNT PAIN NOTED PAIN PRSNT: CPT | Mod: HCNC,S$GLB,, | Performed by: INTERNAL MEDICINE

## 2020-09-30 PROCEDURE — 3079F PR MOST RECENT DIASTOLIC BLOOD PRESSURE 80-89 MM HG: ICD-10-PCS | Mod: HCNC,CPTII,S$GLB, | Performed by: INTERNAL MEDICINE

## 2020-09-30 PROCEDURE — 3075F PR MOST RECENT SYSTOLIC BLOOD PRESS GE 130-139MM HG: ICD-10-PCS | Mod: HCNC,CPTII,S$GLB, | Performed by: INTERNAL MEDICINE

## 2020-09-30 PROCEDURE — 3008F PR BODY MASS INDEX (BMI) DOCUMENTED: ICD-10-PCS | Mod: HCNC,CPTII,S$GLB, | Performed by: INTERNAL MEDICINE

## 2020-09-30 PROCEDURE — 3075F SYST BP GE 130 - 139MM HG: CPT | Mod: HCNC,CPTII,S$GLB, | Performed by: INTERNAL MEDICINE

## 2020-09-30 PROCEDURE — 99214 OFFICE O/P EST MOD 30 MIN: CPT | Mod: HCNC,S$GLB,, | Performed by: INTERNAL MEDICINE

## 2020-09-30 PROCEDURE — 3008F BODY MASS INDEX DOCD: CPT | Mod: HCNC,CPTII,S$GLB, | Performed by: INTERNAL MEDICINE

## 2020-09-30 PROCEDURE — 1159F PR MEDICATION LIST DOCUMENTED IN MEDICAL RECORD: ICD-10-PCS | Mod: HCNC,S$GLB,, | Performed by: INTERNAL MEDICINE

## 2020-09-30 PROCEDURE — 1159F MED LIST DOCD IN RCRD: CPT | Mod: HCNC,S$GLB,, | Performed by: INTERNAL MEDICINE

## 2020-09-30 PROCEDURE — 1125F PR PAIN SEVERITY QUANTIFIED, PAIN PRESENT: ICD-10-PCS | Mod: HCNC,S$GLB,, | Performed by: INTERNAL MEDICINE

## 2020-09-30 PROCEDURE — 1101F PT FALLS ASSESS-DOCD LE1/YR: CPT | Mod: HCNC,CPTII,S$GLB, | Performed by: INTERNAL MEDICINE

## 2020-09-30 PROCEDURE — 99214 PR OFFICE/OUTPT VISIT, EST, LEVL IV, 30-39 MIN: ICD-10-PCS | Mod: HCNC,S$GLB,, | Performed by: INTERNAL MEDICINE

## 2020-09-30 PROCEDURE — 99999 PR PBB SHADOW E&M-EST. PATIENT-LVL IV: CPT | Mod: PBBFAC,HCNC,, | Performed by: INTERNAL MEDICINE

## 2020-09-30 PROCEDURE — 3079F DIAST BP 80-89 MM HG: CPT | Mod: HCNC,CPTII,S$GLB, | Performed by: INTERNAL MEDICINE

## 2020-09-30 RX ORDER — BUMETANIDE 2 MG/1
2 TABLET ORAL DAILY
Qty: 7 TABLET | Refills: 0 | Status: SHIPPED | OUTPATIENT
Start: 2020-09-30 | End: 2020-11-12

## 2020-09-30 RX ORDER — TRIAMCINOLONE ACETONIDE 5 MG/G
OINTMENT TOPICAL 2 TIMES DAILY
Qty: 60 G | Refills: 1 | Status: SHIPPED | OUTPATIENT
Start: 2020-09-30 | End: 2021-01-07

## 2020-09-30 NOTE — PROGRESS NOTES
75-year-old male  Reassessment of the left lower extremity for which he has been treated for cellulitis and venous stasis dermatitis changes  Last week was seen by infectious disease in which the clindamycin medication was increased to 300 mg 3 times a day to 450 mg 4 times a day  Also started on May 26 another course of metolazone 5 mg once a day for 5 days was prescribed, along with the Lasix    The wife is noticing some blister formation over the posterior lateral aspect, although this seems to be improvement in overall edema involving legs as well as erythema, still complains of pain around leg.  This is leg in which he has had a previous fracture      Examination  Weight 316  Pulse 98  Blood pressure 130/82  The right lower extremity is hyper pigmented changes along with 1+ edema  Left lower extremity there is 2+ edema along with surrounding hyperemia involving most of leg except the upper medial aspect, the skin is avail E scaly and dry, there is a couple of vesicles seen for which clear fluid was able to be expressed  2+ dorsalis pedis pulses can be palpated    Impression  Cellulitis left lower extremity  Venous stasis dermatitis left lower extremity    Plan  Add Bumex 2 mg once a day for 1 week  Diprolene ointment was prescribed    Set up CT of the leg make sure there is no evidence of any abscess

## 2020-10-07 ENCOUNTER — PATIENT MESSAGE (OUTPATIENT)
Dept: ADMINISTRATIVE | Facility: HOSPITAL | Age: 75
End: 2020-10-07

## 2020-10-12 ENCOUNTER — TELEPHONE (OUTPATIENT)
Dept: DERMATOLOGY | Facility: CLINIC | Age: 75
End: 2020-10-12

## 2020-10-12 NOTE — TELEPHONE ENCOUNTER
----- Message from Pippa Monson sent at 10/12/2020  3:50 PM CDT -----  Contact: Pt  Pt wife called to schedule an appt to have a mole on forehead looked at. Pt wife asked for a call back

## 2020-10-13 ENCOUNTER — TELEPHONE (OUTPATIENT)
Dept: DERMATOLOGY | Facility: CLINIC | Age: 75
End: 2020-10-13

## 2020-10-13 NOTE — TELEPHONE ENCOUNTER
----- Message from Catie Lau MA sent at 10/12/2020  4:25 PM CDT -----  Regarding: FW: Pt just missed a call  Contact: [phone]    ----- Message -----  From: Patsy Brady  Sent: 10/12/2020   4:23 PM CDT  To: [facility] Staff  Subject: Pt just missed a call                            Catie,  Please try again.  Thanks

## 2020-10-22 ENCOUNTER — OFFICE VISIT (OUTPATIENT)
Dept: DERMATOLOGY | Facility: CLINIC | Age: 75
End: 2020-10-22
Payer: MEDICARE

## 2020-10-22 VITALS — BODY MASS INDEX: 42.86 KG/M2 | WEIGHT: 315 LBS

## 2020-10-22 DIAGNOSIS — L73.9 FOLLICULITIS: Primary | ICD-10-CM

## 2020-10-22 DIAGNOSIS — L72.9 BENIGN CYST OF SKIN: ICD-10-CM

## 2020-10-22 PROCEDURE — 1159F MED LIST DOCD IN RCRD: CPT | Mod: HCNC,S$GLB,, | Performed by: DERMATOLOGY

## 2020-10-22 PROCEDURE — 99201 PR OFFICE/OUTPT VISIT,NEW,LEVL I: ICD-10-PCS | Mod: HCNC,S$GLB,, | Performed by: DERMATOLOGY

## 2020-10-22 PROCEDURE — 3008F PR BODY MASS INDEX (BMI) DOCUMENTED: ICD-10-PCS | Mod: HCNC,CPTII,S$GLB, | Performed by: DERMATOLOGY

## 2020-10-22 PROCEDURE — 99999 PR PBB SHADOW E&M-EST. PATIENT-LVL IV: CPT | Mod: PBBFAC,HCNC,, | Performed by: DERMATOLOGY

## 2020-10-22 PROCEDURE — 1101F PT FALLS ASSESS-DOCD LE1/YR: CPT | Mod: HCNC,CPTII,S$GLB, | Performed by: DERMATOLOGY

## 2020-10-22 PROCEDURE — 1126F PR PAIN SEVERITY QUANTIFIED, NO PAIN PRESENT: ICD-10-PCS | Mod: HCNC,S$GLB,, | Performed by: DERMATOLOGY

## 2020-10-22 PROCEDURE — 1101F PR PT FALLS ASSESS DOC 0-1 FALLS W/OUT INJ PAST YR: ICD-10-PCS | Mod: HCNC,CPTII,S$GLB, | Performed by: DERMATOLOGY

## 2020-10-22 PROCEDURE — 3008F BODY MASS INDEX DOCD: CPT | Mod: HCNC,CPTII,S$GLB, | Performed by: DERMATOLOGY

## 2020-10-22 PROCEDURE — 99201 PR OFFICE/OUTPT VISIT,NEW,LEVL I: CPT | Mod: HCNC,S$GLB,, | Performed by: DERMATOLOGY

## 2020-10-22 PROCEDURE — 1159F PR MEDICATION LIST DOCUMENTED IN MEDICAL RECORD: ICD-10-PCS | Mod: HCNC,S$GLB,, | Performed by: DERMATOLOGY

## 2020-10-22 PROCEDURE — 99999 PR PBB SHADOW E&M-EST. PATIENT-LVL IV: ICD-10-PCS | Mod: PBBFAC,HCNC,, | Performed by: DERMATOLOGY

## 2020-10-22 PROCEDURE — 1126F AMNT PAIN NOTED NONE PRSNT: CPT | Mod: HCNC,S$GLB,, | Performed by: DERMATOLOGY

## 2020-10-22 RX ORDER — FLUOCINONIDE TOPICAL SOLUTION USP, 0.05% 0.5 MG/ML
SOLUTION TOPICAL
Qty: 60 ML | Refills: 6 | Status: SHIPPED | OUTPATIENT
Start: 2020-10-22

## 2020-10-22 NOTE — PROGRESS NOTES
Subjective:       Patient ID:  Jake Louie is a 75 y.o. male who presents for   Chief Complaint   Patient presents with    Mole     face, around back of scalp. rasied     Spot on forehead near eyebrow for over a year according to his wife, no changes, not painful.  Also bumps on post scalp no tx.       Review of Systems   Constitutional: Negative for fever, chills, weight loss, weight gain, fatigue, night sweats and malaise.   Skin: Negative for daily sunscreen use, activity-related sunscreen use and wears hat.   Hematologic/Lymphatic: Does not bruise/bleed easily.        Objective:    Physical Exam   Constitutional: He appears well-developed and well-nourished.   Neurological: He is alert and oriented to person, place, and time.   Psychiatric: He has a normal mood and affect.   Skin:   Areas Examined (abnormalities noted in diagram):   Head / Face Inspection Performed              Diagram Legend     Erythematous scaling macule/papule c/w actinic keratosis       Vascular papule c/w angioma      Pigmented verrucoid papule/plaque c/w seborrheic keratosis      Yellow umbilicated papule c/w sebaceous hyperplasia      Irregularly shaped tan macule c/w lentigo     1-2 mm smooth white papules consistent with Milia      Movable subcutaneous cyst with punctum c/w epidermal inclusion cyst      Subcutaneous movable cyst c/w pilar cyst      Firm pink to brown papule c/w dermatofibroma      Pedunculated fleshy papule(s) c/w skin tag(s)      Evenly pigmented macule c/w junctional nevus     Mildly variegated pigmented, slightly irregular-bordered macule c/w mildly atypical nevus      Flesh colored to evenly pigmented papule c/w intradermal nevus       Pink pearly papule/plaque c/w basal cell carcinoma      Erythematous hyperkeratotic cursted plaque c/w SCC      Surgical scar with no sign of skin cancer recurrence      Open and closed comedones      Inflammatory papules and pustules      Verrucoid papule consistent  consistent with wart     Erythematous eczematous patches and plaques     Dystrophic onycholytic nail with subungual debris c/w onychomycosis     Umbilicated papule    Erythematous-base heme-crusted tan verrucoid plaque consistent with inflamed seborrheic keratosis     Erythematous Silvery Scaling Plaque c/w Psoriasis     See annotation      Assessment / Plan:        Folliculitis  -     fluocinonide (LIDEX) 0.05 % external solution; Use hs for scalp  Dispense: 60 mL; Refill: 6  shampoo with zinc    Benign cyst of skin  reassurance  Call if grows, bleeds or turns red             Follow up if symptoms worsen or fail to improve.

## 2020-11-09 ENCOUNTER — PATIENT OUTREACH (OUTPATIENT)
Dept: ADMINISTRATIVE | Facility: OTHER | Age: 75
End: 2020-11-09

## 2020-11-09 NOTE — PROGRESS NOTES
Care Everywhere: updated  Immunization: updated(delay in links)   Health Maintenance: updated  Media Review: review for outside colon cancer and eye exam report   Legacy Review:   Order placed:   Upcoming appts

## 2020-11-10 ENCOUNTER — PATIENT MESSAGE (OUTPATIENT)
Dept: NEUROLOGY | Facility: CLINIC | Age: 75
End: 2020-11-10

## 2020-11-10 DIAGNOSIS — F02.81 ALZHEIMER'S DEMENTIA WITH BEHAVIORAL DISTURBANCE, UNSPECIFIED TIMING OF DEMENTIA ONSET: ICD-10-CM

## 2020-11-10 DIAGNOSIS — G30.9 ALZHEIMER'S DEMENTIA WITHOUT BEHAVIORAL DISTURBANCE, UNSPECIFIED TIMING OF DEMENTIA ONSET: Primary | ICD-10-CM

## 2020-11-10 DIAGNOSIS — F02.80 ALZHEIMER'S DEMENTIA WITHOUT BEHAVIORAL DISTURBANCE, UNSPECIFIED TIMING OF DEMENTIA ONSET: Primary | ICD-10-CM

## 2020-11-10 DIAGNOSIS — G30.9 ALZHEIMER'S DEMENTIA WITH BEHAVIORAL DISTURBANCE, UNSPECIFIED TIMING OF DEMENTIA ONSET: ICD-10-CM

## 2020-11-11 ENCOUNTER — PATIENT OUTREACH (OUTPATIENT)
Dept: ADMINISTRATIVE | Facility: HOSPITAL | Age: 75
End: 2020-11-11

## 2020-11-11 ENCOUNTER — PATIENT MESSAGE (OUTPATIENT)
Dept: NEUROLOGY | Facility: CLINIC | Age: 75
End: 2020-11-11

## 2020-11-12 ENCOUNTER — OUTPATIENT CASE MANAGEMENT (OUTPATIENT)
Dept: NEUROLOGY | Facility: CLINIC | Age: 75
End: 2020-11-12

## 2020-11-13 ENCOUNTER — PATIENT MESSAGE (OUTPATIENT)
Dept: INTERNAL MEDICINE | Facility: CLINIC | Age: 75
End: 2020-11-13

## 2020-11-16 ENCOUNTER — OFFICE VISIT (OUTPATIENT)
Dept: INTERNAL MEDICINE | Facility: CLINIC | Age: 75
End: 2020-11-16
Payer: MEDICARE

## 2020-11-16 VITALS
WEIGHT: 311 LBS | SYSTOLIC BLOOD PRESSURE: 139 MMHG | OXYGEN SATURATION: 98 % | HEART RATE: 80 BPM | HEIGHT: 72 IN | DIASTOLIC BLOOD PRESSURE: 88 MMHG | BODY MASS INDEX: 42.12 KG/M2

## 2020-11-16 DIAGNOSIS — G30.1 LATE ONSET ALZHEIMER'S DISEASE WITHOUT BEHAVIORAL DISTURBANCE: ICD-10-CM

## 2020-11-16 DIAGNOSIS — G47.00 INSOMNIA, UNSPECIFIED TYPE: Primary | ICD-10-CM

## 2020-11-16 DIAGNOSIS — F02.80 LATE ONSET ALZHEIMER'S DISEASE WITHOUT BEHAVIORAL DISTURBANCE: ICD-10-CM

## 2020-11-16 PROCEDURE — 99213 OFFICE O/P EST LOW 20 MIN: CPT | Mod: HCNC,S$GLB,, | Performed by: INTERNAL MEDICINE

## 2020-11-16 PROCEDURE — 3075F SYST BP GE 130 - 139MM HG: CPT | Mod: HCNC,CPTII,S$GLB, | Performed by: INTERNAL MEDICINE

## 2020-11-16 PROCEDURE — 1126F AMNT PAIN NOTED NONE PRSNT: CPT | Mod: HCNC,S$GLB,, | Performed by: INTERNAL MEDICINE

## 2020-11-16 PROCEDURE — 99999 PR PBB SHADOW E&M-EST. PATIENT-LVL III: CPT | Mod: PBBFAC,HCNC,, | Performed by: INTERNAL MEDICINE

## 2020-11-16 PROCEDURE — 3008F BODY MASS INDEX DOCD: CPT | Mod: HCNC,CPTII,S$GLB, | Performed by: INTERNAL MEDICINE

## 2020-11-16 PROCEDURE — 99999 PR PBB SHADOW E&M-EST. PATIENT-LVL III: ICD-10-PCS | Mod: PBBFAC,HCNC,, | Performed by: INTERNAL MEDICINE

## 2020-11-16 PROCEDURE — 99213 PR OFFICE/OUTPT VISIT, EST, LEVL III, 20-29 MIN: ICD-10-PCS | Mod: HCNC,S$GLB,, | Performed by: INTERNAL MEDICINE

## 2020-11-16 PROCEDURE — 3288F FALL RISK ASSESSMENT DOCD: CPT | Mod: HCNC,CPTII,S$GLB, | Performed by: INTERNAL MEDICINE

## 2020-11-16 PROCEDURE — 3079F PR MOST RECENT DIASTOLIC BLOOD PRESSURE 80-89 MM HG: ICD-10-PCS | Mod: HCNC,CPTII,S$GLB, | Performed by: INTERNAL MEDICINE

## 2020-11-16 PROCEDURE — 3288F PR FALLS RISK ASSESSMENT DOCUMENTED: ICD-10-PCS | Mod: HCNC,CPTII,S$GLB, | Performed by: INTERNAL MEDICINE

## 2020-11-16 PROCEDURE — 1126F PR PAIN SEVERITY QUANTIFIED, NO PAIN PRESENT: ICD-10-PCS | Mod: HCNC,S$GLB,, | Performed by: INTERNAL MEDICINE

## 2020-11-16 PROCEDURE — 1159F MED LIST DOCD IN RCRD: CPT | Mod: HCNC,S$GLB,, | Performed by: INTERNAL MEDICINE

## 2020-11-16 PROCEDURE — 3008F PR BODY MASS INDEX (BMI) DOCUMENTED: ICD-10-PCS | Mod: HCNC,CPTII,S$GLB, | Performed by: INTERNAL MEDICINE

## 2020-11-16 PROCEDURE — 3079F DIAST BP 80-89 MM HG: CPT | Mod: HCNC,CPTII,S$GLB, | Performed by: INTERNAL MEDICINE

## 2020-11-16 PROCEDURE — 1101F PR PT FALLS ASSESS DOC 0-1 FALLS W/OUT INJ PAST YR: ICD-10-PCS | Mod: HCNC,CPTII,S$GLB, | Performed by: INTERNAL MEDICINE

## 2020-11-16 PROCEDURE — 1101F PT FALLS ASSESS-DOCD LE1/YR: CPT | Mod: HCNC,CPTII,S$GLB, | Performed by: INTERNAL MEDICINE

## 2020-11-16 PROCEDURE — 1159F PR MEDICATION LIST DOCUMENTED IN MEDICAL RECORD: ICD-10-PCS | Mod: HCNC,S$GLB,, | Performed by: INTERNAL MEDICINE

## 2020-11-16 PROCEDURE — 3075F PR MOST RECENT SYSTOLIC BLOOD PRESS GE 130-139MM HG: ICD-10-PCS | Mod: HCNC,CPTII,S$GLB, | Performed by: INTERNAL MEDICINE

## 2020-11-16 NOTE — PROGRESS NOTES
MEDICAL HISTORY:  Hypertension.  Hyperlipidemia.  Type 2 diabetes.  Complex sleep apnea with the use of CPAP  Dementia  Chronic lower extremity edema with venous insufficiency.  Morbid obesity.  Restless leg syndrome.  Right knee chondroplasty  Adenomatous colon polyp in 2002.  AAA, Endovascular stetnt     SOCIAL HISTORY:  Tobacco use, none.  Alcohol use, none.     MEDICATIONS:  Lisinopril 20 mg  Lasix 20 mg   Metformin 500 mg twice a day.  Pravastatin 20 mg a day.  Vitamin D.  Yobowmp20 mg b.i.d  Lamictal 100 mg b.i.d.  Seroquel 25 mg q.h.s.        75-year-old male, here with his wife    It is being noted that he has sleep disturbance and which whole have no trouble going to sleep, around 10:00 p.m., but with sleep for an hour or an hour and a half and then will stay up for while, go back to sleep and the same thing worker  He may get up couple times a night to urinate  No physical symptom problems in the get up otherwise    He is now on Seroquel 25 mg, usually knob before going to bed.  The wife definitely feels it has helped with his agitation and aggression      she confer with his neurologist in recommend start taking melatonin    Examination  Weight 311 lb  Pulse 80  Blood pressure 138/82    Impression  Insomnia was sleep disturbance  Dementia with agitated behavior  Complex sleep apnea which for the most part he is using his CPAP    Plan  Continue with starting that melatonin but was decided elected to start off taking 2 Seroquel at night and if this not as effective to take 1 at bedtime and then another 1 about an hour to 2 hr later    If this is not effective will recommend he the starting mirtazapine at 15 mg or trazodone at 50 mg which he has used before

## 2020-11-24 ENCOUNTER — RESEARCH ENCOUNTER (OUTPATIENT)
Dept: RESEARCH | Facility: HOSPITAL | Age: 75
End: 2020-11-24

## 2020-11-24 ENCOUNTER — OUTPATIENT CASE MANAGEMENT (OUTPATIENT)
Dept: NEUROLOGY | Facility: CLINIC | Age: 75
End: 2020-11-24

## 2020-11-24 NOTE — PROGRESS NOTES
Objective: Care [facility] enrollment and baseline data collection with Spouse, Sari.     Informed consent was explained and signed. Data was collected using baseline questionnaires that assess demographic information, hospitalization/ED visits, function in activities of daily living, anxiety symptoms on the patient (SANDY-7) and the following on the caregiver: PHQ-9 for depression, Self-Efficacy, Quick Dementia Rating Scale (QDRS), neuropsychiatric inventory (NPIQ-12), and Zarit Modena Interview.     Sari has a moderate understanding of her 's diagnosis, she has adaptive coping skills and behavior management strategies that help her perform as primary caregiver. She expresses important distress levels related to the patient's behavioral symptoms and is interested in receiving information about additional caregiver resources.     The following recommendations were given:  -Behavior management strategies were reinforced and DICE Model handout was introduced.   -Technological aids for communication and finding missing objects were suggested.   -Information and resources on Living Will and Durable Power of  were provided.   -Information on dementia types and symptoms was handed out, along with [place] Someone Who Has Dementia book.   -[place] benefit program brochure was given for information.
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
## 2020-12-09 ENCOUNTER — TELEPHONE (OUTPATIENT)
Dept: NEUROLOGY | Facility: CLINIC | Age: 75
End: 2020-12-09

## 2020-12-09 NOTE — TELEPHONE ENCOUNTER
Wife states patient is doing worse and refusing his medication. Talking to people who is not there and about things that happened in the past. Fell in the back yard 2 weeks ago. Wife would like to know what she can do. Please advise wife.

## 2020-12-09 NOTE — TELEPHONE ENCOUNTER
----- Message from Rose Winkler sent at 12/9/2020  3:11 PM CST -----  Regarding: Behavior  Contact: Mrs. Louie  Pt spouse calling to speak to someone with regard to husbands behavior. Please contact her at [phone]

## 2020-12-10 ENCOUNTER — PATIENT MESSAGE (OUTPATIENT)
Dept: INTERNAL MEDICINE | Facility: CLINIC | Age: 75
End: 2020-12-10

## 2020-12-10 ENCOUNTER — PATIENT MESSAGE (OUTPATIENT)
Dept: NEUROLOGY | Facility: CLINIC | Age: 75
End: 2020-12-10

## 2020-12-10 ENCOUNTER — HOSPITAL ENCOUNTER (EMERGENCY)
Facility: HOSPITAL | Age: 75
Discharge: PSYCHIATRIC OR SUBSTANCE USE FACILITY | End: 2020-12-10
Attending: EMERGENCY MEDICINE
Payer: MEDICARE

## 2020-12-10 VITALS
RESPIRATION RATE: 18 BRPM | OXYGEN SATURATION: 96 % | DIASTOLIC BLOOD PRESSURE: 97 MMHG | SYSTOLIC BLOOD PRESSURE: 135 MMHG | TEMPERATURE: 98 F | HEART RATE: 65 BPM

## 2020-12-10 DIAGNOSIS — F02.81 ALZHEIMER'S DEMENTIA WITH BEHAVIORAL DISTURBANCE, UNSPECIFIED TIMING OF DEMENTIA ONSET: Primary | ICD-10-CM

## 2020-12-10 DIAGNOSIS — G30.9 ALZHEIMER'S DEMENTIA WITH BEHAVIORAL DISTURBANCE, UNSPECIFIED TIMING OF DEMENTIA ONSET: Primary | ICD-10-CM

## 2020-12-10 DIAGNOSIS — R41.82 AMS (ALTERED MENTAL STATUS): ICD-10-CM

## 2020-12-10 DIAGNOSIS — Z00.8 MEDICAL CLEARANCE FOR PSYCHIATRIC ADMISSION: ICD-10-CM

## 2020-12-10 LAB
ALBUMIN SERPL BCP-MCNC: 3.7 G/DL (ref 3.5–5.2)
ALP SERPL-CCNC: 89 U/L (ref 55–135)
ALT SERPL W/O P-5'-P-CCNC: 28 U/L (ref 10–44)
AMPHET+METHAMPHET UR QL: NEGATIVE
ANION GAP SERPL CALC-SCNC: 13 MMOL/L (ref 8–16)
APAP SERPL-MCNC: 4 UG/ML (ref 10–20)
AST SERPL-CCNC: 21 U/L (ref 10–40)
BACTERIA #/AREA URNS AUTO: ABNORMAL /HPF
BARBITURATES UR QL SCN>200 NG/ML: NEGATIVE
BASOPHILS # BLD AUTO: 0.05 K/UL (ref 0–0.2)
BASOPHILS NFR BLD: 0.6 % (ref 0–1.9)
BENZODIAZ UR QL SCN>200 NG/ML: NEGATIVE
BILIRUB SERPL-MCNC: 0.6 MG/DL (ref 0.1–1)
BILIRUB UR QL STRIP: NEGATIVE
BUN SERPL-MCNC: 24 MG/DL (ref 8–23)
BZE UR QL SCN: NEGATIVE
CALCIUM SERPL-MCNC: 9 MG/DL (ref 8.7–10.5)
CANNABINOIDS UR QL SCN: NEGATIVE
CHLORIDE SERPL-SCNC: 105 MMOL/L (ref 95–110)
CLARITY UR REFRACT.AUTO: ABNORMAL
CO2 SERPL-SCNC: 23 MMOL/L (ref 23–29)
COLOR UR AUTO: YELLOW
CREAT SERPL-MCNC: 1.4 MG/DL (ref 0.5–1.4)
CREAT UR-MCNC: 315 MG/DL (ref 23–375)
CTP QC/QA: YES
DIFFERENTIAL METHOD: ABNORMAL
EOSINOPHIL # BLD AUTO: 0.1 K/UL (ref 0–0.5)
EOSINOPHIL NFR BLD: 1 % (ref 0–8)
ERYTHROCYTE [DISTWIDTH] IN BLOOD BY AUTOMATED COUNT: 15.4 % (ref 11.5–14.5)
EST. GFR  (AFRICAN AMERICAN): 56.8 ML/MIN/1.73 M^2
EST. GFR  (NON AFRICAN AMERICAN): 49.1 ML/MIN/1.73 M^2
ETHANOL SERPL-MCNC: <10 MG/DL
GLUCOSE SERPL-MCNC: 121 MG/DL (ref 70–110)
GLUCOSE UR QL STRIP: NEGATIVE
HCT VFR BLD AUTO: 45.2 % (ref 40–54)
HGB BLD-MCNC: 14.5 G/DL (ref 14–18)
HGB UR QL STRIP: NEGATIVE
HYALINE CASTS UR QL AUTO: 44 /LPF
IMM GRANULOCYTES # BLD AUTO: 0.02 K/UL (ref 0–0.04)
IMM GRANULOCYTES NFR BLD AUTO: 0.2 % (ref 0–0.5)
KETONES UR QL STRIP: NEGATIVE
LEUKOCYTE ESTERASE UR QL STRIP: NEGATIVE
LYMPHOCYTES # BLD AUTO: 1.6 K/UL (ref 1–4.8)
LYMPHOCYTES NFR BLD: 18.9 % (ref 18–48)
MCH RBC QN AUTO: 28.5 PG (ref 27–31)
MCHC RBC AUTO-ENTMCNC: 32.1 G/DL (ref 32–36)
MCV RBC AUTO: 89 FL (ref 82–98)
METHADONE UR QL SCN>300 NG/ML: NEGATIVE
MICROSCOPIC COMMENT: ABNORMAL
MONOCYTES # BLD AUTO: 0.8 K/UL (ref 0.3–1)
MONOCYTES NFR BLD: 9.7 % (ref 4–15)
NEUTROPHILS # BLD AUTO: 5.8 K/UL (ref 1.8–7.7)
NEUTROPHILS NFR BLD: 69.6 % (ref 38–73)
NITRITE UR QL STRIP: NEGATIVE
NRBC BLD-RTO: 0 /100 WBC
OPIATES UR QL SCN: NEGATIVE
PCP UR QL SCN>25 NG/ML: NEGATIVE
PH UR STRIP: 5 [PH] (ref 5–8)
PLATELET # BLD AUTO: 243 K/UL (ref 150–350)
PMV BLD AUTO: 9.4 FL (ref 9.2–12.9)
POTASSIUM SERPL-SCNC: 4 MMOL/L (ref 3.5–5.1)
PROT SERPL-MCNC: 6.9 G/DL (ref 6–8.4)
PROT UR QL STRIP: ABNORMAL
RBC # BLD AUTO: 5.09 M/UL (ref 4.6–6.2)
RBC #/AREA URNS AUTO: 1 /HPF (ref 0–4)
SARS-COV-2 RDRP RESP QL NAA+PROBE: NEGATIVE
SODIUM SERPL-SCNC: 141 MMOL/L (ref 136–145)
SP GR UR STRIP: 1.03 (ref 1–1.03)
SQUAMOUS #/AREA URNS AUTO: 0 /HPF
TOXICOLOGY INFORMATION: NORMAL
TSH SERPL DL<=0.005 MIU/L-ACNC: 2.31 UIU/ML (ref 0.4–4)
URN SPEC COLLECT METH UR: ABNORMAL
WBC # BLD AUTO: 8.26 K/UL (ref 3.9–12.7)
WBC #/AREA URNS AUTO: 2 /HPF (ref 0–5)

## 2020-12-10 PROCEDURE — 93005 ELECTROCARDIOGRAM TRACING: CPT | Mod: HCNC | Performed by: INTERNAL MEDICINE

## 2020-12-10 PROCEDURE — 80053 COMPREHEN METABOLIC PANEL: CPT | Mod: HCNC

## 2020-12-10 PROCEDURE — 99285 EMERGENCY DEPT VISIT HI MDM: CPT | Mod: 25,HCNC

## 2020-12-10 PROCEDURE — 85025 COMPLETE CBC W/AUTO DIFF WBC: CPT | Mod: HCNC

## 2020-12-10 PROCEDURE — 80329 ANALGESICS NON-OPIOID 1 OR 2: CPT | Mod: HCNC

## 2020-12-10 PROCEDURE — 81001 URINALYSIS AUTO W/SCOPE: CPT | Mod: HCNC,59

## 2020-12-10 PROCEDURE — 80307 DRUG TEST PRSMV CHEM ANLYZR: CPT | Mod: HCNC

## 2020-12-10 PROCEDURE — 80320 DRUG SCREEN QUANTALCOHOLS: CPT | Mod: HCNC

## 2020-12-10 PROCEDURE — 99284 EMERGENCY DEPT VISIT MOD MDM: CPT | Mod: HCNC,GC,CS, | Performed by: EMERGENCY MEDICINE

## 2020-12-10 PROCEDURE — 93010 ELECTROCARDIOGRAM REPORT: CPT | Mod: HCNC,,, | Performed by: INTERNAL MEDICINE

## 2020-12-10 PROCEDURE — U0002 COVID-19 LAB TEST NON-CDC: HCPCS | Mod: HCNC | Performed by: EMERGENCY MEDICINE

## 2020-12-10 PROCEDURE — 63600175 PHARM REV CODE 636 W HCPCS: Mod: HCNC | Performed by: EMERGENCY MEDICINE

## 2020-12-10 PROCEDURE — 84443 ASSAY THYROID STIM HORMONE: CPT | Mod: HCNC

## 2020-12-10 PROCEDURE — 96372 THER/PROPH/DIAG INJ SC/IM: CPT | Mod: HCNC

## 2020-12-10 PROCEDURE — 99284 PR EMERGENCY DEPT VISIT,LEVEL IV: ICD-10-PCS | Mod: HCNC,GC,CS, | Performed by: EMERGENCY MEDICINE

## 2020-12-10 PROCEDURE — 93010 EKG 12-LEAD: ICD-10-PCS | Mod: HCNC,,, | Performed by: INTERNAL MEDICINE

## 2020-12-10 RX ORDER — HALOPERIDOL 5 MG/ML
5 INJECTION INTRAMUSCULAR
Status: COMPLETED | OUTPATIENT
Start: 2020-12-10 | End: 2020-12-10

## 2020-12-10 RX ADMIN — HALOPERIDOL LACTATE 5 MG: 5 INJECTION, SOLUTION INTRAMUSCULAR at 06:12
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
## 2020-12-10 NOTE — ED NOTES
Called [facility] at [facility] and advised that pt would not go with [facility]. Roselyn states she will set up transport with [facility].

## 2020-12-10 NOTE — ED NOTES
Attempted to reach pt wife. Pt wife did not answer, but message was left advising wife of pt transfer.

## 2020-12-10 NOTE — ED NOTES
Called [facility] and spoke with RN and updated on pt status. Advised her that [facility] was being dispatched to transport pt.

## 2020-12-10 NOTE — ED NOTES
Assumed pt care, he is lying on the stretcher resting NAD noted. Pt's spouse notified of his pending transfer to [facility] in [place].
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
## 2020-12-10 NOTE — ED TRIAGE NOTES
Pt brought to ED from home via  EMS. Pt wife called EMS and states pt with AMS for the past two days. Pt is hallucinating and talking to people who are not present. Pt with HX of dementia. Pt AOX1 to self. Pt denies c/o but is poor historian. Pt with swelling to lower ext and redness to bilat lower ext.
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
## 2020-12-23 ENCOUNTER — TELEPHONE (OUTPATIENT)
Dept: NEUROLOGY | Facility: CLINIC | Age: 75
End: 2020-12-23

## 2020-12-23 ENCOUNTER — OUTPATIENT CASE MANAGEMENT (OUTPATIENT)
Dept: NEUROLOGY | Facility: CLINIC | Age: 75
End: 2020-12-23

## 2020-12-23 ENCOUNTER — PATIENT OUTREACH (OUTPATIENT)
Dept: ADMINISTRATIVE | Facility: HOSPITAL | Age: 75
End: 2020-12-23

## 2020-12-23 NOTE — TELEPHONE ENCOUNTER
Patient wife states she is concern about how much medication the patient is taking. He was sent home on remeron 30 mg, prozac bid, and se

## 2020-12-23 NOTE — TELEPHONE ENCOUNTER
----- Message from Ab Edwards sent at 12/23/2020  2:47 PM CST -----  Contact: Sari ( spouse ) @[phone]  Caller requesting a return phone call regarding medication at the [facility].

## 2020-12-23 NOTE — TELEPHONE ENCOUNTER
Patient wife states she is concerned about how much medication the patient was sent home to take. Remeron 30 mg, Prozac bid, seroquel 400 mg qd. Please advise.

## 2020-12-23 NOTE — PROGRESS NOTES
[email or web address]  Still at [facility], extended by 2 days.  Wasn't able to go to place [facility]. Coming home today. No outbursts at facility and no signs of aggression. Cg has been reading up and knows to call 911 if he becomes aggressive. I advised to have an exit strategy, always carry her cell phone. try to be close to door to leave or get behind locked door. Urged to leave vs stay if he becomes aggressive, items in the apt can be replaced, her safety is more important.   Urged to look into memory facilities in the area, she has a few in [place] to look at.   CG requested call next week for us to review mood/behavior after a few days home.

## 2020-12-23 NOTE — TELEPHONE ENCOUNTER
----- Message from Ab Edwards sent at 12/23/2020  2:47 PM CST -----  Contact: Sari ( spouse ) @[phone]  Caller requesting a return phone call regarding medication at the [facility].

## 2020-12-24 ENCOUNTER — PATIENT OUTREACH (OUTPATIENT)
Dept: ADMINISTRATIVE | Facility: OTHER | Age: 75
End: 2020-12-24

## 2020-12-24 NOTE — PROGRESS NOTES
Requested updates within Care Everywhere.  Patient's chart was reviewed for overdue HUMBERTO topics.  Media reviewed for outside eye exam and colonoscopy.  Immunizations reconciled.

## 2020-12-29 ENCOUNTER — OUTPATIENT CASE MANAGEMENT (OUTPATIENT)
Dept: NEUROLOGY | Facility: CLINIC | Age: 75
End: 2020-12-29

## 2021-01-04 ENCOUNTER — PATIENT MESSAGE (OUTPATIENT)
Dept: ADMINISTRATIVE | Facility: HOSPITAL | Age: 76
End: 2021-01-04

## 2021-01-07 ENCOUNTER — PATIENT MESSAGE (OUTPATIENT)
Dept: INTERNAL MEDICINE | Facility: CLINIC | Age: 76
End: 2021-01-07

## 2021-01-07 ENCOUNTER — OFFICE VISIT (OUTPATIENT)
Dept: INTERNAL MEDICINE | Facility: CLINIC | Age: 76
End: 2021-01-07
Payer: MEDICARE

## 2021-01-07 ENCOUNTER — LAB VISIT (OUTPATIENT)
Dept: LAB | Facility: HOSPITAL | Age: 76
End: 2021-01-07
Attending: INTERNAL MEDICINE
Payer: MEDICARE

## 2021-01-07 ENCOUNTER — PATIENT OUTREACH (OUTPATIENT)
Dept: ADMINISTRATIVE | Facility: HOSPITAL | Age: 76
End: 2021-01-07

## 2021-01-07 VITALS
BODY MASS INDEX: 42.12 KG/M2 | SYSTOLIC BLOOD PRESSURE: 130 MMHG | WEIGHT: 311 LBS | OXYGEN SATURATION: 96 % | HEART RATE: 90 BPM | HEIGHT: 72 IN | DIASTOLIC BLOOD PRESSURE: 82 MMHG

## 2021-01-07 DIAGNOSIS — R01.1 CARDIAC MURMUR: ICD-10-CM

## 2021-01-07 DIAGNOSIS — R10.9 ABDOMINAL WALL PAIN: ICD-10-CM

## 2021-01-07 DIAGNOSIS — E11.9 TYPE 2 DIABETES MELLITUS WITHOUT COMPLICATION, WITHOUT LONG-TERM CURRENT USE OF INSULIN: ICD-10-CM

## 2021-01-07 DIAGNOSIS — I10 ESSENTIAL HYPERTENSION: ICD-10-CM

## 2021-01-07 DIAGNOSIS — F03.91 DEMENTIA WITH BEHAVIORAL DISTURBANCE, UNSPECIFIED DEMENTIA TYPE: ICD-10-CM

## 2021-01-07 DIAGNOSIS — R10.9 ABDOMINAL WALL PAIN: Primary | ICD-10-CM

## 2021-01-07 LAB
ANION GAP SERPL CALC-SCNC: 7 MMOL/L (ref 8–16)
BUN SERPL-MCNC: 19 MG/DL (ref 8–23)
CALCIUM SERPL-MCNC: 8.6 MG/DL (ref 8.7–10.5)
CHLORIDE SERPL-SCNC: 107 MMOL/L (ref 95–110)
CO2 SERPL-SCNC: 28 MMOL/L (ref 23–29)
CREAT SERPL-MCNC: 1 MG/DL (ref 0.5–1.4)
EST. GFR  (AFRICAN AMERICAN): >60 ML/MIN/1.73 M^2
EST. GFR  (NON AFRICAN AMERICAN): >60 ML/MIN/1.73 M^2
ESTIMATED AVG GLUCOSE: 108 MG/DL (ref 68–131)
GLUCOSE SERPL-MCNC: 91 MG/DL (ref 70–110)
HBA1C MFR BLD HPLC: 5.4 % (ref 4–5.6)
POTASSIUM SERPL-SCNC: 4.2 MMOL/L (ref 3.5–5.1)
SODIUM SERPL-SCNC: 142 MMOL/L (ref 136–145)

## 2021-01-07 PROCEDURE — 36415 COLL VENOUS BLD VENIPUNCTURE: CPT | Mod: HCNC

## 2021-01-07 PROCEDURE — 99499 UNLISTED E&M SERVICE: CPT | Mod: HCNC,S$GLB,, | Performed by: INTERNAL MEDICINE

## 2021-01-07 PROCEDURE — 99999 PR PBB SHADOW E&M-EST. PATIENT-LVL IV: CPT | Mod: PBBFAC,HCNC,, | Performed by: INTERNAL MEDICINE

## 2021-01-07 PROCEDURE — 80048 BASIC METABOLIC PNL TOTAL CA: CPT | Mod: HCNC

## 2021-01-07 PROCEDURE — 99999 PR PBB SHADOW E&M-EST. PATIENT-LVL IV: ICD-10-PCS | Mod: PBBFAC,HCNC,, | Performed by: INTERNAL MEDICINE

## 2021-01-07 PROCEDURE — 3079F DIAST BP 80-89 MM HG: CPT | Mod: HCNC,CPTII,S$GLB, | Performed by: INTERNAL MEDICINE

## 2021-01-07 PROCEDURE — 99214 PR OFFICE/OUTPT VISIT, EST, LEVL IV, 30-39 MIN: ICD-10-PCS | Mod: HCNC,S$GLB,, | Performed by: INTERNAL MEDICINE

## 2021-01-07 PROCEDURE — 3079F PR MOST RECENT DIASTOLIC BLOOD PRESSURE 80-89 MM HG: ICD-10-PCS | Mod: HCNC,CPTII,S$GLB, | Performed by: INTERNAL MEDICINE

## 2021-01-07 PROCEDURE — 99499 UNLISTED E&M SERVICE: CPT | Mod: HCNC,,, | Performed by: INTERNAL MEDICINE

## 2021-01-07 PROCEDURE — 3075F PR MOST RECENT SYSTOLIC BLOOD PRESS GE 130-139MM HG: ICD-10-PCS | Mod: HCNC,CPTII,S$GLB, | Performed by: INTERNAL MEDICINE

## 2021-01-07 PROCEDURE — 1125F PR PAIN SEVERITY QUANTIFIED, PAIN PRESENT: ICD-10-PCS | Mod: HCNC,S$GLB,, | Performed by: INTERNAL MEDICINE

## 2021-01-07 PROCEDURE — 99214 OFFICE O/P EST MOD 30 MIN: CPT | Mod: HCNC,S$GLB,, | Performed by: INTERNAL MEDICINE

## 2021-01-07 PROCEDURE — 3288F PR FALLS RISK ASSESSMENT DOCUMENTED: ICD-10-PCS | Mod: HCNC,CPTII,S$GLB, | Performed by: INTERNAL MEDICINE

## 2021-01-07 PROCEDURE — 1159F MED LIST DOCD IN RCRD: CPT | Mod: HCNC,S$GLB,, | Performed by: INTERNAL MEDICINE

## 2021-01-07 PROCEDURE — 1101F PR PT FALLS ASSESS DOC 0-1 FALLS W/OUT INJ PAST YR: ICD-10-PCS | Mod: HCNC,CPTII,S$GLB, | Performed by: INTERNAL MEDICINE

## 2021-01-07 PROCEDURE — 3008F PR BODY MASS INDEX (BMI) DOCUMENTED: ICD-10-PCS | Mod: HCNC,CPTII,S$GLB, | Performed by: INTERNAL MEDICINE

## 2021-01-07 PROCEDURE — 1101F PT FALLS ASSESS-DOCD LE1/YR: CPT | Mod: HCNC,CPTII,S$GLB, | Performed by: INTERNAL MEDICINE

## 2021-01-07 PROCEDURE — 83036 HEMOGLOBIN GLYCOSYLATED A1C: CPT | Mod: HCNC

## 2021-01-07 PROCEDURE — 1159F PR MEDICATION LIST DOCUMENTED IN MEDICAL RECORD: ICD-10-PCS | Mod: HCNC,S$GLB,, | Performed by: INTERNAL MEDICINE

## 2021-01-07 PROCEDURE — 99499 RISK ADDL DX/OHS AUDIT: ICD-10-PCS | Mod: HCNC,S$GLB,, | Performed by: INTERNAL MEDICINE

## 2021-01-07 PROCEDURE — 1125F AMNT PAIN NOTED PAIN PRSNT: CPT | Mod: HCNC,S$GLB,, | Performed by: INTERNAL MEDICINE

## 2021-01-07 PROCEDURE — 3044F PR MOST RECENT HEMOGLOBIN A1C LEVEL <7.0%: ICD-10-PCS | Mod: HCNC,CPTII,S$GLB, | Performed by: INTERNAL MEDICINE

## 2021-01-07 PROCEDURE — 99499 RISK ADDL DX/OHS AUDIT: ICD-10-PCS | Mod: HCNC,,, | Performed by: INTERNAL MEDICINE

## 2021-01-07 PROCEDURE — 3044F HG A1C LEVEL LT 7.0%: CPT | Mod: HCNC,CPTII,S$GLB, | Performed by: INTERNAL MEDICINE

## 2021-01-07 PROCEDURE — 3008F BODY MASS INDEX DOCD: CPT | Mod: HCNC,CPTII,S$GLB, | Performed by: INTERNAL MEDICINE

## 2021-01-07 PROCEDURE — 3288F FALL RISK ASSESSMENT DOCD: CPT | Mod: HCNC,CPTII,S$GLB, | Performed by: INTERNAL MEDICINE

## 2021-01-07 PROCEDURE — 3075F SYST BP GE 130 - 139MM HG: CPT | Mod: HCNC,CPTII,S$GLB, | Performed by: INTERNAL MEDICINE

## 2021-01-07 RX ORDER — DONEPEZIL HYDROCHLORIDE 10 MG/1
10 TABLET, FILM COATED ORAL NIGHTLY
COMMUNITY
Start: 2020-12-23 | End: 2021-01-20 | Stop reason: SDUPTHER

## 2021-01-07 RX ORDER — FLUOXETINE HYDROCHLORIDE 20 MG/1
20 CAPSULE ORAL 2 TIMES DAILY
COMMUNITY
Start: 2020-12-23 | End: 2021-01-20 | Stop reason: SDUPTHER

## 2021-01-07 RX ORDER — MIRTAZAPINE 30 MG/1
30 TABLET, FILM COATED ORAL NIGHTLY
COMMUNITY
Start: 2020-12-23 | End: 2021-01-20 | Stop reason: SDUPTHER

## 2021-01-07 RX ORDER — AMLODIPINE BESYLATE 5 MG/1
5 TABLET ORAL DAILY
COMMUNITY
Start: 2020-12-23 | End: 2021-01-20 | Stop reason: SDUPTHER

## 2021-01-07 RX ORDER — ATORVASTATIN CALCIUM 10 MG/1
5 TABLET, FILM COATED ORAL 2 TIMES DAILY
COMMUNITY
Start: 2020-12-23 | End: 2021-01-20 | Stop reason: SDUPTHER

## 2021-01-07 RX ORDER — QUETIAPINE FUMARATE 100 MG/1
TABLET, FILM COATED ORAL
COMMUNITY
Start: 2020-12-29 | End: 2021-01-20 | Stop reason: SDUPTHER

## 2021-01-08 ENCOUNTER — PATIENT MESSAGE (OUTPATIENT)
Dept: INTERNAL MEDICINE | Facility: CLINIC | Age: 76
End: 2021-01-08

## 2021-01-08 RX ORDER — MELOXICAM 15 MG/1
15 TABLET ORAL DAILY
Qty: 10 TABLET | Refills: 0 | Status: SHIPPED | OUTPATIENT
Start: 2021-01-08 | End: 2021-01-18

## 2021-01-12 ENCOUNTER — TELEPHONE (OUTPATIENT)
Dept: NEUROLOGY | Facility: CLINIC | Age: 76
End: 2021-01-12

## 2021-01-14 ENCOUNTER — HOSPITAL ENCOUNTER (OUTPATIENT)
Dept: CARDIOLOGY | Facility: HOSPITAL | Age: 76
Discharge: HOME | End: 2021-01-14
Attending: INTERNAL MEDICINE
Payer: MEDICARE

## 2021-01-14 VITALS
HEART RATE: 78 BPM | BODY MASS INDEX: 42.12 KG/M2 | WEIGHT: 311 LBS | DIASTOLIC BLOOD PRESSURE: 74 MMHG | HEIGHT: 72 IN | SYSTOLIC BLOOD PRESSURE: 122 MMHG

## 2021-01-14 DIAGNOSIS — R60.0 LOWER EXTREMITY EDEMA: ICD-10-CM

## 2021-01-14 DIAGNOSIS — R01.1 CARDIAC MURMUR: ICD-10-CM

## 2021-01-14 LAB
ASCENDING AORTA: 4 CM
AV INDEX (PROSTH): 0.46
AV MEAN GRADIENT: 14 MMHG
AV PEAK GRADIENT: 20 MMHG
AV VALVE AREA: 1.83 CM2
AV VELOCITY RATIO: 0.43
BSA FOR ECHO PROCEDURE: 2.68 M2
CV ECHO LV RWT: 0.36 CM
DOP CALC AO PEAK VEL: 2.23 M/S
DOP CALC AO VTI: 40.46 CM
DOP CALC LVOT AREA: 4 CM2
DOP CALC LVOT DIAMETER: 2.25 CM
DOP CALC LVOT PEAK VEL: 0.96 M/S
DOP CALC LVOT STROKE VOLUME: 74 CM3
DOP CALCLVOT PEAK VEL VTI: 18.62 CM
E WAVE DECELERATION TIME: 218.76 MSEC
E/A RATIO: 0.7
E/E' RATIO: 9.64 M/S
ECHO LV POSTERIOR WALL: 0.95 CM (ref 0.6–1.1)
FRACTIONAL SHORTENING: 28 % (ref 28–44)
INTERVENTRICULAR SEPTUM: 0.99 CM (ref 0.6–1.1)
IVRT: 97.05 MSEC
LA MAJOR: 5.59 CM
LA MINOR: 5.56 CM
LA WIDTH: 3.78 CM
LEFT ATRIUM SIZE: 3.79 CM
LEFT ATRIUM VOLUME INDEX MOD: 16.9 ML/M2
LEFT ATRIUM VOLUME INDEX: 26.4 ML/M2
LEFT ATRIUM VOLUME MOD: 43.52 CM3
LEFT ATRIUM VOLUME: 67.89 CM3
LEFT INTERNAL DIMENSION IN SYSTOLE: 3.8 CM (ref 2.1–4)
LEFT VENTRICLE DIASTOLIC VOLUME INDEX: 51.59 ML/M2
LEFT VENTRICLE DIASTOLIC VOLUME: 132.58 ML
LEFT VENTRICLE MASS INDEX: 74 G/M2
LEFT VENTRICLE SYSTOLIC VOLUME INDEX: 24.1 ML/M2
LEFT VENTRICLE SYSTOLIC VOLUME: 61.96 ML
LEFT VENTRICULAR INTERNAL DIMENSION IN DIASTOLE: 5.25 CM (ref 3.5–6)
LEFT VENTRICULAR MASS: 189.46 G
LV LATERAL E/E' RATIO: 10.6 M/S
LV SEPTAL E/E' RATIO: 8.83 M/S
MV A" WAVE DURATION": 11.7 MSEC
MV PEAK A VEL: 0.76 M/S
MV PEAK E VEL: 0.53 M/S
PISA TR MAX VEL: 1.68 M/S
PULM VEIN S/D RATIO: 1.85
PV PEAK D VEL: 0.33 M/S
PV PEAK S VEL: 0.61 M/S
RA MAJOR: 5 CM
RA PRESSURE: 3 MMHG
RA WIDTH: 3.59 CM
RIGHT VENTRICULAR END-DIASTOLIC DIMENSION: 3.33 CM
RV TISSUE DOPPLER FREE WALL SYSTOLIC VELOCITY 1 (APICAL 4 CHAMBER VIEW): 14.8 CM/S
SINUS: 3.29 CM
STJ: 3.04 CM
TDI LATERAL: 0.05 M/S
TDI SEPTAL: 0.06 M/S
TDI: 0.06 M/S
TR MAX PG: 11 MMHG
TRICUSPID ANNULAR PLANE SYSTOLIC EXCURSION: 1.88 CM
TV REST PULMONARY ARTERY PRESSURE: 14 MMHG

## 2021-01-14 PROCEDURE — 93306 ECHO (CUPID ONLY): ICD-10-PCS | Mod: 26,HCNC,, | Performed by: INTERNAL MEDICINE

## 2021-01-14 PROCEDURE — 93306 TTE W/DOPPLER COMPLETE: CPT | Mod: HCNC

## 2021-01-14 PROCEDURE — 93306 TTE W/DOPPLER COMPLETE: CPT | Mod: 26,HCNC,, | Performed by: INTERNAL MEDICINE

## 2021-01-16 ENCOUNTER — HOSPITAL ENCOUNTER (EMERGENCY)
Facility: HOSPITAL | Age: 76
Discharge: HOME | End: 2021-01-16
Attending: EMERGENCY MEDICINE
Payer: MEDICARE

## 2021-01-16 VITALS
BODY MASS INDEX: 41.99 KG/M2 | OXYGEN SATURATION: 98 % | RESPIRATION RATE: 18 BRPM | HEART RATE: 70 BPM | SYSTOLIC BLOOD PRESSURE: 145 MMHG | TEMPERATURE: 99 F | DIASTOLIC BLOOD PRESSURE: 89 MMHG | HEIGHT: 72 IN | WEIGHT: 310 LBS

## 2021-01-16 DIAGNOSIS — R42 DIZZINESS: ICD-10-CM

## 2021-01-16 LAB
ALBUMIN SERPL BCP-MCNC: 3.5 G/DL (ref 3.5–5.2)
ALP SERPL-CCNC: 102 U/L (ref 55–135)
ALT SERPL W/O P-5'-P-CCNC: 23 U/L (ref 10–44)
ANION GAP SERPL CALC-SCNC: 10 MMOL/L (ref 8–16)
AST SERPL-CCNC: 13 U/L (ref 10–40)
BASOPHILS # BLD AUTO: 0.05 K/UL (ref 0–0.2)
BASOPHILS NFR BLD: 0.8 % (ref 0–1.9)
BILIRUB SERPL-MCNC: 0.4 MG/DL (ref 0.1–1)
BILIRUB UR QL STRIP: NEGATIVE
BUN SERPL-MCNC: 20 MG/DL (ref 8–23)
CALCIUM SERPL-MCNC: 8.9 MG/DL (ref 8.7–10.5)
CHLORIDE SERPL-SCNC: 105 MMOL/L (ref 95–110)
CLARITY UR REFRACT.AUTO: CLEAR
CO2 SERPL-SCNC: 29 MMOL/L (ref 23–29)
COLOR UR AUTO: YELLOW
CREAT SERPL-MCNC: 1.1 MG/DL (ref 0.5–1.4)
DIFFERENTIAL METHOD: ABNORMAL
EOSINOPHIL # BLD AUTO: 0.2 K/UL (ref 0–0.5)
EOSINOPHIL NFR BLD: 2.9 % (ref 0–8)
ERYTHROCYTE [DISTWIDTH] IN BLOOD BY AUTOMATED COUNT: 15.6 % (ref 11.5–14.5)
EST. GFR  (AFRICAN AMERICAN): >60 ML/MIN/1.73 M^2
EST. GFR  (NON AFRICAN AMERICAN): >60 ML/MIN/1.73 M^2
GLUCOSE SERPL-MCNC: 115 MG/DL (ref 70–110)
GLUCOSE UR QL STRIP: NEGATIVE
HCT VFR BLD AUTO: 44.9 % (ref 40–54)
HGB BLD-MCNC: 13.9 G/DL (ref 14–18)
HGB UR QL STRIP: NEGATIVE
IMM GRANULOCYTES # BLD AUTO: 0.03 K/UL (ref 0–0.04)
IMM GRANULOCYTES NFR BLD AUTO: 0.5 % (ref 0–0.5)
KETONES UR QL STRIP: NEGATIVE
LEUKOCYTE ESTERASE UR QL STRIP: NEGATIVE
LYMPHOCYTES # BLD AUTO: 1.8 K/UL (ref 1–4.8)
LYMPHOCYTES NFR BLD: 28.4 % (ref 18–48)
MCH RBC QN AUTO: 28.8 PG (ref 27–31)
MCHC RBC AUTO-ENTMCNC: 31 G/DL (ref 32–36)
MCV RBC AUTO: 93 FL (ref 82–98)
MONOCYTES # BLD AUTO: 0.6 K/UL (ref 0.3–1)
MONOCYTES NFR BLD: 9.7 % (ref 4–15)
NEUTROPHILS # BLD AUTO: 3.7 K/UL (ref 1.8–7.7)
NEUTROPHILS NFR BLD: 57.7 % (ref 38–73)
NITRITE UR QL STRIP: NEGATIVE
NRBC BLD-RTO: 0 /100 WBC
PH UR STRIP: 5 [PH] (ref 5–8)
PLATELET # BLD AUTO: 229 K/UL (ref 150–350)
PMV BLD AUTO: 9.6 FL (ref 9.2–12.9)
POTASSIUM SERPL-SCNC: 4.2 MMOL/L (ref 3.5–5.1)
PROT SERPL-MCNC: 6.5 G/DL (ref 6–8.4)
PROT UR QL STRIP: NEGATIVE
RBC # BLD AUTO: 4.83 M/UL (ref 4.6–6.2)
SODIUM SERPL-SCNC: 144 MMOL/L (ref 136–145)
SP GR UR STRIP: 1.02 (ref 1–1.03)
TROPONIN I SERPL DL<=0.01 NG/ML-MCNC: <0.006 NG/ML (ref 0–0.03)
URN SPEC COLLECT METH UR: NORMAL
WBC # BLD AUTO: 6.47 K/UL (ref 3.9–12.7)

## 2021-01-16 PROCEDURE — 81003 URINALYSIS AUTO W/O SCOPE: CPT | Mod: HCNC

## 2021-01-16 PROCEDURE — 80053 COMPREHEN METABOLIC PANEL: CPT | Mod: HCNC

## 2021-01-16 PROCEDURE — 99285 EMERGENCY DEPT VISIT HI MDM: CPT | Mod: 25,HCNC

## 2021-01-16 PROCEDURE — 99285 EMERGENCY DEPT VISIT HI MDM: CPT | Mod: HCNC,,, | Performed by: EMERGENCY MEDICINE

## 2021-01-16 PROCEDURE — 99285 PR EMERGENCY DEPT VISIT,LEVEL V: ICD-10-PCS | Mod: HCNC,,, | Performed by: EMERGENCY MEDICINE

## 2021-01-16 PROCEDURE — 85025 COMPLETE CBC W/AUTO DIFF WBC: CPT | Mod: HCNC

## 2021-01-16 PROCEDURE — 93010 EKG 12-LEAD: ICD-10-PCS | Mod: HCNC,,, | Performed by: INTERNAL MEDICINE

## 2021-01-16 PROCEDURE — 84484 ASSAY OF TROPONIN QUANT: CPT | Mod: HCNC

## 2021-01-16 PROCEDURE — 93005 ELECTROCARDIOGRAM TRACING: CPT | Mod: HCNC

## 2021-01-16 PROCEDURE — 93010 ELECTROCARDIOGRAM REPORT: CPT | Mod: HCNC,,, | Performed by: INTERNAL MEDICINE

## 2021-01-16 RX ORDER — QUETIAPINE FUMARATE 200 MG/1
200 TABLET, FILM COATED ORAL NIGHTLY
COMMUNITY
End: 2021-01-20 | Stop reason: SDUPTHER

## 2021-01-20 ENCOUNTER — PATIENT MESSAGE (OUTPATIENT)
Dept: INTERNAL MEDICINE | Facility: CLINIC | Age: 76
End: 2021-01-20

## 2021-01-21 ENCOUNTER — INITIAL CONSULT (OUTPATIENT)
Dept: NEUROLOGY | Facility: CLINIC | Age: 76
End: 2021-01-21
Payer: MEDICARE

## 2021-01-21 DIAGNOSIS — G30.9 ALZHEIMER'S DEMENTIA WITH BEHAVIORAL DISTURBANCE, UNSPECIFIED TIMING OF DEMENTIA ONSET: ICD-10-CM

## 2021-01-21 DIAGNOSIS — F02.81 ALZHEIMER'S DEMENTIA WITH BEHAVIORAL DISTURBANCE, UNSPECIFIED TIMING OF DEMENTIA ONSET: ICD-10-CM

## 2021-01-21 DIAGNOSIS — F02.818 LATE ONSET ALZHEIMER'S DISEASE WITH BEHAVIORAL DISTURBANCE: ICD-10-CM

## 2021-01-21 DIAGNOSIS — G30.1 LATE ONSET ALZHEIMER'S DISEASE WITH BEHAVIORAL DISTURBANCE: ICD-10-CM

## 2021-01-21 PROCEDURE — 96116 PR NEUROBEHAVIORAL STATUS EXAM BY PSYCH/PHYS: ICD-10-PCS | Mod: HCNC,S$GLB,, | Performed by: PSYCHIATRY & NEUROLOGY

## 2021-01-21 PROCEDURE — 99214 PR OFFICE/OUTPT VISIT, EST, LEVL IV, 30-39 MIN: ICD-10-PCS | Mod: HCNC,S$GLB,, | Performed by: PSYCHIATRY & NEUROLOGY

## 2021-01-21 PROCEDURE — 1159F PR MEDICATION LIST DOCUMENTED IN MEDICAL RECORD: ICD-10-PCS | Mod: HCNC,S$GLB,, | Performed by: PSYCHIATRY & NEUROLOGY

## 2021-01-21 PROCEDURE — 1159F MED LIST DOCD IN RCRD: CPT | Mod: HCNC,S$GLB,, | Performed by: PSYCHIATRY & NEUROLOGY

## 2021-01-21 PROCEDURE — 99999 PR PBB SHADOW E&M-EST. PATIENT-LVL I: CPT | Mod: PBBFAC,HCNC,,

## 2021-01-21 PROCEDURE — 99999 PR PBB SHADOW E&M-EST. PATIENT-LVL I: ICD-10-PCS | Mod: PBBFAC,HCNC,,

## 2021-01-21 PROCEDURE — 99214 OFFICE O/P EST MOD 30 MIN: CPT | Mod: HCNC,S$GLB,, | Performed by: PSYCHIATRY & NEUROLOGY

## 2021-01-21 PROCEDURE — 96116 NUBHVL XM PHYS/QHP 1ST HR: CPT | Mod: HCNC,S$GLB,, | Performed by: PSYCHIATRY & NEUROLOGY

## 2021-01-21 RX ORDER — QUETIAPINE FUMARATE 100 MG/1
TABLET, FILM COATED ORAL
Qty: 60 TABLET | Refills: 5 | Status: SHIPPED | OUTPATIENT
Start: 2021-01-21 | End: 2021-07-28

## 2021-01-21 RX ORDER — FLUOXETINE HYDROCHLORIDE 20 MG/1
20 CAPSULE ORAL 2 TIMES DAILY
Qty: 60 CAPSULE | Refills: 5 | Status: SHIPPED | OUTPATIENT
Start: 2021-01-21 | End: 2021-07-14 | Stop reason: SDUPTHER

## 2021-01-21 RX ORDER — MIRTAZAPINE 30 MG/1
30 TABLET, FILM COATED ORAL NIGHTLY
Qty: 30 TABLET | Refills: 5 | Status: SHIPPED | OUTPATIENT
Start: 2021-01-21 | End: 2021-07-14 | Stop reason: SDUPTHER

## 2021-01-21 RX ORDER — ATORVASTATIN CALCIUM 10 MG/1
10 TABLET, FILM COATED ORAL DAILY
Qty: 90 TABLET | Refills: 1 | Status: SHIPPED | OUTPATIENT
Start: 2021-01-21 | End: 2021-07-20

## 2021-01-21 RX ORDER — DONEPEZIL HYDROCHLORIDE 10 MG/1
10 TABLET, FILM COATED ORAL NIGHTLY
Qty: 30 TABLET | Refills: 5 | Status: SHIPPED | OUTPATIENT
Start: 2021-01-21 | End: 2021-07-20

## 2021-01-21 RX ORDER — AMLODIPINE BESYLATE 5 MG/1
5 TABLET ORAL DAILY
Qty: 90 TABLET | Refills: 1 | Status: SHIPPED | OUTPATIENT
Start: 2021-01-21 | End: 2021-07-14 | Stop reason: SDUPTHER

## 2021-01-21 RX ORDER — QUETIAPINE FUMARATE 200 MG/1
200 TABLET, FILM COATED ORAL NIGHTLY
Qty: 30 TABLET | Refills: 5 | Status: SHIPPED | OUTPATIENT
Start: 2021-01-21 | End: 2021-07-14 | Stop reason: SDUPTHER

## 2021-01-22 ENCOUNTER — PATIENT MESSAGE (OUTPATIENT)
Dept: ADMINISTRATIVE | Facility: OTHER | Age: 76
End: 2021-01-22

## 2021-01-22 ENCOUNTER — OUTPATIENT CASE MANAGEMENT (OUTPATIENT)
Dept: NEUROLOGY | Facility: CLINIC | Age: 76
End: 2021-01-22

## 2021-01-25 PROBLEM — G30.1 LATE ONSET ALZHEIMER'S DISEASE WITH BEHAVIORAL DISTURBANCE: Status: ACTIVE | Noted: 2018-09-25

## 2021-01-25 PROBLEM — F02.818 LATE ONSET ALZHEIMER'S DISEASE WITH BEHAVIORAL DISTURBANCE: Status: ACTIVE | Noted: 2018-09-25

## 2021-02-09 ENCOUNTER — OFFICE VISIT (OUTPATIENT)
Dept: INTERNAL MEDICINE | Facility: CLINIC | Age: 76
End: 2021-02-09
Payer: MEDICARE

## 2021-02-09 ENCOUNTER — PATIENT OUTREACH (OUTPATIENT)
Dept: ADMINISTRATIVE | Facility: HOSPITAL | Age: 76
End: 2021-02-09

## 2021-02-09 VITALS
OXYGEN SATURATION: 98 % | HEIGHT: 72 IN | DIASTOLIC BLOOD PRESSURE: 78 MMHG | HEART RATE: 90 BPM | WEIGHT: 302 LBS | BODY MASS INDEX: 40.9 KG/M2 | SYSTOLIC BLOOD PRESSURE: 120 MMHG

## 2021-02-09 DIAGNOSIS — R31.9 HEMATURIA OF UNKNOWN CAUSE: ICD-10-CM

## 2021-02-09 PROCEDURE — 99999 PR PBB SHADOW E&M-EST. PATIENT-LVL IV: CPT | Mod: PBBFAC,HCNC,, | Performed by: INTERNAL MEDICINE

## 2021-02-09 PROCEDURE — 1159F PR MEDICATION LIST DOCUMENTED IN MEDICAL RECORD: ICD-10-PCS | Mod: HCNC,S$GLB,, | Performed by: INTERNAL MEDICINE

## 2021-02-09 PROCEDURE — 3074F PR MOST RECENT SYSTOLIC BLOOD PRESSURE < 130 MM HG: ICD-10-PCS | Mod: HCNC,CPTII,S$GLB, | Performed by: INTERNAL MEDICINE

## 2021-02-09 PROCEDURE — 99214 OFFICE O/P EST MOD 30 MIN: CPT | Mod: HCNC,S$GLB,, | Performed by: INTERNAL MEDICINE

## 2021-02-09 PROCEDURE — 81003 URINALYSIS AUTO W/O SCOPE: CPT | Mod: HCNC

## 2021-02-09 PROCEDURE — 99999 PR PBB SHADOW E&M-EST. PATIENT-LVL IV: ICD-10-PCS | Mod: PBBFAC,HCNC,, | Performed by: INTERNAL MEDICINE

## 2021-02-09 PROCEDURE — 3078F PR MOST RECENT DIASTOLIC BLOOD PRESSURE < 80 MM HG: ICD-10-PCS | Mod: HCNC,CPTII,S$GLB, | Performed by: INTERNAL MEDICINE

## 2021-02-09 PROCEDURE — 3008F PR BODY MASS INDEX (BMI) DOCUMENTED: ICD-10-PCS | Mod: HCNC,CPTII,S$GLB, | Performed by: INTERNAL MEDICINE

## 2021-02-09 PROCEDURE — 1159F MED LIST DOCD IN RCRD: CPT | Mod: HCNC,S$GLB,, | Performed by: INTERNAL MEDICINE

## 2021-02-09 PROCEDURE — 99214 PR OFFICE/OUTPT VISIT, EST, LEVL IV, 30-39 MIN: ICD-10-PCS | Mod: HCNC,S$GLB,, | Performed by: INTERNAL MEDICINE

## 2021-02-09 PROCEDURE — 3008F BODY MASS INDEX DOCD: CPT | Mod: HCNC,CPTII,S$GLB, | Performed by: INTERNAL MEDICINE

## 2021-02-09 PROCEDURE — 1126F AMNT PAIN NOTED NONE PRSNT: CPT | Mod: HCNC,S$GLB,, | Performed by: INTERNAL MEDICINE

## 2021-02-09 PROCEDURE — 3074F SYST BP LT 130 MM HG: CPT | Mod: HCNC,CPTII,S$GLB, | Performed by: INTERNAL MEDICINE

## 2021-02-09 PROCEDURE — 3078F DIAST BP <80 MM HG: CPT | Mod: HCNC,CPTII,S$GLB, | Performed by: INTERNAL MEDICINE

## 2021-02-09 PROCEDURE — 1126F PR PAIN SEVERITY QUANTIFIED, NO PAIN PRESENT: ICD-10-PCS | Mod: HCNC,S$GLB,, | Performed by: INTERNAL MEDICINE

## 2021-02-10 LAB
BILIRUB UR QL STRIP: NEGATIVE
CLARITY UR REFRACT.AUTO: ABNORMAL
COLOR UR AUTO: YELLOW
GLUCOSE UR QL STRIP: NEGATIVE
HGB UR QL STRIP: NEGATIVE
KETONES UR QL STRIP: NEGATIVE
LEUKOCYTE ESTERASE UR QL STRIP: NEGATIVE
NITRITE UR QL STRIP: NEGATIVE
PH UR STRIP: 5 [PH] (ref 5–8)
PROT UR QL STRIP: NEGATIVE
SP GR UR STRIP: 1.01 (ref 1–1.03)
URN SPEC COLLECT METH UR: ABNORMAL

## 2021-02-13 ENCOUNTER — IMMUNIZATION (OUTPATIENT)
Dept: PHARMACY | Facility: CLINIC | Age: 76
End: 2021-02-13
Payer: MEDICARE

## 2021-02-13 DIAGNOSIS — Z23 NEED FOR VACCINATION: Primary | ICD-10-CM

## 2021-02-17 ENCOUNTER — HOSPITAL ENCOUNTER (OUTPATIENT)
Dept: RADIOLOGY | Facility: HOSPITAL | Age: 76
Discharge: HOME | End: 2021-02-17
Attending: INTERNAL MEDICINE
Payer: MEDICARE

## 2021-02-17 DIAGNOSIS — R31.9 HEMATURIA OF UNKNOWN CAUSE: ICD-10-CM

## 2021-02-17 PROCEDURE — 74178 CT UROGRAM ABD PELVIS W WO: ICD-10-PCS | Mod: 26,HCNC,, | Performed by: RADIOLOGY

## 2021-02-17 PROCEDURE — 25500020 PHARM REV CODE 255: Mod: HCNC | Performed by: INTERNAL MEDICINE

## 2021-02-17 PROCEDURE — 74178 CT ABD&PLV WO CNTR FLWD CNTR: CPT | Mod: 26,HCNC,, | Performed by: RADIOLOGY

## 2021-02-17 PROCEDURE — 74178 CT ABD&PLV WO CNTR FLWD CNTR: CPT | Mod: TC,HCNC

## 2021-02-17 RX ADMIN — IOHEXOL 125 ML: 350 INJECTION, SOLUTION INTRAVENOUS at 04:02

## 2021-02-18 LAB
CREAT SERPL-MCNC: 1.2 MG/DL (ref 0.5–1.4)
SAMPLE: NORMAL

## 2021-02-22 ENCOUNTER — OUTPATIENT CASE MANAGEMENT (OUTPATIENT)
Dept: NEUROLOGY | Facility: CLINIC | Age: 76
End: 2021-02-22

## 2021-02-26 ENCOUNTER — PES CALL (OUTPATIENT)
Dept: ADMINISTRATIVE | Facility: CLINIC | Age: 76
End: 2021-02-26

## 2021-02-26 ENCOUNTER — PATIENT MESSAGE (OUTPATIENT)
Dept: INTERNAL MEDICINE | Facility: CLINIC | Age: 76
End: 2021-02-26

## 2021-03-09 ENCOUNTER — PATIENT OUTREACH (OUTPATIENT)
Dept: ADMINISTRATIVE | Facility: OTHER | Age: 76
End: 2021-03-09

## 2021-03-10 ENCOUNTER — OFFICE VISIT (OUTPATIENT)
Dept: ORTHOPEDICS | Facility: CLINIC | Age: 76
End: 2021-03-10
Payer: MEDICARE

## 2021-03-10 DIAGNOSIS — M17.0 PRIMARY OSTEOARTHRITIS OF BOTH KNEES: Primary | ICD-10-CM

## 2021-03-10 PROCEDURE — 1159F PR MEDICATION LIST DOCUMENTED IN MEDICAL RECORD: ICD-10-PCS | Mod: S$GLB,,, | Performed by: NURSE PRACTITIONER

## 2021-03-10 PROCEDURE — 99999 PR PBB SHADOW E&M-EST. PATIENT-LVL II: ICD-10-PCS | Mod: PBBFAC,,, | Performed by: NURSE PRACTITIONER

## 2021-03-10 PROCEDURE — 99213 OFFICE O/P EST LOW 20 MIN: CPT | Mod: 25,S$GLB,, | Performed by: NURSE PRACTITIONER

## 2021-03-10 PROCEDURE — 1159F MED LIST DOCD IN RCRD: CPT | Mod: S$GLB,,, | Performed by: NURSE PRACTITIONER

## 2021-03-10 PROCEDURE — 99999 PR PBB SHADOW E&M-EST. PATIENT-LVL II: CPT | Mod: PBBFAC,,, | Performed by: NURSE PRACTITIONER

## 2021-03-10 PROCEDURE — 20610 PR DRAIN/INJECT LARGE JOINT/BURSA: ICD-10-PCS | Mod: 50,S$GLB,, | Performed by: NURSE PRACTITIONER

## 2021-03-10 PROCEDURE — 20610 DRAIN/INJ JOINT/BURSA W/O US: CPT | Mod: 50,S$GLB,, | Performed by: NURSE PRACTITIONER

## 2021-03-10 PROCEDURE — 99213 PR OFFICE/OUTPT VISIT, EST, LEVL III, 20-29 MIN: ICD-10-PCS | Mod: 25,S$GLB,, | Performed by: NURSE PRACTITIONER

## 2021-03-10 RX ADMIN — TRIAMCINOLONE ACETONIDE 80 MG: 40 INJECTION, SUSPENSION INTRA-ARTICULAR; INTRAMUSCULAR at 09:03

## 2021-03-12 RX ORDER — TRIAMCINOLONE ACETONIDE 40 MG/ML
80 INJECTION, SUSPENSION INTRA-ARTICULAR; INTRAMUSCULAR
Status: COMPLETED | OUTPATIENT
Start: 2021-03-10 | End: 2021-03-10

## 2021-03-13 ENCOUNTER — IMMUNIZATION (OUTPATIENT)
Dept: PHARMACY | Facility: CLINIC | Age: 76
End: 2021-03-13
Payer: MEDICARE

## 2021-03-13 DIAGNOSIS — Z23 NEED FOR VACCINATION: Primary | ICD-10-CM

## 2021-03-15 ENCOUNTER — OFFICE VISIT (OUTPATIENT)
Dept: SLEEP MEDICINE | Facility: CLINIC | Age: 76
End: 2021-03-15
Payer: MEDICARE

## 2021-03-15 VITALS
DIASTOLIC BLOOD PRESSURE: 73 MMHG | SYSTOLIC BLOOD PRESSURE: 129 MMHG | BODY MASS INDEX: 40.02 KG/M2 | HEIGHT: 73 IN | HEART RATE: 62 BPM | WEIGHT: 302 LBS

## 2021-03-15 DIAGNOSIS — E78.5 HYPERLIPIDEMIA, UNSPECIFIED HYPERLIPIDEMIA TYPE: ICD-10-CM

## 2021-03-15 DIAGNOSIS — I10 ESSENTIAL HYPERTENSION: ICD-10-CM

## 2021-03-15 DIAGNOSIS — G47.33 OSA (OBSTRUCTIVE SLEEP APNEA): Primary | ICD-10-CM

## 2021-03-15 DIAGNOSIS — G30.1 LATE ONSET ALZHEIMER'S DISEASE WITH BEHAVIORAL DISTURBANCE: ICD-10-CM

## 2021-03-15 DIAGNOSIS — F02.818 LATE ONSET ALZHEIMER'S DISEASE WITH BEHAVIORAL DISTURBANCE: ICD-10-CM

## 2021-03-15 PROCEDURE — 1101F PT FALLS ASSESS-DOCD LE1/YR: CPT | Mod: CPTII,S$GLB,, | Performed by: INTERNAL MEDICINE

## 2021-03-15 PROCEDURE — 3288F FALL RISK ASSESSMENT DOCD: CPT | Mod: CPTII,S$GLB,, | Performed by: INTERNAL MEDICINE

## 2021-03-15 PROCEDURE — 3008F PR BODY MASS INDEX (BMI) DOCUMENTED: ICD-10-PCS | Mod: CPTII,S$GLB,, | Performed by: INTERNAL MEDICINE

## 2021-03-15 PROCEDURE — 1126F PR PAIN SEVERITY QUANTIFIED, NO PAIN PRESENT: ICD-10-PCS | Mod: S$GLB,,, | Performed by: INTERNAL MEDICINE

## 2021-03-15 PROCEDURE — 3074F SYST BP LT 130 MM HG: CPT | Mod: CPTII,S$GLB,, | Performed by: INTERNAL MEDICINE

## 2021-03-15 PROCEDURE — 1159F MED LIST DOCD IN RCRD: CPT | Mod: S$GLB,,, | Performed by: INTERNAL MEDICINE

## 2021-03-15 PROCEDURE — 1126F AMNT PAIN NOTED NONE PRSNT: CPT | Mod: S$GLB,,, | Performed by: INTERNAL MEDICINE

## 2021-03-15 PROCEDURE — 99213 PR OFFICE/OUTPT VISIT, EST, LEVL III, 20-29 MIN: ICD-10-PCS | Mod: S$GLB,,, | Performed by: INTERNAL MEDICINE

## 2021-03-15 PROCEDURE — 3288F PR FALLS RISK ASSESSMENT DOCUMENTED: ICD-10-PCS | Mod: CPTII,S$GLB,, | Performed by: INTERNAL MEDICINE

## 2021-03-15 PROCEDURE — 1101F PR PT FALLS ASSESS DOC 0-1 FALLS W/OUT INJ PAST YR: ICD-10-PCS | Mod: CPTII,S$GLB,, | Performed by: INTERNAL MEDICINE

## 2021-03-15 PROCEDURE — 3074F PR MOST RECENT SYSTOLIC BLOOD PRESSURE < 130 MM HG: ICD-10-PCS | Mod: CPTII,S$GLB,, | Performed by: INTERNAL MEDICINE

## 2021-03-15 PROCEDURE — 3078F DIAST BP <80 MM HG: CPT | Mod: CPTII,S$GLB,, | Performed by: INTERNAL MEDICINE

## 2021-03-15 PROCEDURE — 99999 PR PBB SHADOW E&M-EST. PATIENT-LVL IV: CPT | Mod: PBBFAC,,, | Performed by: INTERNAL MEDICINE

## 2021-03-15 PROCEDURE — 3008F BODY MASS INDEX DOCD: CPT | Mod: CPTII,S$GLB,, | Performed by: INTERNAL MEDICINE

## 2021-03-15 PROCEDURE — 3078F PR MOST RECENT DIASTOLIC BLOOD PRESSURE < 80 MM HG: ICD-10-PCS | Mod: CPTII,S$GLB,, | Performed by: INTERNAL MEDICINE

## 2021-03-15 PROCEDURE — 99999 PR PBB SHADOW E&M-EST. PATIENT-LVL IV: ICD-10-PCS | Mod: PBBFAC,,, | Performed by: INTERNAL MEDICINE

## 2021-03-15 PROCEDURE — 99213 OFFICE O/P EST LOW 20 MIN: CPT | Mod: S$GLB,,, | Performed by: INTERNAL MEDICINE

## 2021-03-15 PROCEDURE — 1159F PR MEDICATION LIST DOCUMENTED IN MEDICAL RECORD: ICD-10-PCS | Mod: S$GLB,,, | Performed by: INTERNAL MEDICINE

## 2021-03-17 ENCOUNTER — PATIENT MESSAGE (OUTPATIENT)
Dept: INTERNAL MEDICINE | Facility: CLINIC | Age: 76
End: 2021-03-17

## 2021-03-18 RX ORDER — METFORMIN HYDROCHLORIDE 1000 MG/1
1000 TABLET ORAL 2 TIMES DAILY WITH MEALS
Qty: 180 TABLET | Refills: 3 | Status: CANCELLED | OUTPATIENT
Start: 2021-03-18 | End: 2022-03-18

## 2021-03-21 ENCOUNTER — PATIENT MESSAGE (OUTPATIENT)
Dept: INTERNAL MEDICINE | Facility: CLINIC | Age: 76
End: 2021-03-21

## 2021-03-21 DIAGNOSIS — G30.9 ALZHEIMER'S DEMENTIA WITHOUT BEHAVIORAL DISTURBANCE, UNSPECIFIED TIMING OF DEMENTIA ONSET: ICD-10-CM

## 2021-03-21 DIAGNOSIS — F02.80 ALZHEIMER'S DEMENTIA WITHOUT BEHAVIORAL DISTURBANCE, UNSPECIFIED TIMING OF DEMENTIA ONSET: ICD-10-CM

## 2021-03-22 ENCOUNTER — PATIENT MESSAGE (OUTPATIENT)
Dept: INTERNAL MEDICINE | Facility: CLINIC | Age: 76
End: 2021-03-22

## 2021-03-22 ENCOUNTER — OUTPATIENT CASE MANAGEMENT (OUTPATIENT)
Dept: NEUROLOGY | Facility: CLINIC | Age: 76
End: 2021-03-22

## 2021-03-22 RX ORDER — MEMANTINE HYDROCHLORIDE 10 MG/1
10 TABLET ORAL 2 TIMES DAILY
Qty: 60 TABLET | Refills: 5 | Status: SHIPPED | OUTPATIENT
Start: 2021-03-22 | End: 2021-09-14

## 2021-03-23 RX ORDER — LISINOPRIL 20 MG/1
20 TABLET ORAL 2 TIMES DAILY
Qty: 180 TABLET | Refills: 3 | Status: SHIPPED | OUTPATIENT
Start: 2021-03-23 | End: 2022-03-29 | Stop reason: SDUPTHER

## 2021-03-24 RX ORDER — TAMSULOSIN HYDROCHLORIDE 0.4 MG/1
0.4 CAPSULE ORAL DAILY
Qty: 30 CAPSULE | Refills: 11 | Status: SHIPPED | OUTPATIENT
Start: 2021-03-24 | End: 2022-03-13

## 2021-04-05 ENCOUNTER — PATIENT MESSAGE (OUTPATIENT)
Dept: ADMINISTRATIVE | Facility: HOSPITAL | Age: 76
End: 2021-04-05

## 2021-04-15 ENCOUNTER — OFFICE VISIT (OUTPATIENT)
Dept: INTERNAL MEDICINE | Facility: CLINIC | Age: 76
End: 2021-04-15
Payer: MEDICARE

## 2021-04-15 ENCOUNTER — LAB VISIT (OUTPATIENT)
Dept: LAB | Facility: HOSPITAL | Age: 76
End: 2021-04-15
Attending: INTERNAL MEDICINE
Payer: MEDICARE

## 2021-04-15 VITALS
BODY MASS INDEX: 40.16 KG/M2 | DIASTOLIC BLOOD PRESSURE: 78 MMHG | HEIGHT: 73 IN | HEART RATE: 73 BPM | SYSTOLIC BLOOD PRESSURE: 130 MMHG | OXYGEN SATURATION: 98 % | WEIGHT: 303 LBS

## 2021-04-15 DIAGNOSIS — R19.5 LOOSE STOOLS: ICD-10-CM

## 2021-04-15 DIAGNOSIS — L03.115 BILATERAL LOWER LEG CELLULITIS: ICD-10-CM

## 2021-04-15 DIAGNOSIS — L03.116 BILATERAL LOWER LEG CELLULITIS: ICD-10-CM

## 2021-04-15 DIAGNOSIS — I87.2 CHRONIC VENOUS INSUFFICIENCY OF LOWER EXTREMITY: ICD-10-CM

## 2021-04-15 DIAGNOSIS — L03.115 BILATERAL LOWER LEG CELLULITIS: Primary | ICD-10-CM

## 2021-04-15 DIAGNOSIS — E11.9 TYPE 2 DIABETES MELLITUS WITHOUT COMPLICATION, WITHOUT LONG-TERM CURRENT USE OF INSULIN: ICD-10-CM

## 2021-04-15 DIAGNOSIS — L03.116 BILATERAL LOWER LEG CELLULITIS: Primary | ICD-10-CM

## 2021-04-15 LAB
ANION GAP SERPL CALC-SCNC: 9 MMOL/L (ref 8–16)
BASOPHILS # BLD AUTO: 0.05 K/UL (ref 0–0.2)
BASOPHILS NFR BLD: 0.8 % (ref 0–1.9)
BUN SERPL-MCNC: 19 MG/DL (ref 8–23)
CALCIUM SERPL-MCNC: 9 MG/DL (ref 8.7–10.5)
CHLORIDE SERPL-SCNC: 108 MMOL/L (ref 95–110)
CO2 SERPL-SCNC: 27 MMOL/L (ref 23–29)
CREAT SERPL-MCNC: 1 MG/DL (ref 0.5–1.4)
DIFFERENTIAL METHOD: ABNORMAL
EOSINOPHIL # BLD AUTO: 0.1 K/UL (ref 0–0.5)
EOSINOPHIL NFR BLD: 1.9 % (ref 0–8)
ERYTHROCYTE [DISTWIDTH] IN BLOOD BY AUTOMATED COUNT: 15.2 % (ref 11.5–14.5)
EST. GFR  (AFRICAN AMERICAN): >60 ML/MIN/1.73 M^2
EST. GFR  (NON AFRICAN AMERICAN): >60 ML/MIN/1.73 M^2
ESTIMATED AVG GLUCOSE: 111 MG/DL (ref 68–131)
GLUCOSE SERPL-MCNC: 101 MG/DL (ref 70–110)
HBA1C MFR BLD: 5.5 % (ref 4–5.6)
HCT VFR BLD AUTO: 43.6 % (ref 40–54)
HGB BLD-MCNC: 14.1 G/DL (ref 14–18)
IMM GRANULOCYTES # BLD AUTO: 0.02 K/UL (ref 0–0.04)
IMM GRANULOCYTES NFR BLD AUTO: 0.3 % (ref 0–0.5)
LYMPHOCYTES # BLD AUTO: 1.8 K/UL (ref 1–4.8)
LYMPHOCYTES NFR BLD: 30.4 % (ref 18–48)
MCH RBC QN AUTO: 29.7 PG (ref 27–31)
MCHC RBC AUTO-ENTMCNC: 32.3 G/DL (ref 32–36)
MCV RBC AUTO: 92 FL (ref 82–98)
MONOCYTES # BLD AUTO: 0.5 K/UL (ref 0.3–1)
MONOCYTES NFR BLD: 7.8 % (ref 4–15)
NEUTROPHILS # BLD AUTO: 3.5 K/UL (ref 1.8–7.7)
NEUTROPHILS NFR BLD: 58.8 % (ref 38–73)
NRBC BLD-RTO: 0 /100 WBC
PLATELET # BLD AUTO: 220 K/UL (ref 150–450)
PMV BLD AUTO: 9.8 FL (ref 9.2–12.9)
POTASSIUM SERPL-SCNC: 4.2 MMOL/L (ref 3.5–5.1)
RBC # BLD AUTO: 4.75 M/UL (ref 4.6–6.2)
SODIUM SERPL-SCNC: 144 MMOL/L (ref 136–145)
WBC # BLD AUTO: 5.93 K/UL (ref 3.9–12.7)

## 2021-04-15 PROCEDURE — 99499 UNLISTED E&M SERVICE: CPT | Mod: HCNC,S$GLB,, | Performed by: INTERNAL MEDICINE

## 2021-04-15 PROCEDURE — 1125F AMNT PAIN NOTED PAIN PRSNT: CPT | Mod: S$GLB,,, | Performed by: INTERNAL MEDICINE

## 2021-04-15 PROCEDURE — 3288F FALL RISK ASSESSMENT DOCD: CPT | Mod: CPTII,S$GLB,, | Performed by: INTERNAL MEDICINE

## 2021-04-15 PROCEDURE — 99214 OFFICE O/P EST MOD 30 MIN: CPT | Mod: S$GLB,,, | Performed by: INTERNAL MEDICINE

## 2021-04-15 PROCEDURE — 1101F PT FALLS ASSESS-DOCD LE1/YR: CPT | Mod: CPTII,S$GLB,, | Performed by: INTERNAL MEDICINE

## 2021-04-15 PROCEDURE — 1101F PR PT FALLS ASSESS DOC 0-1 FALLS W/OUT INJ PAST YR: ICD-10-PCS | Mod: CPTII,S$GLB,, | Performed by: INTERNAL MEDICINE

## 2021-04-15 PROCEDURE — 3008F BODY MASS INDEX DOCD: CPT | Mod: CPTII,S$GLB,, | Performed by: INTERNAL MEDICINE

## 2021-04-15 PROCEDURE — 83036 HEMOGLOBIN GLYCOSYLATED A1C: CPT | Performed by: INTERNAL MEDICINE

## 2021-04-15 PROCEDURE — 1159F MED LIST DOCD IN RCRD: CPT | Mod: S$GLB,,, | Performed by: INTERNAL MEDICINE

## 2021-04-15 PROCEDURE — 99999 PR PBB SHADOW E&M-EST. PATIENT-LVL IV: ICD-10-PCS | Mod: PBBFAC,,, | Performed by: INTERNAL MEDICINE

## 2021-04-15 PROCEDURE — 99214 PR OFFICE/OUTPT VISIT, EST, LEVL IV, 30-39 MIN: ICD-10-PCS | Mod: S$GLB,,, | Performed by: INTERNAL MEDICINE

## 2021-04-15 PROCEDURE — 99499 RISK ADDL DX/OHS AUDIT: ICD-10-PCS | Mod: HCNC,S$GLB,, | Performed by: INTERNAL MEDICINE

## 2021-04-15 PROCEDURE — 99999 PR PBB SHADOW E&M-EST. PATIENT-LVL IV: CPT | Mod: PBBFAC,,, | Performed by: INTERNAL MEDICINE

## 2021-04-15 PROCEDURE — 3288F PR FALLS RISK ASSESSMENT DOCUMENTED: ICD-10-PCS | Mod: CPTII,S$GLB,, | Performed by: INTERNAL MEDICINE

## 2021-04-15 PROCEDURE — 1125F PR PAIN SEVERITY QUANTIFIED, PAIN PRESENT: ICD-10-PCS | Mod: S$GLB,,, | Performed by: INTERNAL MEDICINE

## 2021-04-15 PROCEDURE — 1159F PR MEDICATION LIST DOCUMENTED IN MEDICAL RECORD: ICD-10-PCS | Mod: S$GLB,,, | Performed by: INTERNAL MEDICINE

## 2021-04-15 PROCEDURE — 85025 COMPLETE CBC W/AUTO DIFF WBC: CPT | Performed by: INTERNAL MEDICINE

## 2021-04-15 PROCEDURE — 80048 BASIC METABOLIC PNL TOTAL CA: CPT | Performed by: INTERNAL MEDICINE

## 2021-04-15 PROCEDURE — 3008F PR BODY MASS INDEX (BMI) DOCUMENTED: ICD-10-PCS | Mod: CPTII,S$GLB,, | Performed by: INTERNAL MEDICINE

## 2021-04-15 PROCEDURE — 36415 COLL VENOUS BLD VENIPUNCTURE: CPT | Performed by: INTERNAL MEDICINE

## 2021-04-15 RX ORDER — CEFDINIR 300 MG/1
300 CAPSULE ORAL 2 TIMES DAILY
Qty: 20 CAPSULE | Refills: 0 | Status: SHIPPED | OUTPATIENT
Start: 2021-04-15 | End: 2021-04-25

## 2021-04-21 ENCOUNTER — PATIENT OUTREACH (OUTPATIENT)
Dept: ADMINISTRATIVE | Facility: HOSPITAL | Age: 76
End: 2021-04-21

## 2021-04-21 ENCOUNTER — PATIENT MESSAGE (OUTPATIENT)
Dept: ADMINISTRATIVE | Facility: HOSPITAL | Age: 76
End: 2021-04-21

## 2021-04-22 ENCOUNTER — OUTPATIENT CASE MANAGEMENT (OUTPATIENT)
Dept: NEUROLOGY | Facility: CLINIC | Age: 76
End: 2021-04-22

## 2021-04-29 ENCOUNTER — TELEPHONE (OUTPATIENT)
Dept: INTERNAL MEDICINE | Facility: CLINIC | Age: 76
End: 2021-04-29

## 2021-04-30 ENCOUNTER — PATIENT MESSAGE (OUTPATIENT)
Dept: INTERNAL MEDICINE | Facility: CLINIC | Age: 76
End: 2021-04-30

## 2021-05-25 ENCOUNTER — OUTPATIENT CASE MANAGEMENT (OUTPATIENT)
Dept: NEUROLOGY | Facility: CLINIC | Age: 76
End: 2021-05-25

## 2021-05-29 RX ORDER — FUROSEMIDE 20 MG/1
20 TABLET ORAL DAILY
Qty: 90 TABLET | Refills: 1 | Status: SHIPPED | OUTPATIENT
Start: 2021-05-29

## 2021-06-02 ENCOUNTER — TELEPHONE (OUTPATIENT)
Dept: NEUROLOGY | Facility: CLINIC | Age: 76
End: 2021-06-02

## 2021-06-09 ENCOUNTER — PATIENT OUTREACH (OUTPATIENT)
Dept: ADMINISTRATIVE | Facility: OTHER | Age: 76
End: 2021-06-09

## 2021-06-11 ENCOUNTER — OFFICE VISIT (OUTPATIENT)
Dept: ORTHOPEDICS | Facility: CLINIC | Age: 76
End: 2021-06-11
Payer: MEDICARE

## 2021-06-11 DIAGNOSIS — M17.11 PRIMARY OSTEOARTHRITIS OF RIGHT KNEE: Primary | ICD-10-CM

## 2021-06-11 PROCEDURE — 1101F PT FALLS ASSESS-DOCD LE1/YR: CPT | Mod: CPTII,S$GLB,, | Performed by: NURSE PRACTITIONER

## 2021-06-11 PROCEDURE — 99499 UNLISTED E&M SERVICE: CPT | Mod: S$GLB,,, | Performed by: NURSE PRACTITIONER

## 2021-06-11 PROCEDURE — 20610 PR DRAIN/INJECT LARGE JOINT/BURSA: ICD-10-PCS | Mod: RT,S$GLB,, | Performed by: NURSE PRACTITIONER

## 2021-06-11 PROCEDURE — 3288F PR FALLS RISK ASSESSMENT DOCUMENTED: ICD-10-PCS | Mod: CPTII,S$GLB,, | Performed by: NURSE PRACTITIONER

## 2021-06-11 PROCEDURE — 3288F FALL RISK ASSESSMENT DOCD: CPT | Mod: CPTII,S$GLB,, | Performed by: NURSE PRACTITIONER

## 2021-06-11 PROCEDURE — 20610 DRAIN/INJ JOINT/BURSA W/O US: CPT | Mod: RT,S$GLB,, | Performed by: NURSE PRACTITIONER

## 2021-06-11 PROCEDURE — 1101F PR PT FALLS ASSESS DOC 0-1 FALLS W/OUT INJ PAST YR: ICD-10-PCS | Mod: CPTII,S$GLB,, | Performed by: NURSE PRACTITIONER

## 2021-06-11 PROCEDURE — 99499 NO LOS: ICD-10-PCS | Mod: S$GLB,,, | Performed by: NURSE PRACTITIONER

## 2021-06-11 PROCEDURE — 99999 PR PBB SHADOW E&M-EST. PATIENT-LVL III: ICD-10-PCS | Mod: PBBFAC,,, | Performed by: NURSE PRACTITIONER

## 2021-06-11 PROCEDURE — 99999 PR PBB SHADOW E&M-EST. PATIENT-LVL III: CPT | Mod: PBBFAC,,, | Performed by: NURSE PRACTITIONER

## 2021-06-11 RX ADMIN — TRIAMCINOLONE ACETONIDE 40 MG: 40 INJECTION, SUSPENSION INTRA-ARTICULAR; INTRAMUSCULAR at 08:06

## 2021-06-15 RX ORDER — TRIAMCINOLONE ACETONIDE 40 MG/ML
40 INJECTION, SUSPENSION INTRA-ARTICULAR; INTRAMUSCULAR
Status: COMPLETED | OUTPATIENT
Start: 2021-06-11 | End: 2021-06-11

## 2021-06-23 ENCOUNTER — PES CALL (OUTPATIENT)
Dept: ADMINISTRATIVE | Facility: CLINIC | Age: 76
End: 2021-06-23

## 2021-06-24 ENCOUNTER — OUTPATIENT CASE MANAGEMENT (OUTPATIENT)
Dept: NEUROLOGY | Facility: CLINIC | Age: 76
End: 2021-06-24

## 2021-07-06 ENCOUNTER — PATIENT MESSAGE (OUTPATIENT)
Dept: ADMINISTRATIVE | Facility: HOSPITAL | Age: 76
End: 2021-07-06

## 2021-07-14 ENCOUNTER — OUTPATIENT CASE MANAGEMENT (OUTPATIENT)
Dept: NEUROLOGY | Facility: CLINIC | Age: 76
End: 2021-07-14

## 2021-07-14 ENCOUNTER — OFFICE VISIT (OUTPATIENT)
Dept: NEUROLOGY | Facility: CLINIC | Age: 76
End: 2021-07-14
Payer: MEDICARE

## 2021-07-14 VITALS
HEIGHT: 73 IN | BODY MASS INDEX: 41.75 KG/M2 | HEART RATE: 87 BPM | WEIGHT: 315 LBS | SYSTOLIC BLOOD PRESSURE: 106 MMHG | DIASTOLIC BLOOD PRESSURE: 63 MMHG

## 2021-07-14 DIAGNOSIS — F02.818 LATE ONSET ALZHEIMER'S DISEASE WITH BEHAVIORAL DISTURBANCE: Primary | ICD-10-CM

## 2021-07-14 DIAGNOSIS — G30.1 LATE ONSET ALZHEIMER'S DISEASE WITH BEHAVIORAL DISTURBANCE: Primary | ICD-10-CM

## 2021-07-14 DIAGNOSIS — R25.1 TREMOR: ICD-10-CM

## 2021-07-14 DIAGNOSIS — H90.3 SENSORINEURAL HEARING LOSS (SNHL) OF BOTH EARS: ICD-10-CM

## 2021-07-14 PROCEDURE — 99499 RISK ADDL DX/OHS AUDIT: ICD-10-PCS | Mod: HCNC,S$GLB,, | Performed by: NURSE PRACTITIONER

## 2021-07-14 PROCEDURE — 3074F PR MOST RECENT SYSTOLIC BLOOD PRESSURE < 130 MM HG: ICD-10-PCS | Mod: CPTII,S$GLB,, | Performed by: PSYCHIATRY & NEUROLOGY

## 2021-07-14 PROCEDURE — 1160F RVW MEDS BY RX/DR IN RCRD: CPT | Mod: CPTII,S$GLB,, | Performed by: PSYCHIATRY & NEUROLOGY

## 2021-07-14 PROCEDURE — 3044F PR MOST RECENT HEMOGLOBIN A1C LEVEL <7.0%: ICD-10-PCS | Mod: CPTII,S$GLB,, | Performed by: PSYCHIATRY & NEUROLOGY

## 2021-07-14 PROCEDURE — 99215 PR OFFICE/OUTPT VISIT, EST, LEVL V, 40-54 MIN: ICD-10-PCS | Mod: S$GLB,,, | Performed by: NURSE PRACTITIONER

## 2021-07-14 PROCEDURE — 99499 UNLISTED E&M SERVICE: CPT | Mod: S$GLB,,, | Performed by: PSYCHIATRY & NEUROLOGY

## 2021-07-14 PROCEDURE — 99215 OFFICE O/P EST HI 40 MIN: CPT | Mod: S$GLB,,, | Performed by: NURSE PRACTITIONER

## 2021-07-14 PROCEDURE — 96116 PR NEUROBEHAVIORAL STATUS EXAM BY PSYCH/PHYS: ICD-10-PCS | Mod: S$GLB,,, | Performed by: PSYCHIATRY & NEUROLOGY

## 2021-07-14 PROCEDURE — 1159F MED LIST DOCD IN RCRD: CPT | Mod: CPTII,S$GLB,, | Performed by: PSYCHIATRY & NEUROLOGY

## 2021-07-14 PROCEDURE — 3288F FALL RISK ASSESSMENT DOCD: CPT | Mod: CPTII,S$GLB,, | Performed by: PSYCHIATRY & NEUROLOGY

## 2021-07-14 PROCEDURE — 1160F PR REVIEW ALL MEDS BY PRESCRIBER/CLIN PHARMACIST DOCUMENTED: ICD-10-PCS | Mod: CPTII,S$GLB,, | Performed by: PSYCHIATRY & NEUROLOGY

## 2021-07-14 PROCEDURE — 1101F PT FALLS ASSESS-DOCD LE1/YR: CPT | Mod: CPTII,S$GLB,, | Performed by: PSYCHIATRY & NEUROLOGY

## 2021-07-14 PROCEDURE — 3078F PR MOST RECENT DIASTOLIC BLOOD PRESSURE < 80 MM HG: ICD-10-PCS | Mod: CPTII,S$GLB,, | Performed by: PSYCHIATRY & NEUROLOGY

## 2021-07-14 PROCEDURE — 3078F DIAST BP <80 MM HG: CPT | Mod: CPTII,S$GLB,, | Performed by: PSYCHIATRY & NEUROLOGY

## 2021-07-14 PROCEDURE — 99999 PR PBB SHADOW E&M-EST. PATIENT-LVL II: CPT | Mod: PBBFAC,,,

## 2021-07-14 PROCEDURE — 99999 PR PBB SHADOW E&M-EST. PATIENT-LVL II: ICD-10-PCS | Mod: PBBFAC,,,

## 2021-07-14 PROCEDURE — 99499 NO LOS: ICD-10-PCS | Mod: S$GLB,,, | Performed by: PSYCHIATRY & NEUROLOGY

## 2021-07-14 PROCEDURE — 3074F SYST BP LT 130 MM HG: CPT | Mod: CPTII,S$GLB,, | Performed by: PSYCHIATRY & NEUROLOGY

## 2021-07-14 PROCEDURE — 1126F PR PAIN SEVERITY QUANTIFIED, NO PAIN PRESENT: ICD-10-PCS | Mod: CPTII,S$GLB,, | Performed by: PSYCHIATRY & NEUROLOGY

## 2021-07-14 PROCEDURE — 3288F PR FALLS RISK ASSESSMENT DOCUMENTED: ICD-10-PCS | Mod: CPTII,S$GLB,, | Performed by: PSYCHIATRY & NEUROLOGY

## 2021-07-14 PROCEDURE — 3044F HG A1C LEVEL LT 7.0%: CPT | Mod: CPTII,S$GLB,, | Performed by: PSYCHIATRY & NEUROLOGY

## 2021-07-14 PROCEDURE — 1101F PR PT FALLS ASSESS DOC 0-1 FALLS W/OUT INJ PAST YR: ICD-10-PCS | Mod: CPTII,S$GLB,, | Performed by: PSYCHIATRY & NEUROLOGY

## 2021-07-14 PROCEDURE — 1159F PR MEDICATION LIST DOCUMENTED IN MEDICAL RECORD: ICD-10-PCS | Mod: CPTII,S$GLB,, | Performed by: PSYCHIATRY & NEUROLOGY

## 2021-07-14 PROCEDURE — 1126F AMNT PAIN NOTED NONE PRSNT: CPT | Mod: CPTII,S$GLB,, | Performed by: PSYCHIATRY & NEUROLOGY

## 2021-07-14 PROCEDURE — 99499 UNLISTED E&M SERVICE: CPT | Mod: HCNC,S$GLB,, | Performed by: NURSE PRACTITIONER

## 2021-07-14 PROCEDURE — 96116 NUBHVL XM PHYS/QHP 1ST HR: CPT | Mod: S$GLB,,, | Performed by: PSYCHIATRY & NEUROLOGY

## 2021-07-15 RX ORDER — MIRTAZAPINE 30 MG/1
30 TABLET, FILM COATED ORAL NIGHTLY
Qty: 30 TABLET | Refills: 5 | Status: SHIPPED | OUTPATIENT
Start: 2021-07-15 | End: 2022-01-11

## 2021-07-15 RX ORDER — AMLODIPINE BESYLATE 5 MG/1
5 TABLET ORAL DAILY
Qty: 90 TABLET | Refills: 1 | Status: SHIPPED | OUTPATIENT
Start: 2021-07-15 | End: 2022-01-09

## 2021-07-15 RX ORDER — QUETIAPINE FUMARATE 200 MG/1
200 TABLET, FILM COATED ORAL NIGHTLY
Qty: 30 TABLET | Refills: 5 | Status: SHIPPED | OUTPATIENT
Start: 2021-07-15 | End: 2022-01-18 | Stop reason: SDUPTHER

## 2021-07-20 RX ORDER — ATORVASTATIN CALCIUM 10 MG/1
TABLET, FILM COATED ORAL
Qty: 90 TABLET | Refills: 1 | Status: SHIPPED | OUTPATIENT
Start: 2021-07-20 | End: 2022-01-09

## 2021-07-20 RX ORDER — DONEPEZIL HYDROCHLORIDE 10 MG/1
10 TABLET, FILM COATED ORAL NIGHTLY
Qty: 90 TABLET | Refills: 1 | Status: SHIPPED | OUTPATIENT
Start: 2021-07-20 | End: 2022-01-09

## 2021-07-22 ENCOUNTER — PATIENT MESSAGE (OUTPATIENT)
Dept: SLEEP MEDICINE | Facility: CLINIC | Age: 76
End: 2021-07-22

## 2021-07-26 PROBLEM — R25.1 TREMOR: Status: ACTIVE | Noted: 2021-07-26

## 2021-07-27 ENCOUNTER — PATIENT MESSAGE (OUTPATIENT)
Dept: INTERNAL MEDICINE | Facility: CLINIC | Age: 76
End: 2021-07-27

## 2021-07-28 ENCOUNTER — HOSPITAL ENCOUNTER (EMERGENCY)
Facility: HOSPITAL | Age: 76
Discharge: HOME | End: 2021-07-28
Attending: EMERGENCY MEDICINE
Payer: MEDICARE

## 2021-07-28 VITALS
TEMPERATURE: 98 F | DIASTOLIC BLOOD PRESSURE: 82 MMHG | OXYGEN SATURATION: 96 % | SYSTOLIC BLOOD PRESSURE: 170 MMHG | RESPIRATION RATE: 18 BRPM | BODY MASS INDEX: 45.1 KG/M2 | HEIGHT: 70 IN | WEIGHT: 315 LBS | HEART RATE: 65 BPM

## 2021-07-28 DIAGNOSIS — R51.9 NONINTRACTABLE HEADACHE, UNSPECIFIED CHRONICITY PATTERN, UNSPECIFIED HEADACHE TYPE: Primary | ICD-10-CM

## 2021-07-28 LAB
CTP QC/QA: YES
SARS-COV-2 RDRP RESP QL NAA+PROBE: NEGATIVE

## 2021-07-28 PROCEDURE — 99284 PR EMERGENCY DEPT VISIT,LEVEL IV: ICD-10-PCS | Mod: CS,,, | Performed by: PHYSICIAN ASSISTANT

## 2021-07-28 PROCEDURE — 99284 EMERGENCY DEPT VISIT MOD MDM: CPT | Mod: CS,,, | Performed by: PHYSICIAN ASSISTANT

## 2021-07-28 PROCEDURE — 99284 EMERGENCY DEPT VISIT MOD MDM: CPT

## 2021-07-28 PROCEDURE — U0002 COVID-19 LAB TEST NON-CDC: HCPCS | Performed by: EMERGENCY MEDICINE

## 2021-08-11 ENCOUNTER — PATIENT MESSAGE (OUTPATIENT)
Dept: INTERNAL MEDICINE | Facility: CLINIC | Age: 76
End: 2021-08-11

## 2021-08-12 RX ORDER — PREDNISONE 20 MG/1
TABLET ORAL
Qty: 9 TABLET | Refills: 0 | Status: SHIPPED | OUTPATIENT
Start: 2021-08-12 | End: 2021-11-24

## 2021-08-12 RX ORDER — DICLOFENAC SODIUM 75 MG/1
75 TABLET, DELAYED RELEASE ORAL 2 TIMES DAILY
Qty: 14 TABLET | Refills: 0 | Status: SHIPPED | OUTPATIENT
Start: 2021-08-12 | End: 2021-08-19

## 2021-08-12 RX ORDER — AZELASTINE 1 MG/ML
2 SPRAY, METERED NASAL 2 TIMES DAILY
Qty: 30 ML | Refills: 0 | Status: ON HOLD | OUTPATIENT
Start: 2021-08-12 | End: 2022-06-15

## 2021-08-19 ENCOUNTER — OUTPATIENT CASE MANAGEMENT (OUTPATIENT)
Dept: NEUROLOGY | Facility: CLINIC | Age: 76
End: 2021-08-19

## 2021-09-07 ENCOUNTER — TELEPHONE (OUTPATIENT)
Dept: INTERNAL MEDICINE | Facility: CLINIC | Age: 76
End: 2021-09-07

## 2021-09-07 RX ORDER — CLINDAMYCIN HYDROCHLORIDE 300 MG/1
300 CAPSULE ORAL 3 TIMES DAILY
Qty: 30 CAPSULE | Refills: 0 | Status: SHIPPED | OUTPATIENT
Start: 2021-09-07 | End: 2021-09-17

## 2021-09-08 ENCOUNTER — OUTPATIENT CASE MANAGEMENT (OUTPATIENT)
Dept: NEUROLOGY | Facility: CLINIC | Age: 76
End: 2021-09-08

## 2021-09-20 ENCOUNTER — OUTPATIENT CASE MANAGEMENT (OUTPATIENT)
Dept: NEUROLOGY | Facility: CLINIC | Age: 76
End: 2021-09-20

## 2021-09-22 ENCOUNTER — OUTPATIENT CASE MANAGEMENT (OUTPATIENT)
Dept: NEUROLOGY | Facility: CLINIC | Age: 76
End: 2021-09-22

## 2021-09-25 ENCOUNTER — PATIENT MESSAGE (OUTPATIENT)
Dept: INTERNAL MEDICINE | Facility: CLINIC | Age: 76
End: 2021-09-25

## 2021-09-25 RX ORDER — LAMOTRIGINE 100 MG/1
100 TABLET ORAL 2 TIMES DAILY
Qty: 180 TABLET | Refills: 1 | Status: CANCELLED | OUTPATIENT
Start: 2021-09-25

## 2021-09-27 RX ORDER — LAMOTRIGINE 100 MG/1
100 TABLET ORAL 2 TIMES DAILY
Qty: 180 TABLET | Refills: 1 | Status: SHIPPED | OUTPATIENT
Start: 2021-09-27 | End: 2022-03-18 | Stop reason: SDUPTHER

## 2021-10-05 ENCOUNTER — PATIENT MESSAGE (OUTPATIENT)
Dept: ADMINISTRATIVE | Facility: HOSPITAL | Age: 76
End: 2021-10-05

## 2021-10-19 ENCOUNTER — OUTPATIENT CASE MANAGEMENT (OUTPATIENT)
Dept: NEUROLOGY | Facility: CLINIC | Age: 76
End: 2021-10-19

## 2021-10-21 ENCOUNTER — PATIENT MESSAGE (OUTPATIENT)
Dept: SLEEP MEDICINE | Facility: CLINIC | Age: 76
End: 2021-10-21
Payer: MEDICARE

## 2021-10-25 NOTE — TELEPHONE ENCOUNTER
----- Message from Yara Bauer sent at 2/16/2018 12:32 PM CST -----  Contact: Sari/ wife [phone]   Pt wife called in regards to checking the status of her first message that she left in regard to. Pt wife was reading the avs and it says left extremity but it should be right. On the synopsis of the visit, everything says left and it should be right. Please call her .      Please advise   none

## 2021-10-26 ENCOUNTER — PATIENT OUTREACH (OUTPATIENT)
Dept: ADMINISTRATIVE | Facility: OTHER | Age: 76
End: 2021-10-26
Payer: MEDICARE

## 2021-10-26 DIAGNOSIS — E11.9 DIABETES MELLITUS WITHOUT COMPLICATION: Primary | ICD-10-CM

## 2021-10-28 ENCOUNTER — OFFICE VISIT (OUTPATIENT)
Dept: ORTHOPEDICS | Facility: CLINIC | Age: 76
End: 2021-10-28
Payer: MEDICARE

## 2021-10-28 DIAGNOSIS — M17.0 PRIMARY OSTEOARTHRITIS OF BOTH KNEES: ICD-10-CM

## 2021-10-28 PROCEDURE — 1159F MED LIST DOCD IN RCRD: CPT | Mod: HCNC,CPTII,S$GLB, | Performed by: NURSE PRACTITIONER

## 2021-10-28 PROCEDURE — 99499 UNLISTED E&M SERVICE: CPT | Mod: HCNC,S$GLB,, | Performed by: NURSE PRACTITIONER

## 2021-10-28 PROCEDURE — 1160F PR REVIEW ALL MEDS BY PRESCRIBER/CLIN PHARMACIST DOCUMENTED: ICD-10-PCS | Mod: HCNC,CPTII,S$GLB, | Performed by: NURSE PRACTITIONER

## 2021-10-28 PROCEDURE — 99999 PR PBB SHADOW E&M-EST. PATIENT-LVL II: ICD-10-PCS | Mod: PBBFAC,HCNC,, | Performed by: NURSE PRACTITIONER

## 2021-10-28 PROCEDURE — 4010F PR ACE/ARB THEARPY RXD/TAKEN: ICD-10-PCS | Mod: HCNC,CPTII,S$GLB, | Performed by: NURSE PRACTITIONER

## 2021-10-28 PROCEDURE — 3044F HG A1C LEVEL LT 7.0%: CPT | Mod: HCNC,CPTII,S$GLB, | Performed by: NURSE PRACTITIONER

## 2021-10-28 PROCEDURE — 3044F PR MOST RECENT HEMOGLOBIN A1C LEVEL <7.0%: ICD-10-PCS | Mod: HCNC,CPTII,S$GLB, | Performed by: NURSE PRACTITIONER

## 2021-10-28 PROCEDURE — 4010F ACE/ARB THERAPY RXD/TAKEN: CPT | Mod: HCNC,CPTII,S$GLB, | Performed by: NURSE PRACTITIONER

## 2021-10-28 PROCEDURE — 99999 PR PBB SHADOW E&M-EST. PATIENT-LVL II: CPT | Mod: PBBFAC,HCNC,, | Performed by: NURSE PRACTITIONER

## 2021-10-28 PROCEDURE — 99499 NO LOS: ICD-10-PCS | Mod: HCNC,S$GLB,, | Performed by: NURSE PRACTITIONER

## 2021-10-28 PROCEDURE — 99499 UNLISTED E&M SERVICE: CPT | Mod: S$GLB,,, | Performed by: NURSE PRACTITIONER

## 2021-10-28 PROCEDURE — 20610 PR DRAIN/INJECT LARGE JOINT/BURSA: ICD-10-PCS | Mod: 50,HCNC,S$GLB, | Performed by: NURSE PRACTITIONER

## 2021-10-28 PROCEDURE — 1159F PR MEDICATION LIST DOCUMENTED IN MEDICAL RECORD: ICD-10-PCS | Mod: HCNC,CPTII,S$GLB, | Performed by: NURSE PRACTITIONER

## 2021-10-28 PROCEDURE — 1160F RVW MEDS BY RX/DR IN RCRD: CPT | Mod: HCNC,CPTII,S$GLB, | Performed by: NURSE PRACTITIONER

## 2021-10-28 PROCEDURE — 20610 DRAIN/INJ JOINT/BURSA W/O US: CPT | Mod: 50,HCNC,S$GLB, | Performed by: NURSE PRACTITIONER

## 2021-10-28 RX ADMIN — TRIAMCINOLONE ACETONIDE 80 MG: 40 INJECTION, SUSPENSION INTRA-ARTICULAR; INTRAMUSCULAR at 02:10

## 2021-10-29 ENCOUNTER — TELEPHONE (OUTPATIENT)
Dept: SLEEP MEDICINE | Facility: CLINIC | Age: 76
End: 2021-10-29
Payer: MEDICARE

## 2021-11-01 RX ORDER — TRIAMCINOLONE ACETONIDE 40 MG/ML
80 INJECTION, SUSPENSION INTRA-ARTICULAR; INTRAMUSCULAR
Status: COMPLETED | OUTPATIENT
Start: 2021-10-28 | End: 2021-10-28

## 2021-11-19 ENCOUNTER — TELEPHONE (OUTPATIENT)
Dept: NEUROLOGY | Facility: CLINIC | Age: 76
End: 2021-11-19
Payer: MEDICARE

## 2021-11-23 ENCOUNTER — PATIENT MESSAGE (OUTPATIENT)
Dept: INTERNAL MEDICINE | Facility: CLINIC | Age: 76
End: 2021-11-23
Payer: MEDICARE

## 2021-11-24 ENCOUNTER — TELEPHONE (OUTPATIENT)
Dept: NEUROLOGY | Facility: CLINIC | Age: 76
End: 2021-11-24
Payer: MEDICARE

## 2021-11-24 ENCOUNTER — PATIENT MESSAGE (OUTPATIENT)
Dept: INTERNAL MEDICINE | Facility: CLINIC | Age: 76
End: 2021-11-24
Payer: MEDICARE

## 2021-11-24 RX ORDER — TIZANIDINE 4 MG/1
4 TABLET ORAL EVERY 8 HOURS PRN
Qty: 21 TABLET | Refills: 0 | Status: SHIPPED | OUTPATIENT
Start: 2021-11-24 | End: 2022-07-15

## 2021-12-01 ENCOUNTER — TELEPHONE (OUTPATIENT)
Dept: NEUROLOGY | Facility: CLINIC | Age: 76
End: 2021-12-01
Payer: MEDICARE

## 2021-12-10 ENCOUNTER — OUTPATIENT CASE MANAGEMENT (OUTPATIENT)
Dept: NEUROLOGY | Facility: CLINIC | Age: 76
End: 2021-12-10
Payer: MEDICARE

## 2021-12-20 ENCOUNTER — HOSPITAL ENCOUNTER (EMERGENCY)
Facility: HOSPITAL | Age: 76
Discharge: HOME | End: 2021-12-21
Attending: EMERGENCY MEDICINE
Payer: MEDICARE

## 2021-12-20 VITALS
OXYGEN SATURATION: 95 % | RESPIRATION RATE: 20 BRPM | WEIGHT: 310 LBS | SYSTOLIC BLOOD PRESSURE: 157 MMHG | BODY MASS INDEX: 44.38 KG/M2 | HEIGHT: 70 IN | TEMPERATURE: 99 F | DIASTOLIC BLOOD PRESSURE: 72 MMHG | HEART RATE: 82 BPM

## 2021-12-20 DIAGNOSIS — R09.A2 FOREIGN BODY SENSATION IN THROAT: Primary | ICD-10-CM

## 2021-12-20 PROCEDURE — 99284 EMERGENCY DEPT VISIT MOD MDM: CPT | Mod: HCNC,,, | Performed by: EMERGENCY MEDICINE

## 2021-12-20 PROCEDURE — 99284 PR EMERGENCY DEPT VISIT,LEVEL IV: ICD-10-PCS | Mod: HCNC,,, | Performed by: EMERGENCY MEDICINE

## 2021-12-20 PROCEDURE — 99282 EMERGENCY DEPT VISIT SF MDM: CPT | Mod: HCNC

## 2021-12-20 RX ORDER — FUROSEMIDE 20 MG/1
TABLET ORAL
Qty: 90 TABLET | Refills: 1 | Status: ON HOLD | OUTPATIENT
Start: 2021-12-20 | End: 2022-06-15

## 2022-01-06 NOTE — TELEPHONE ENCOUNTER
No new care gaps identified.  Powered by [employer]. Reference number: [id number].   1/06/2022 4:53:34 PM CST

## 2022-01-09 RX ORDER — ATORVASTATIN CALCIUM 10 MG/1
TABLET, FILM COATED ORAL
Qty: 90 TABLET | Refills: 1 | Status: SHIPPED | OUTPATIENT
Start: 2022-01-09 | End: 2022-07-01

## 2022-01-09 RX ORDER — DONEPEZIL HYDROCHLORIDE 10 MG/1
TABLET, FILM COATED ORAL
Qty: 90 TABLET | Refills: 1 | Status: SHIPPED | OUTPATIENT
Start: 2022-01-09 | End: 2022-07-01

## 2022-01-09 RX ORDER — AMLODIPINE BESYLATE 5 MG/1
TABLET ORAL
Qty: 90 TABLET | Refills: 1 | Status: SHIPPED | OUTPATIENT
Start: 2022-01-09 | End: 2022-05-04

## 2022-01-10 NOTE — PROGRESS NOTES
Patient had venous Doppler because of persistent pain, swelling, redness involving the left leg  Negative for DVT, he is now on clindamycin, previously on 2 courses of Bactrim, despite this ongoing pain swelling redness involving the leg    Will set up referral to infectious disease due to chronic cellulitis  Repeat labs regarding chemistry CBC and hemoglobin A1c    His medication for stasis edema was changed from lisinopril-HCT to lisinopril 20 mg and furosemide 20 mg and he did received 2 courses of metolazone with this 5 mg once a day for 5 days each   PATIENT CALLED STATING THAT SHE HAS A RED RASH ON THE INSIDE OF HER LEGS, ITCHING, HER EYES ARE SWOLLEN, AND SNEEZING. SHE THINKS THAT IT COULD BE A REACTION TO A MEDICATION THAT SHE'S TAKING.    SHE DID GO TO THE URGENT CARE ON 01/08/22, WHICH ADVISED HER TO GO TO THE HOSPITAL - WHICH THE PATIENT DID NOT DO.    SHE WOULD LIKE TO BE CONTACTED BY EITHER REECE [facility]O OR CLINICAL TO DISCUSS THIS ISSUE.    SHE MENTIONED THAT HER BLOOD PRESSURE /?? - BUT SAYS THAT SHE DID NOT TAKE HER BLOOD PRESSURE MEDICATION PRIOR TO THE VISIT, AND WHEN SHE GOT HOME SHE TOOK THE MEDICATION WHICH LOWERED THE BLOOD PRESSURE TO NORMAL RANGE.    PATIENT HAS A 5 DAY SUPPLY OF cloNIDine (CATAPRES) 0.1 MG tablet BUT WASN'T SURE IF [facility] WANTED HER TO HAVE A REFILL OR INCREASE THE DOSAGE.    PLEASE ADVISE  [phone]

## 2022-01-11 RX ORDER — MIRTAZAPINE 30 MG/1
TABLET, FILM COATED ORAL
Qty: 30 TABLET | Refills: 5 | Status: ON HOLD | OUTPATIENT
Start: 2022-01-11 | End: 2022-06-17 | Stop reason: HOSPADM

## 2022-01-11 RX ORDER — FLUOXETINE HYDROCHLORIDE 20 MG/1
CAPSULE ORAL
Qty: 60 CAPSULE | Refills: 5 | Status: SHIPPED | OUTPATIENT
Start: 2022-01-11 | End: 2022-08-01 | Stop reason: SDUPTHER

## 2022-01-11 NOTE — TELEPHONE ENCOUNTER
No new care gaps identified.  Powered by [employer]. Reference number: [id number].   1/10/2022 6:12:37 PM CST

## 2022-01-13 ENCOUNTER — PATIENT MESSAGE (OUTPATIENT)
Dept: INTERNAL MEDICINE | Facility: CLINIC | Age: 77
End: 2022-01-13
Payer: MEDICARE

## 2022-01-13 RX ORDER — METHOCARBAMOL 500 MG/1
500 TABLET, FILM COATED ORAL 3 TIMES DAILY
Qty: 30 TABLET | Refills: 0 | Status: SHIPPED | OUTPATIENT
Start: 2022-01-13 | End: 2022-01-23

## 2022-01-13 RX ORDER — DICLOFENAC SODIUM 75 MG/1
75 TABLET, DELAYED RELEASE ORAL 2 TIMES DAILY
Qty: 20 TABLET | Refills: 0 | Status: SHIPPED | OUTPATIENT
Start: 2022-01-13 | End: 2022-01-23

## 2022-01-14 ENCOUNTER — PATIENT MESSAGE (OUTPATIENT)
Dept: ADMINISTRATIVE | Facility: HOSPITAL | Age: 77
End: 2022-01-14
Payer: MEDICARE

## 2022-01-14 NOTE — TELEPHONE ENCOUNTER
Call wife on Friday it may not be necessary for the patient, and he might not be able to.  I sent in medications

## 2022-01-26 ENCOUNTER — PATIENT MESSAGE (OUTPATIENT)
Dept: ADMINISTRATIVE | Facility: HOSPITAL | Age: 77
End: 2022-01-26
Payer: MEDICARE

## 2022-01-26 DIAGNOSIS — E11.9 TYPE 2 DIABETES MELLITUS WITHOUT COMPLICATION: ICD-10-CM

## 2022-02-24 ENCOUNTER — PATIENT MESSAGE (OUTPATIENT)
Dept: INTERNAL MEDICINE | Facility: CLINIC | Age: 77
End: 2022-02-24
Payer: MEDICARE

## 2022-03-05 NOTE — TELEPHONE ENCOUNTER
Care Due:                  Date            Visit Type   Department     Provider  --------------------------------------------------------------------------------                                SAME DAY -                              ESTABLISHED   [facility] PRIMARY  Last Visit: 04-      PATIENT      CARE           Artem Hood  Next Visit: None Scheduled  None         None Found                                                            Last  Test          Frequency    Reason                     Performed    Due Date  --------------------------------------------------------------------------------    CMP.........  12 months..  atorvastatin, lisinopriL,   01- 01-                             mirtazapine..............    Lipid Panel.  12 months..  atorvastatin, mirtazapine  Not Found    Overdue    Powered by [employer]. Reference number: [id number].   3/05/2022 2:16:38 PM CST

## 2022-03-07 ENCOUNTER — HOSPITAL ENCOUNTER (EMERGENCY)
Facility: HOSPITAL | Age: 77
Discharge: HOME | End: 2022-03-07
Attending: EMERGENCY MEDICINE
Payer: MEDICARE

## 2022-03-07 VITALS
RESPIRATION RATE: 16 BRPM | BODY MASS INDEX: 44.38 KG/M2 | HEIGHT: 70 IN | SYSTOLIC BLOOD PRESSURE: 155 MMHG | HEART RATE: 78 BPM | TEMPERATURE: 97 F | OXYGEN SATURATION: 100 % | WEIGHT: 310 LBS | DIASTOLIC BLOOD PRESSURE: 72 MMHG
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
## 2022-03-07 DIAGNOSIS — M54.2 NECK PAIN: ICD-10-CM

## 2022-03-07 DIAGNOSIS — S09.90XA CLOSED HEAD INJURY, INITIAL ENCOUNTER: Primary | ICD-10-CM

## 2022-03-07 PROCEDURE — 99284 PR EMERGENCY DEPT VISIT,LEVEL IV: ICD-10-PCS | Mod: ,,, | Performed by: EMERGENCY MEDICINE

## 2022-03-07 PROCEDURE — 93010 EKG 12-LEAD: ICD-10-PCS | Mod: ,,, | Performed by: INTERNAL MEDICINE

## 2022-03-07 PROCEDURE — 99284 EMERGENCY DEPT VISIT MOD MDM: CPT | Mod: ,,, | Performed by: EMERGENCY MEDICINE

## 2022-03-07 PROCEDURE — 93010 ELECTROCARDIOGRAM REPORT: CPT | Mod: ,,, | Performed by: INTERNAL MEDICINE

## 2022-03-07 PROCEDURE — 99284 EMERGENCY DEPT VISIT MOD MDM: CPT | Mod: 25

## 2022-03-07 PROCEDURE — 25000003 PHARM REV CODE 250: Performed by: EMERGENCY MEDICINE

## 2022-03-07 PROCEDURE — 93005 ELECTROCARDIOGRAM TRACING: CPT

## 2022-03-07 RX ORDER — ACETAMINOPHEN 500 MG
1000 TABLET ORAL
Status: COMPLETED | OUTPATIENT
Start: 2022-03-07 | End: 2022-03-07

## 2022-03-07 RX ORDER — LIDOCAINE 50 MG/G
1 PATCH TOPICAL DAILY PRN
Qty: 15 PATCH | Refills: 0 | Status: SHIPPED | OUTPATIENT
Start: 2022-03-07

## 2022-03-07 RX ADMIN — ACETAMINOPHEN 1000 MG: 500 TABLET ORAL at 09:03

## 2022-03-08 NOTE — ED PROVIDER NOTES
Encounter Date: 3/7/2022       History     Chief Complaint   Patient presents with    Fall     Fell in bathtub tonight striking L posterior side of head, family denies LOC, no blood thinners     76-year-old male presenting after a fall.    PMH:  Alzheimer's dementia, depression, hearing loss, hyperlipidemia, hypertension, JUICE, restless legs syndrome    Context:  The patient was in the bathtub and slipped, falling and hitting the left side of his head.  Patient's wife is at bedside, did not witness the fall, but does not believe he lost consciousness.  She heard the fall and the patient called for help immediately after.  The patient denies loss of consciousness.  He is on aspirin, but no other anticoagulation.  He endorses left head and neck pain.  Onset:  Acute  Location:  Left-sided neck  Duration:  Just prior to arrival  Associated Symptoms:  Denies chest pain, back pain, hip pain    The history is provided by the patient and medical records. No  was used.     Review of patient's allergies indicates:  No Known Allergies  Past Medical History:   Diagnosis Date    Alzheimer's dementia     Depression     HEARING LOSS     Hyperlipidemia     Hypertension     JUICE (obstructive sleep apnea)     Personal history of colonic polyps     Prediabetes     Restless legs syndrome (RLS)     Sleep apnea      Past Surgical History:   Procedure Laterality Date    aaa stent      KNEE SURGERY      KNEE SURGERY      leg fx  repair     Family History   Problem Relation Age of Onset    Arthritis Mother     Hypertension Mother     Hearing loss Mother     Pneumonia Mother     Parkinsonism Father     Hypertension Brother     Colon cancer Brother     Hypertension Brother     Hypertension Brother      Social History     Tobacco Use    Smoking status: Current Every Day Smoker     Packs/day: 0.50     Years: 50.00     Pack years: 25.00    Smokeless tobacco: Never Used   Substance Use Topics     Alcohol use: No    Drug use: No     Review of Systems   Constitutional: Negative for fever.   HENT: Negative for facial swelling.    Respiratory: Negative for shortness of breath.    Cardiovascular: Negative for chest pain.   Gastrointestinal: Negative for abdominal pain.   Musculoskeletal: Positive for neck pain. Negative for back pain.   Allergic/Immunologic: Negative for immunocompromised state.   Neurological: Positive for headaches. Negative for syncope.   Hematological: Does not bruise/bleed easily.       Physical Exam     Initial Vitals [03/07/22 1958]   BP Pulse Resp Temp SpO2   (!) 148/69 81 18 98.4 °F (36.9 °C) 96 %      MAP       --         Physical Exam    Nursing note and vitals reviewed.  Constitutional: He is not diaphoretic. No distress.   HENT:   Head: Normocephalic. Head is without raccoon's eyes and without Alexander's sign.       Eyes: Right eye exhibits no discharge. Left eye exhibits no discharge.   Neck: Neck supple. No tracheal deviation present.   Left-sided cervical paraspinal tenderness   Cardiovascular: Normal rate, regular rhythm and intact distal pulses.   Pulmonary/Chest: Breath sounds normal. No respiratory distress. He exhibits no tenderness.   Abdominal: Abdomen is soft. There is no abdominal tenderness.   Musculoskeletal:      Cervical back: Neck supple.      Comments: No thoracic or lumbar spinal or paraspinal tenderness  No pelvic bony tenderness or instability  Very superficial contusion to left forearm, no bony tenderness  All extremity compartments soft     Neurological: He is alert. He has normal strength. No cranial nerve deficit or sensory deficit. GCS eye subscore is 4. GCS verbal subscore is 5. GCS motor subscore is 6.   Answers questions appropriately, at baseline mental status per wife   Skin: Skin is warm. No rash noted.   Psychiatric: He has a normal mood and affect. His behavior is normal.         ED Course   Procedures  Labs Reviewed - No data to display  EKG  Readings: (Independently Interpreted)   Initial Reading: No STEMI. Rhythm: Normal Sinus Rhythm. Heart Rate: 67. Clinical Impression: Normal Sinus Rhythm       Imaging Results          CT Head Without Contrast (Final result)  Result time 03/07/22 22:34:20    Final result by Reynaldo Correia MD (03/07/22 22:34:20)                 Impression:      No acute intracranial pathology.    No acute displaced fracture or traumatic malalignment in the cervical spine.    Multilevel cervical spondylosis, noting right greater than left facet arthropathy resulting in severe right neural foraminal narrowing at C3-C4 and C4-C5.    Electronically signed by resident: Connor Nassar  Date:    03/07/2022  Time:    21:48    Electronically signed by: Reynaldo Correia MD  Date:    03/07/2022  Time:    22:34             Narrative:    EXAMINATION:  CT HEAD WITHOUT CONTRAST; CT CERVICAL SPINE WITHOUT CONTRAST    CLINICAL HISTORY:  Head trauma, minor (Age >= 65y);; Neck pain, acute, no red flags;    TECHNIQUE:  Low dose axial CT images obtained throughout the head and cervical spine without intravenous contrast.  Axial, sagittal and coronal reconstructions were performed.    COMPARISON:  CT head 07/28/2021.    FINDINGS:  Head:    Generalized cerebral volume loss with compensatory enlargement of the ventricles, sulci, and cisterns.  No hydrocephalus.    The brain parenchyma appears stable.  Remote left inferior cerebellum infarct, unchanged.  No parenchymal mass, hemorrhage, edema or major vascular distribution infarct.    No extra-axial blood or fluid collections.    No calvarial fracture.  Mastoid air cells and paranasal sinuses are essentially clear.    Spine:    Generalized osteopenia limits evaluation for acute fracture.  There is mild inferior endplate deformities of the C5 and C6 vertebral bodies which appear chronic.  No acute displaced fracture.    Straightening of the normal cervical lordosis.  Multilevel anterior bridging osteophyte  formation.  No spondylolisthesis.    There are multiple anterior bridging osteophytes.  Multilevel degenerative changes of the cervical spine including multilevel posterior disc osteophyte complexes, uncovertebral joint spurring, and right greater than left facet arthropathy.  Findings results and multilevel neural foraminal narrowing most pronounced on the right, noting severe right neural foraminal narrowing at C3-C4 and C4-C5.  No severe spinal canal stenosis.    The paraspinal soft tissue structures exhibit no acute abnormalities.  Submandibular, parotid, and thyroid glands appear within normal limits.  No cervical lymphadenopathy.  Visualized upper lung apices appear clear.                               CT Cervical Spine Without Contrast (Final result)  Result time 03/07/22 22:34:20    Final result by Reynaldo Correia MD (03/07/22 22:34:20)                 Impression:      No acute intracranial pathology.    No acute displaced fracture or traumatic malalignment in the cervical spine.    Multilevel cervical spondylosis, noting right greater than left facet arthropathy resulting in severe right neural foraminal narrowing at C3-C4 and C4-C5.    Electronically signed by resident: Connor Nassar  Date:    03/07/2022  Time:    21:48    Electronically signed by: Reynlado Correia MD  Date:    03/07/2022  Time:    22:34             Narrative:    EXAMINATION:  CT HEAD WITHOUT CONTRAST; CT CERVICAL SPINE WITHOUT CONTRAST    CLINICAL HISTORY:  Head trauma, minor (Age >= 65y);; Neck pain, acute, no red flags;    TECHNIQUE:  Low dose axial CT images obtained throughout the head and cervical spine without intravenous contrast.  Axial, sagittal and coronal reconstructions were performed.    COMPARISON:  CT head 07/28/2021.    FINDINGS:  Head:    Generalized cerebral volume loss with compensatory enlargement of the ventricles, sulci, and cisterns.  No hydrocephalus.    The brain parenchyma appears stable.  Remote left inferior  cerebellum infarct, unchanged.  No parenchymal mass, hemorrhage, edema or major vascular distribution infarct.    No extra-axial blood or fluid collections.    No calvarial fracture.  Mastoid air cells and paranasal sinuses are essentially clear.    Spine:    Generalized osteopenia limits evaluation for acute fracture.  There is mild inferior endplate deformities of the C5 and C6 vertebral bodies which appear chronic.  No acute displaced fracture.    Straightening of the normal cervical lordosis.  Multilevel anterior bridging osteophyte formation.  No spondylolisthesis.    There are multiple anterior bridging osteophytes.  Multilevel degenerative changes of the cervical spine including multilevel posterior disc osteophyte complexes, uncovertebral joint spurring, and right greater than left facet arthropathy.  Findings results and multilevel neural foraminal narrowing most pronounced on the right, noting severe right neural foraminal narrowing at C3-C4 and C4-C5.  No severe spinal canal stenosis.    The paraspinal soft tissue structures exhibit no acute abnormalities.  Submandibular, parotid, and thyroid glands appear within normal limits.  No cervical lymphadenopathy.  Visualized upper lung apices appear clear.                                 Medications   acetaminophen tablet 1,000 mg (1,000 mg Oral Given 3/7/22 2146)     Medical Decision Making:   History:   Old Medical Records: I decided to obtain old medical records.  Initial Assessment:   76-year-old male presenting after mechanical slip and fall in the bathroom.  No LOC, no blood thinners.  ABCs intact, at mental status baseline.  No thoracoabdominal trauma.  Differential Diagnosis:   Including, but not limited to:  Mechanical fall  History not consistent with syncopal episode  Closed head injury  Intracranial hemorrhage  Cervical spine fracture  Independently Interpreted Test(s):   I have ordered and independently interpreted EKG Reading(s) - see prior  notes  Clinical Tests:   Radiological Study: Ordered and Reviewed  Medical Tests: Reviewed and Ordered  ED Management:  No acute traumatic finding on imaging.  Multilevel cervical spondylosis noted on CT.  Patient has a walker at home and his spouse will monitor closely.  Advised outpatient PCP follow-up for repeat blood pressure check this week.  All questions answered prior to discharge.  Return precautions given.  Patient and his partner understand and agree with the plan.                      Clinical Impression:   Final diagnoses:  [W19.XXXA] Fall  [S09.90XA] Closed head injury, initial encounter (Primary)  [M54.2] Neck pain          ED Disposition Condition    Discharge Stable        ED Prescriptions     Medication Sig Dispense Start Date End Date Auth. Provider    LIDOcaine (LIDODERM) 5 % Place 1 patch onto the skin daily as needed (neck pain). Remove & Discard patch within 12 hours or as directed by MD 15 patch 3/7/2022  Leodan Tolbert MD        Follow-up Information     Follow up With Specialties Details Why Contact Info    Artem Hood MD Internal Medicine In 1 week follow up today's visit, repeat blood pressure [address]  [place] [address]  [phone]      [facility] - Emergency Dept Emergency Medicine  As needed, If symptoms worsen [address] [facility] [id number]  [phone]           Leodan Tolbert MD  03/07/22 8015

## 2022-03-08 NOTE — ED NOTES
RN offers to assist patient into care; family refuses RN assistance into car. Pt d/c in no acute distress and no acute pain. No needs stated at time of discharge.

## 2022-03-12 NOTE — TELEPHONE ENCOUNTER
This Rx Request does not qualify for assessment with the [facility]   Please review protocol details and the Care Due Message for extra clinical information    Reasons Rx Request may be deferred:  1. Labs/Vitals overdue  2. Labs/Vitals abnormal  3. Patient has been seen in the ED/Hospital since the last PCP visit  4. Medication is non-delegated  5. Medication associated diagnosis code is outside of scope  6. Provider is a non-participating provider  7. Visit criteria not met (Patient needs to be seen at least every 15 months by PCP)    Note composed:2:39 PM 03/12/2022

## 2022-03-13 ENCOUNTER — HOSPITAL ENCOUNTER (EMERGENCY)
Facility: HOSPITAL | Age: 77
Discharge: HOME | End: 2022-03-13
Attending: EMERGENCY MEDICINE
Payer: MEDICARE

## 2022-03-13 VITALS
RESPIRATION RATE: 14 BRPM | HEART RATE: 70 BPM | OXYGEN SATURATION: 96 % | SYSTOLIC BLOOD PRESSURE: 142 MMHG | TEMPERATURE: 98 F | DIASTOLIC BLOOD PRESSURE: 81 MMHG

## 2022-03-13 DIAGNOSIS — R07.89 CHEST WALL PAIN: Primary | ICD-10-CM

## 2022-03-13 DIAGNOSIS — E11.9 TYPE 2 DIABETES MELLITUS WITHOUT COMPLICATION, UNSPECIFIED WHETHER LONG TERM INSULIN USE: ICD-10-CM

## 2022-03-13 DIAGNOSIS — R07.9 CHEST PAIN: ICD-10-CM

## 2022-03-13 LAB
ALBUMIN SERPL BCP-MCNC: 3.3 G/DL (ref 3.5–5.2)
ALP SERPL-CCNC: 102 U/L (ref 55–135)
ALT SERPL W/O P-5'-P-CCNC: 32 U/L (ref 10–44)
ANION GAP SERPL CALC-SCNC: 12 MMOL/L (ref 8–16)
AST SERPL-CCNC: 26 U/L (ref 10–40)
BASOPHILS # BLD AUTO: 0.05 K/UL (ref 0–0.2)
BASOPHILS NFR BLD: 0.8 % (ref 0–1.9)
BILIRUB SERPL-MCNC: 0.4 MG/DL (ref 0.1–1)
BNP SERPL-MCNC: 15 PG/ML (ref 0–99)
BUN SERPL-MCNC: 22 MG/DL (ref 8–23)
CALCIUM SERPL-MCNC: 8.8 MG/DL (ref 8.7–10.5)
CHLORIDE SERPL-SCNC: 110 MMOL/L (ref 95–110)
CO2 SERPL-SCNC: 20 MMOL/L (ref 23–29)
CREAT SERPL-MCNC: 1.1 MG/DL (ref 0.5–1.4)
DIFFERENTIAL METHOD: ABNORMAL
EOSINOPHIL # BLD AUTO: 0.2 K/UL (ref 0–0.5)
EOSINOPHIL NFR BLD: 3.9 % (ref 0–8)
ERYTHROCYTE [DISTWIDTH] IN BLOOD BY AUTOMATED COUNT: 16.1 % (ref 11.5–14.5)
EST. GFR  (AFRICAN AMERICAN): >60 ML/MIN/1.73 M^2
EST. GFR  (NON AFRICAN AMERICAN): >60 ML/MIN/1.73 M^2
GLUCOSE SERPL-MCNC: 136 MG/DL (ref 70–110)
HCT VFR BLD AUTO: 43.8 % (ref 40–54)
HGB BLD-MCNC: 13.8 G/DL (ref 14–18)
IMM GRANULOCYTES # BLD AUTO: 0.01 K/UL (ref 0–0.04)
IMM GRANULOCYTES NFR BLD AUTO: 0.2 % (ref 0–0.5)
LYMPHOCYTES # BLD AUTO: 2 K/UL (ref 1–4.8)
LYMPHOCYTES NFR BLD: 33.6 % (ref 18–48)
MCH RBC QN AUTO: 28.9 PG (ref 27–31)
MCHC RBC AUTO-ENTMCNC: 31.5 G/DL (ref 32–36)
MCV RBC AUTO: 92 FL (ref 82–98)
MONOCYTES # BLD AUTO: 0.5 K/UL (ref 0.3–1)
MONOCYTES NFR BLD: 9.1 % (ref 4–15)
NEUTROPHILS # BLD AUTO: 3.1 K/UL (ref 1.8–7.7)
NEUTROPHILS NFR BLD: 52.4 % (ref 38–73)
NRBC BLD-RTO: 0 /100 WBC
PLATELET # BLD AUTO: 192 K/UL (ref 150–450)
PMV BLD AUTO: 9.4 FL (ref 9.2–12.9)
POTASSIUM SERPL-SCNC: 4.6 MMOL/L (ref 3.5–5.1)
PROT SERPL-MCNC: 6.4 G/DL (ref 6–8.4)
RBC # BLD AUTO: 4.77 M/UL (ref 4.6–6.2)
SODIUM SERPL-SCNC: 142 MMOL/L (ref 136–145)
TROPONIN I SERPL DL<=0.01 NG/ML-MCNC: 0.01 NG/ML (ref 0–0.03)
WBC # BLD AUTO: 5.92 K/UL (ref 3.9–12.7)

## 2022-03-13 PROCEDURE — 80053 COMPREHEN METABOLIC PANEL: CPT

## 2022-03-13 PROCEDURE — 93010 ELECTROCARDIOGRAM REPORT: CPT | Mod: ,,, | Performed by: INTERNAL MEDICINE

## 2022-03-13 PROCEDURE — 36000 PLACE NEEDLE IN VEIN: CPT

## 2022-03-13 PROCEDURE — 83880 ASSAY OF NATRIURETIC PEPTIDE: CPT

## 2022-03-13 PROCEDURE — 84484 ASSAY OF TROPONIN QUANT: CPT

## 2022-03-13 PROCEDURE — 85025 COMPLETE CBC W/AUTO DIFF WBC: CPT

## 2022-03-13 PROCEDURE — 99284 EMERGENCY DEPT VISIT MOD MDM: CPT | Mod: ,,, | Performed by: EMERGENCY MEDICINE

## 2022-03-13 PROCEDURE — 99285 EMERGENCY DEPT VISIT HI MDM: CPT | Mod: 25

## 2022-03-13 PROCEDURE — 93005 ELECTROCARDIOGRAM TRACING: CPT

## 2022-03-13 PROCEDURE — 93010 EKG 12-LEAD: ICD-10-PCS | Mod: ,,, | Performed by: INTERNAL MEDICINE

## 2022-03-13 PROCEDURE — 99284 PR EMERGENCY DEPT VISIT,LEVEL IV: ICD-10-PCS | Mod: ,,, | Performed by: EMERGENCY MEDICINE

## 2022-03-13 RX ORDER — TAMSULOSIN HYDROCHLORIDE 0.4 MG/1
CAPSULE ORAL
Qty: 30 CAPSULE | Refills: 11 | Status: SHIPPED | OUTPATIENT
Start: 2022-03-13

## 2022-03-13 RX ORDER — HYDROCODONE BITARTRATE AND ACETAMINOPHEN 5; 325 MG/1; MG/1
1 TABLET ORAL EVERY 6 HOURS PRN
Qty: 12 TABLET | Refills: 0 | Status: ON HOLD | OUTPATIENT
Start: 2022-03-13 | End: 2022-06-15

## 2022-03-13 NOTE — ED PROVIDER NOTES
Encounter Date: 3/13/2022       History     Chief Complaint   Patient presents with    Chest Pain     Chest pain woke him up, hx dementia, hard of hearing. Denies pain at this time     76-year-old male with past medical history of hypertension, AAA, chronic bilateral lower extremity cellulitis (followed by Dr. Hood [facility])Alzheimer's, hyperlipidemia, 25 pack-year history and obstructive sleep apnea presents to the ED via EMS complaining left-sided chest pain that onset at 9:30 AM.  Per EMS report patient was hypertensive, however, overall in no acute distress when they picked him up.  He received 324 mg aspirin and an unremarkable EKG en route. In the emergency department for the history was primarily obtained from the spouse as patient is extremely hard of hearing.  She reports that the patient woke up from sleep complaining of left-sided sharp chest pain.  He has never had a pain similar to this and does not have a history of an MI.  Patient denies nausea, vomiting, diarrhea, shortness of breath, abdominal pain, headache, fever and chills.  No other complaints at this time.    The history is provided by the patient, the EMS personnel and the spouse.     Review of patient's allergies indicates:  No Known Allergies  Past Medical History:   Diagnosis Date    Alzheimer's dementia     Depression     HEARING LOSS     Hyperlipidemia     Hypertension     JUICE (obstructive sleep apnea)     Personal history of colonic polyps     Prediabetes     Restless legs syndrome (RLS)     Sleep apnea      Past Surgical History:   Procedure Laterality Date    aaa stent      KNEE SURGERY      KNEE SURGERY      leg fx  repair     Family History   Problem Relation Age of Onset    Arthritis Mother     Hypertension Mother     Hearing loss Mother     Pneumonia Mother     Parkinsonism Father     Hypertension Brother     Colon cancer Brother     Hypertension Brother     Hypertension Brother      Social  History     Tobacco Use    Smoking status: Current Every Day Smoker     Packs/day: 0.50     Years: 50.00     Pack years: 25.00    Smokeless tobacco: Never Used   Substance Use Topics    Alcohol use: No    Drug use: No     Review of Systems   Constitutional: Negative for activity change, chills and fever.   HENT: Negative for congestion, ear pain and sore throat.    Respiratory: Negative for shortness of breath and stridor.    Cardiovascular: Positive for chest pain. Negative for palpitations.   Gastrointestinal: Negative for abdominal pain, nausea and vomiting.   Genitourinary: Negative for dysuria.   Musculoskeletal: Negative for back pain.   Skin: Negative for rash.   Neurological: Negative for dizziness, syncope, weakness and headaches.   Hematological: Does not bruise/bleed easily.       Physical Exam     Initial Vitals [03/13/22 1136]   BP Pulse Resp Temp SpO2   (!) 172/89 68 18 98.3 °F (36.8 °C) 97 %      MAP       --         Physical Exam    Nursing note and vitals reviewed.  Constitutional: Vital signs are normal. He appears well-developed and well-nourished. He is not diaphoretic. No distress.   HENT:   Head: Normocephalic and atraumatic.   Right Ear: External ear normal.   Left Ear: External ear normal.   Neck: Trachea normal. Neck supple. No thyroid mass present.   Cardiovascular: Normal rate, regular rhythm and intact distal pulses. Exam reveals no gallop and no friction rub.    Murmur heard.  Holosystolic murmur present.   Pulmonary/Chest: No respiratory distress. He has wheezes. He has no rhonchi. He has no rales.   Abdominal: Abdomen is soft. Bowel sounds are normal. He exhibits no distension. There is no abdominal tenderness. There is no rebound and no guarding.   Musculoskeletal:      Cervical back: Neck supple.     Neurological: He is alert and oriented to person, place, and time. He has normal strength. No cranial nerve deficit or sensory deficit. GCS score is 15. GCS eye subscore is 4. GCS  verbal subscore is 5. GCS motor subscore is 6.   Skin: Skin is warm and dry. Capillary refill takes less than 2 seconds. No rash noted.   Cellulitis of the BLE.   Psychiatric: He has a normal mood and affect.         ED Course   Procedures  Labs Reviewed   CBC W/ AUTO DIFFERENTIAL - Abnormal; Notable for the following components:       Result Value    Hemoglobin 13.8 (*)     MCHC 31.5 (*)     RDW 16.1 (*)     All other components within normal limits   COMPREHENSIVE METABOLIC PANEL - Abnormal; Notable for the following components:    CO2 20 (*)     Glucose 136 (*)     Albumin 3.3 (*)     All other components within normal limits   TROPONIN I   B-TYPE NATRIURETIC PEPTIDE     EKG Readings: (Independently Interpreted)   Initial Reading: No STEMI. Rhythm: Normal Sinus Rhythm. Heart Rate: 69. Ectopy: No Ectopy. Conduction: Normal. ST Segments: Normal ST Segments. T Waves: Normal. Axis: Normal.       Imaging Results          X-Ray Chest AP Portable (Final result)  Result time 03/13/22 12:31:19    Final result by Payam Gilliam MD (03/13/22 12:31:19)                 Impression:      Grossly stable chronic findings as above without radiographic acute intrathoracic process seen on this single view.      Electronically signed by: Payam Gilliam MD  Date:    03/13/2022  Time:    12:31             Narrative:    EXAMINATION:  XR CHEST AP PORTABLE    CLINICAL HISTORY:  Chest Pain;    TECHNIQUE:  Single frontal view of the chest was performed.    COMPARISON:  Chest radiograph 01/16/2021    FINDINGS:  Monitoring leads overlie the chest.  Patient is slightly rotated.    Inferior-most aspect of the bilateral costophrenic angles not included in field of view.  Cardiomediastinal silhouette is midline and prominent with calcification and tortuosity of the aorta and mildly enlarged cardiac silhouette similar to prior.  Calcified left mediastinal lymph nodes unchanged.  Bibasilar linear opacities consistent with platelike scarring versus  atelectasis.  Small to moderate-sized hiatal hernia projected over the medial left lung base again noted.  Chronic nonspecific elevation of the right hemidiaphragm.  The lungs are otherwise well expanded without large consolidation, pleural effusion or pneumothorax definitively seen.  Hilar contours are unchanged.  No acute osseous process seen.  PA and lateral views can be obtained.                                 Medications - No data to display  Medical Decision Making:   Initial Assessment:   76-year-old male who is in no acute distress presents the ED via EMS for evaluation of chest pain.  Patient is able to converse normally, breath sounds are equal bilaterally and distal pulses are present.  Physical exam reveals bilateral lower extremity edema and cellulitis + holosystolic murmur upon auscultation.  Differential Diagnosis:   ACS  Aortic dissection  Pneumonia  Pneumothorax  Costochondritis  ED Management:  Upon reassessment wife in-patient states that patient had a fall a few days ago which could lead to the chest pain that is in during right now. BMP, initial troponin, CBC and CMP are unremarkable.  Chest x-ray was unremarkable for any new changes.  Gave proper instructions on possible need for a walker if patient has repeated episodes of falls.  Will discharge patient with 12 tabs of Q6h Vicodin for pain.  Patient agrees with the plan and I will discharge the patient.             ED Course as of 03/13/22 1321   Sun Mar 13, 2022   1240 Troponin I: 0.008 [BG]      ED Course User Index  [BG] Varghese Guo MD             Clinical Impression:   Final diagnoses:  [R07.9] Chest pain  [R07.89] Chest wall pain (Primary)          ED Disposition Condition    Discharge Stable        ED Prescriptions     Medication Sig Dispense Start Date End Date Auth. Provider    HYDROcodone-acetaminophen (NORCO) 5-325 mg per tablet Take 1 tablet by mouth every 6 (six) hours as needed for Pain. 12 tablet 3/13/2022  Varghese  MD Brant        Follow-up Information     Follow up With Specialties Details Why Contact Info    Artem Hood MD Internal Medicine Schedule an appointment as soon as possible for a visit in 1 week Please schedule follow-up appointment with your primary care physician in order to discuss your most recent ED visit and the chronic cellulitis. [address]  [facility] [address]  [phone]      [facility] Dept Emergency Medicine Go to  Please return to the emergency department if you develop any new or worsening symptoms. [address]  [facility] [id number] [phone]           Varghese Guo MD  Resident  03/13/22 1321

## 2022-03-13 NOTE — EKG INTERPRETATIONS - EMERGENCY DEPT.
Independently interpreted by MD:  Rate 69, NSR, no stemi, no ectopy, no hypertrophy, low voltage, nonspecific T wave changes

## 2022-03-13 NOTE — ED TRIAGE NOTES
Jake Louie, a 76 y.o. male presents to the ED via EMS from home w/ complaint of chest pain that woke him up this morning; given ASA 324mg PTA    Triage note:  Chief Complaint   Patient presents with    Chest Pain     Chest pain woke him up, hx dementia, hard of hearing. Denies pain at this time     Review of patient's allergies indicates:  No Known Allergies  Past Medical History:   Diagnosis Date    Alzheimer's dementia     Depression     HEARING LOSS     Hyperlipidemia     Hypertension     JUICE (obstructive sleep apnea)     Personal history of colonic polyps     Prediabetes     Restless legs syndrome (RLS)     Sleep apnea

## 2022-03-14 NOTE — PROGRESS NOTES
MEDICAL HISTORY:  Hypertension.  Hyperlipidemia.  Type 2 diabetes.  Complex sleep apnea with the use of CPAP  Dementia  Chronic lower extremity edema with venous insufficiency.  Morbid obesity.  Restless leg syndrome.  Right knee chondroplasty  Adenomatous colon polyp in 2002.  AAA, Endovascular stetnt     SOCIAL HISTORY:  Tobacco use, none.  Alcohol use, none.     MEDICATIONS:  Lisinopril 20 mg b.i.d.  Lasix 20 mg b.i.d.  Atorvastatin 10 mg   Vitamin D.  Rixmots49 mg b.i.d  Lamictal 100 mg b.i.d.  Seroquel 25 mg q.h.s.  Prozac 20 mg b.i.d.  Seroquel 100 mg twice a day in 200 mg at night  Remeron 30 mg at night  Amlodipine 5 mg  Aricept 10 mg  Melatonin 5 mg at bedtime  Tamsulosin 0.4 mg    76-year-old male  Follow-up visit      ER visit March 7th.  Where he had a fall in his bathtub.  He was getting off the toilet.  Lost his footing.  Landed on the left side of the tolerated hit his head.  Went to the emergency room CT scan was unrevealing other than degenerative changes cervical spine.  He was given Tylenol at the time and then for the week his wife's been given Tylenol as needed.  But during the week that follow he complain of constant spastic pain around the lateral aspect of both chest whenever he would move.    One Sunday March 13th he woke up complaining of bad pains on the left side of his chest.  EMS came out brought to the emergency room.  Evaluation for acute coronary syndrome was unrevealing.  It was felt to be musculoskeletal.  He was prescribed 12 pills of hydrocodone.  Since then wife is given about 5 which is been helpful.  The degree of pain is much  Improved.    During this time no shortness of breath or abdominal pain.  It appears that he is having regular bowel urine function    What is being noted is worsen edema involving the lower extremity for venous insufficiency in the left leg is become red again.  He has been treated for cellulitis before particularly with clindamycin    Examination  weight 308 lb  Pulse 84  Blood pressure 120/72  Chest clear breath sounds  Heart regular rate rhythm  Abdominal exam is bowel sounds soft nontender  Extremities 1 to 2+ edema right leg, 2+ 3+ edema left leg.  Significant erythema over the anterior aspect of the left leg with vesicles noted    Impression  Hypertension  Hyperlipidemia  Type 2 diabetes used to be on metformin but it was stopped because of cramps and diarrhea  Complex sleep apnea but is noted he is not using CPAP  Dementia with major cognitive impairment  Chronic venous insufficiency with venous stasis  Cellulitis    Plan  Restart clindamycin on 50 mg 3 times a day  Recommend put a hold on amlodipine  Patient come back in repeat basic metabolic profile, hemoglobin A1c, TSH, lipid profile at that time discussed with any he has further management regarding diuretics as well as a follow-up on this history of aortic abdominal aneurysm

## 2022-03-15 ENCOUNTER — OFFICE VISIT (OUTPATIENT)
Dept: INTERNAL MEDICINE | Facility: CLINIC | Age: 77
End: 2022-03-15
Payer: MEDICARE

## 2022-03-15 VITALS
OXYGEN SATURATION: 98 % | HEART RATE: 90 BPM | WEIGHT: 308.63 LBS | SYSTOLIC BLOOD PRESSURE: 134 MMHG | BODY MASS INDEX: 44.18 KG/M2 | HEIGHT: 70 IN | DIASTOLIC BLOOD PRESSURE: 80 MMHG

## 2022-03-15 DIAGNOSIS — G30.9 ALZHEIMER'S DEMENTIA WITHOUT BEHAVIORAL DISTURBANCE, UNSPECIFIED TIMING OF DEMENTIA ONSET: ICD-10-CM

## 2022-03-15 DIAGNOSIS — E11.9 TYPE 2 DIABETES MELLITUS WITHOUT COMPLICATION, WITHOUT LONG-TERM CURRENT USE OF INSULIN: ICD-10-CM

## 2022-03-15 DIAGNOSIS — I10 ESSENTIAL HYPERTENSION: Primary | ICD-10-CM

## 2022-03-15 DIAGNOSIS — F02.80 ALZHEIMER'S DEMENTIA WITHOUT BEHAVIORAL DISTURBANCE, UNSPECIFIED TIMING OF DEMENTIA ONSET: ICD-10-CM

## 2022-03-15 DIAGNOSIS — I87.2 VENOUS STASIS DERMATITIS OF LEFT LOWER EXTREMITY: ICD-10-CM

## 2022-03-15 DIAGNOSIS — L03.115 BILATERAL LOWER LEG CELLULITIS: ICD-10-CM

## 2022-03-15 DIAGNOSIS — L03.116 BILATERAL LOWER LEG CELLULITIS: ICD-10-CM

## 2022-03-15 DIAGNOSIS — F03.91 DEMENTIA WITH BEHAVIORAL DISTURBANCE, UNSPECIFIED DEMENTIA TYPE: ICD-10-CM

## 2022-03-15 DIAGNOSIS — I87.2 CHRONIC VENOUS INSUFFICIENCY OF LOWER EXTREMITY: ICD-10-CM

## 2022-03-15 PROCEDURE — 1101F PR PT FALLS ASSESS DOC 0-1 FALLS W/OUT INJ PAST YR: ICD-10-PCS | Mod: CPTII,S$GLB,, | Performed by: INTERNAL MEDICINE

## 2022-03-15 PROCEDURE — 3288F FALL RISK ASSESSMENT DOCD: CPT | Mod: CPTII,S$GLB,, | Performed by: INTERNAL MEDICINE

## 2022-03-15 PROCEDURE — 1159F MED LIST DOCD IN RCRD: CPT | Mod: CPTII,S$GLB,, | Performed by: INTERNAL MEDICINE

## 2022-03-15 PROCEDURE — 3075F PR MOST RECENT SYSTOLIC BLOOD PRESS GE 130-139MM HG: ICD-10-PCS | Mod: CPTII,S$GLB,, | Performed by: INTERNAL MEDICINE

## 2022-03-15 PROCEDURE — 1125F AMNT PAIN NOTED PAIN PRSNT: CPT | Mod: CPTII,S$GLB,, | Performed by: INTERNAL MEDICINE

## 2022-03-15 PROCEDURE — 1160F RVW MEDS BY RX/DR IN RCRD: CPT | Mod: CPTII,S$GLB,, | Performed by: INTERNAL MEDICINE

## 2022-03-15 PROCEDURE — 99999 PR PBB SHADOW E&M-EST. PATIENT-LVL IV: ICD-10-PCS | Mod: PBBFAC,,, | Performed by: INTERNAL MEDICINE

## 2022-03-15 PROCEDURE — 99214 PR OFFICE/OUTPT VISIT, EST, LEVL IV, 30-39 MIN: ICD-10-PCS | Mod: S$GLB,,, | Performed by: INTERNAL MEDICINE

## 2022-03-15 PROCEDURE — 1125F PR PAIN SEVERITY QUANTIFIED, PAIN PRESENT: ICD-10-PCS | Mod: CPTII,S$GLB,, | Performed by: INTERNAL MEDICINE

## 2022-03-15 PROCEDURE — 3079F PR MOST RECENT DIASTOLIC BLOOD PRESSURE 80-89 MM HG: ICD-10-PCS | Mod: CPTII,S$GLB,, | Performed by: INTERNAL MEDICINE

## 2022-03-15 PROCEDURE — 99999 PR PBB SHADOW E&M-EST. PATIENT-LVL IV: CPT | Mod: PBBFAC,,, | Performed by: INTERNAL MEDICINE

## 2022-03-15 PROCEDURE — 1101F PT FALLS ASSESS-DOCD LE1/YR: CPT | Mod: CPTII,S$GLB,, | Performed by: INTERNAL MEDICINE

## 2022-03-15 PROCEDURE — 99214 OFFICE O/P EST MOD 30 MIN: CPT | Mod: S$GLB,,, | Performed by: INTERNAL MEDICINE

## 2022-03-15 PROCEDURE — 1160F PR REVIEW ALL MEDS BY PRESCRIBER/CLIN PHARMACIST DOCUMENTED: ICD-10-PCS | Mod: CPTII,S$GLB,, | Performed by: INTERNAL MEDICINE

## 2022-03-15 PROCEDURE — 3075F SYST BP GE 130 - 139MM HG: CPT | Mod: CPTII,S$GLB,, | Performed by: INTERNAL MEDICINE

## 2022-03-15 PROCEDURE — 3288F PR FALLS RISK ASSESSMENT DOCUMENTED: ICD-10-PCS | Mod: CPTII,S$GLB,, | Performed by: INTERNAL MEDICINE

## 2022-03-15 PROCEDURE — 3079F DIAST BP 80-89 MM HG: CPT | Mod: CPTII,S$GLB,, | Performed by: INTERNAL MEDICINE

## 2022-03-15 PROCEDURE — 1159F PR MEDICATION LIST DOCUMENTED IN MEDICAL RECORD: ICD-10-PCS | Mod: CPTII,S$GLB,, | Performed by: INTERNAL MEDICINE

## 2022-03-15 RX ORDER — CLINDAMYCIN HYDROCHLORIDE 150 MG/1
150 CAPSULE ORAL 3 TIMES DAILY
Qty: 30 CAPSULE | Refills: 0 | Status: SHIPPED | OUTPATIENT
Start: 2022-03-15 | End: 2022-03-25

## 2022-03-17 ENCOUNTER — LAB VISIT (OUTPATIENT)
Dept: LAB | Facility: HOSPITAL | Age: 77
End: 2022-03-17
Attending: INTERNAL MEDICINE
Payer: MEDICARE

## 2022-03-17 ENCOUNTER — IMMUNIZATION (OUTPATIENT)
Dept: PHARMACY | Facility: CLINIC | Age: 77
End: 2022-03-17
Payer: MEDICARE

## 2022-03-17 DIAGNOSIS — E11.9 TYPE 2 DIABETES MELLITUS WITHOUT COMPLICATION, WITHOUT LONG-TERM CURRENT USE OF INSULIN: ICD-10-CM

## 2022-03-17 DIAGNOSIS — L03.115 BILATERAL LOWER LEG CELLULITIS: ICD-10-CM

## 2022-03-17 DIAGNOSIS — Z23 NEED FOR VACCINATION: Primary | ICD-10-CM

## 2022-03-17 DIAGNOSIS — L03.116 BILATERAL LOWER LEG CELLULITIS: ICD-10-CM

## 2022-03-17 DIAGNOSIS — F03.91 DEMENTIA WITH BEHAVIORAL DISTURBANCE, UNSPECIFIED DEMENTIA TYPE: ICD-10-CM

## 2022-03-17 DIAGNOSIS — F02.80 ALZHEIMER'S DEMENTIA WITHOUT BEHAVIORAL DISTURBANCE, UNSPECIFIED TIMING OF DEMENTIA ONSET: ICD-10-CM

## 2022-03-17 DIAGNOSIS — G30.9 ALZHEIMER'S DEMENTIA WITHOUT BEHAVIORAL DISTURBANCE, UNSPECIFIED TIMING OF DEMENTIA ONSET: ICD-10-CM

## 2022-03-17 DIAGNOSIS — I87.2 CHRONIC VENOUS INSUFFICIENCY OF LOWER EXTREMITY: ICD-10-CM

## 2022-03-17 DIAGNOSIS — I10 ESSENTIAL HYPERTENSION: ICD-10-CM

## 2022-03-17 DIAGNOSIS — I87.2 VENOUS STASIS DERMATITIS OF LEFT LOWER EXTREMITY: ICD-10-CM

## 2022-03-17 LAB
ANION GAP SERPL CALC-SCNC: 10 MMOL/L (ref 8–16)
BUN SERPL-MCNC: 16 MG/DL (ref 8–23)
CALCIUM SERPL-MCNC: 8.8 MG/DL (ref 8.7–10.5)
CHLORIDE SERPL-SCNC: 108 MMOL/L (ref 95–110)
CHOLEST SERPL-MCNC: 149 MG/DL (ref 120–199)
CHOLEST/HDLC SERPL: 4.5 {RATIO} (ref 2–5)
CO2 SERPL-SCNC: 25 MMOL/L (ref 23–29)
CREAT SERPL-MCNC: 1.1 MG/DL (ref 0.5–1.4)
EST. GFR  (AFRICAN AMERICAN): >60 ML/MIN/1.73 M^2
EST. GFR  (NON AFRICAN AMERICAN): >60 ML/MIN/1.73 M^2
ESTIMATED AVG GLUCOSE: 134 MG/DL (ref 68–131)
GLUCOSE SERPL-MCNC: 129 MG/DL (ref 70–110)
HBA1C MFR BLD: 6.3 % (ref 4–5.6)
HDLC SERPL-MCNC: 33 MG/DL (ref 40–75)
HDLC SERPL: 22.1 % (ref 20–50)
LDLC SERPL CALC-MCNC: 88 MG/DL (ref 63–159)
NONHDLC SERPL-MCNC: 116 MG/DL
POTASSIUM SERPL-SCNC: 3.9 MMOL/L (ref 3.5–5.1)
SODIUM SERPL-SCNC: 143 MMOL/L (ref 136–145)
TRIGL SERPL-MCNC: 140 MG/DL (ref 30–150)
TSH SERPL DL<=0.005 MIU/L-ACNC: 2.19 UIU/ML (ref 0.4–4)

## 2022-03-17 PROCEDURE — 80061 LIPID PANEL: CPT | Performed by: INTERNAL MEDICINE

## 2022-03-17 PROCEDURE — 83036 HEMOGLOBIN GLYCOSYLATED A1C: CPT | Performed by: INTERNAL MEDICINE

## 2022-03-17 PROCEDURE — 84443 ASSAY THYROID STIM HORMONE: CPT | Performed by: INTERNAL MEDICINE

## 2022-03-17 PROCEDURE — 36415 COLL VENOUS BLD VENIPUNCTURE: CPT | Performed by: INTERNAL MEDICINE

## 2022-03-17 PROCEDURE — 80048 BASIC METABOLIC PNL TOTAL CA: CPT | Performed by: INTERNAL MEDICINE

## 2022-03-18 ENCOUNTER — PATIENT MESSAGE (OUTPATIENT)
Dept: INTERNAL MEDICINE | Facility: CLINIC | Age: 77
End: 2022-03-18
Payer: MEDICARE

## 2022-03-18 DIAGNOSIS — F02.80 ALZHEIMER'S DEMENTIA WITHOUT BEHAVIORAL DISTURBANCE, UNSPECIFIED TIMING OF DEMENTIA ONSET: ICD-10-CM

## 2022-03-18 DIAGNOSIS — G30.9 ALZHEIMER'S DEMENTIA WITHOUT BEHAVIORAL DISTURBANCE, UNSPECIFIED TIMING OF DEMENTIA ONSET: ICD-10-CM

## 2022-03-19 RX ORDER — MEMANTINE HYDROCHLORIDE 10 MG/1
10 TABLET ORAL 2 TIMES DAILY
Qty: 180 TABLET | Refills: 1 | Status: SHIPPED | OUTPATIENT
Start: 2022-03-19

## 2022-03-19 RX ORDER — LAMOTRIGINE 100 MG/1
100 TABLET ORAL 2 TIMES DAILY
Qty: 180 TABLET | Refills: 1 | Status: SHIPPED | OUTPATIENT
Start: 2022-03-19 | End: 2022-09-18 | Stop reason: SDUPTHER

## 2022-03-30 ENCOUNTER — PATIENT MESSAGE (OUTPATIENT)
Dept: INTERNAL MEDICINE | Facility: CLINIC | Age: 77
End: 2022-03-30
Payer: MEDICARE

## 2022-03-30 RX ORDER — LISINOPRIL 20 MG/1
20 TABLET ORAL 2 TIMES DAILY
Qty: 180 TABLET | Refills: 3 | Status: SHIPPED | OUTPATIENT
Start: 2022-03-30 | End: 2023-03-30

## 2022-03-30 RX ORDER — AMOXICILLIN AND CLAVULANATE POTASSIUM 875; 125 MG/1; MG/1
1 TABLET, FILM COATED ORAL 2 TIMES DAILY
Qty: 20 TABLET | Refills: 0 | Status: SHIPPED | OUTPATIENT
Start: 2022-03-30 | End: 2022-05-04 | Stop reason: SDUPTHER

## 2022-03-30 RX ORDER — MUPIROCIN 20 MG/G
OINTMENT TOPICAL 2 TIMES DAILY
Qty: 30 G | Refills: 1 | Status: SHIPPED | OUTPATIENT
Start: 2022-03-30

## 2022-03-30 RX ORDER — METOLAZONE 5 MG/1
5 TABLET ORAL DAILY
Qty: 10 TABLET | Refills: 0 | Status: SHIPPED | OUTPATIENT
Start: 2022-03-30 | End: 2022-05-04 | Stop reason: SDUPTHER

## 2022-05-04 ENCOUNTER — PATIENT MESSAGE (OUTPATIENT)
Dept: INTERNAL MEDICINE | Facility: CLINIC | Age: 77
End: 2022-05-04
Payer: MEDICARE

## 2022-05-04 RX ORDER — METOLAZONE 5 MG/1
5 TABLET ORAL DAILY
Qty: 10 TABLET | Refills: 0 | Status: ON HOLD | OUTPATIENT
Start: 2022-05-04 | End: 2022-06-15

## 2022-05-04 RX ORDER — AMOXICILLIN AND CLAVULANATE POTASSIUM 875; 125 MG/1; MG/1
1 TABLET, FILM COATED ORAL 2 TIMES DAILY
Qty: 20 TABLET | Refills: 0 | Status: SHIPPED | OUTPATIENT
Start: 2022-05-04 | End: 2022-05-14

## 2022-05-16 NOTE — TELEPHONE ENCOUNTER
No new care gaps identified.  [facility] Embedded Care Gaps. Reference number: [id number]. 5/16/2022   2:37:27 AM CDT

## 2022-05-17 ENCOUNTER — PATIENT MESSAGE (OUTPATIENT)
Dept: ORTHOPEDICS | Facility: CLINIC | Age: 77
End: 2022-05-17
Payer: MEDICARE

## 2022-05-17 RX ORDER — FUROSEMIDE 20 MG/1
TABLET ORAL
Qty: 90 TABLET | Refills: 3 | Status: ON HOLD | OUTPATIENT
Start: 2022-05-17 | End: 2022-06-15

## 2022-05-17 NOTE — TELEPHONE ENCOUNTER
Refill Routing Note   Medication(s) are not appropriate for processing by [facility] for the following reason(s):      - Drug-Drug Interaction (furosemide and metolazone)    [facility] action(s):  Defer Medication-related problems identified: Drug-drug interaction     Medication Therapy Plan: Drug drug interaction: furosemide and metOLazone; Defer to PCP  Medication reconciliation completed: No     Appointments  past 12m or future 3m with PCP    Date Provider   Last Visit   3/15/2022 Artem Hood MD   Next Visit   Visit date not found Artem Hood MD   ED visits in past 90 days: 2        Note composed:8:52 AM 05/17/2022

## 2022-05-18 ENCOUNTER — PATIENT OUTREACH (OUTPATIENT)
Dept: ADMINISTRATIVE | Facility: OTHER | Age: 77
End: 2022-05-18
Payer: MEDICARE

## 2022-05-19 NOTE — PROGRESS NOTES
Requested updates within Care Everywhere.  Patient's chart was reviewed for overdue HUMBERTO topics.  Health maintenance:updated  Immunizations:reconciled   Legacy:   Media:revieed for outside colonoscopy and eye exam  Orders placed:  Tasked appts:  Labs Linked:  Upcoming appt:

## 2022-05-20 ENCOUNTER — PATIENT MESSAGE (OUTPATIENT)
Dept: ORTHOPEDICS | Facility: CLINIC | Age: 77
End: 2022-05-20
Payer: MEDICARE

## 2022-05-30 ENCOUNTER — PATIENT MESSAGE (OUTPATIENT)
Dept: ADMINISTRATIVE | Facility: HOSPITAL | Age: 77
End: 2022-05-30
Payer: MEDICARE

## 2022-06-09 ENCOUNTER — PATIENT MESSAGE (OUTPATIENT)
Dept: ORTHOPEDICS | Facility: CLINIC | Age: 77
End: 2022-06-09
Payer: MEDICARE

## 2022-06-10 NOTE — TELEPHONE ENCOUNTER
Fax request received from pharmacy for QUEtiapine (SEROQUEL) 100 MG Tab patient also has a 200mg once daily in chart. Attempted to contact patient to confirm dosing and refill needs. No answer LVM to return call.

## 2022-06-14 ENCOUNTER — HOSPITAL ENCOUNTER (INPATIENT)
Facility: HOSPITAL | Age: 77
LOS: 3 days | Discharge: HOME HEALTH | DRG: 057 | End: 2022-06-20
Attending: EMERGENCY MEDICINE | Admitting: INTERNAL MEDICINE
Payer: MEDICARE

## 2022-06-14 DIAGNOSIS — I71.40 ABDOMINAL AORTIC ANEURYSM (AAA) WITHOUT RUPTURE: ICD-10-CM

## 2022-06-14 DIAGNOSIS — G30.1 LATE ONSET ALZHEIMER'S DEMENTIA WITH BEHAVIORAL DISTURBANCE: ICD-10-CM

## 2022-06-14 DIAGNOSIS — R73.03 PREDIABETES: ICD-10-CM

## 2022-06-14 DIAGNOSIS — E78.5 HYPERLIPIDEMIA, UNSPECIFIED HYPERLIPIDEMIA TYPE: ICD-10-CM

## 2022-06-14 DIAGNOSIS — R60.9 SWELLING: Primary | ICD-10-CM

## 2022-06-14 DIAGNOSIS — R07.9 CHEST PAIN: ICD-10-CM

## 2022-06-14 DIAGNOSIS — F32.A DEPRESSION, UNSPECIFIED DEPRESSION TYPE: ICD-10-CM

## 2022-06-14 DIAGNOSIS — N39.0 URINARY TRACT INFECTION WITHOUT HEMATURIA, SITE UNSPECIFIED: ICD-10-CM

## 2022-06-14 DIAGNOSIS — M79.605 PAIN OF LEFT LOWER EXTREMITY: ICD-10-CM

## 2022-06-14 DIAGNOSIS — F02.818 LATE ONSET ALZHEIMER'S DISEASE WITH BEHAVIORAL DISTURBANCE: ICD-10-CM

## 2022-06-14 DIAGNOSIS — G47.33 OSA (OBSTRUCTIVE SLEEP APNEA): ICD-10-CM

## 2022-06-14 DIAGNOSIS — Z91.89 AT RISK FOR LONG QT SYNDROME: ICD-10-CM

## 2022-06-14 DIAGNOSIS — S80.811A ABRASION OF ANTERIOR RIGHT LOWER LEG, INITIAL ENCOUNTER: ICD-10-CM

## 2022-06-14 DIAGNOSIS — F02.818 LATE ONSET ALZHEIMER'S DEMENTIA WITH BEHAVIORAL DISTURBANCE: ICD-10-CM

## 2022-06-14 DIAGNOSIS — R41.82 ALTERED MENTAL STATUS: ICD-10-CM

## 2022-06-14 DIAGNOSIS — G30.1 LATE ONSET ALZHEIMER'S DISEASE WITH BEHAVIORAL DISTURBANCE: ICD-10-CM

## 2022-06-14 DIAGNOSIS — I87.302 STASIS EDEMA OF LEFT LOWER EXTREMITY: ICD-10-CM

## 2022-06-14 DIAGNOSIS — F05 DELIRIUM SUPERIMPOSED ON DEMENTIA: ICD-10-CM

## 2022-06-14 DIAGNOSIS — I10 ESSENTIAL HYPERTENSION: ICD-10-CM

## 2022-06-14 DIAGNOSIS — G93.40 ENCEPHALOPATHY: ICD-10-CM

## 2022-06-14 PROBLEM — F03.918 DEMENTIA WITH BEHAVIORAL DISTURBANCE: Status: ACTIVE | Noted: 2022-06-14

## 2022-06-14 LAB
ALBUMIN SERPL BCP-MCNC: 3.2 G/DL (ref 3.5–5.2)
ALLENS TEST: ABNORMAL
ALP SERPL-CCNC: 98 U/L (ref 55–135)
ALT SERPL W/O P-5'-P-CCNC: 14 U/L (ref 10–44)
AMMONIA PLAS-SCNC: 23 UMOL/L (ref 10–50)
ANION GAP SERPL CALC-SCNC: 10 MMOL/L (ref 8–16)
AST SERPL-CCNC: 8 U/L (ref 10–40)
BACTERIA #/AREA URNS AUTO: ABNORMAL /HPF
BASOPHILS # BLD AUTO: 0.04 K/UL (ref 0–0.2)
BASOPHILS NFR BLD: 0.2 % (ref 0–1.9)
BILIRUB SERPL-MCNC: 0.8 MG/DL (ref 0.1–1)
BILIRUB UR QL STRIP: NEGATIVE
BNP SERPL-MCNC: 86 PG/ML (ref 0–99)
BUN SERPL-MCNC: 20 MG/DL (ref 8–23)
CALCIUM SERPL-MCNC: 9.2 MG/DL (ref 8.7–10.5)
CHLORIDE SERPL-SCNC: 101 MMOL/L (ref 95–110)
CLARITY UR REFRACT.AUTO: ABNORMAL
CO2 SERPL-SCNC: 24 MMOL/L (ref 23–29)
COLOR UR AUTO: YELLOW
CREAT SERPL-MCNC: 1.3 MG/DL (ref 0.5–1.4)
DIFFERENTIAL METHOD: ABNORMAL
EOSINOPHIL # BLD AUTO: 0 K/UL (ref 0–0.5)
EOSINOPHIL NFR BLD: 0 % (ref 0–8)
ERYTHROCYTE [DISTWIDTH] IN BLOOD BY AUTOMATED COUNT: 15.5 % (ref 11.5–14.5)
EST. GFR  (AFRICAN AMERICAN): >60 ML/MIN/1.73 M^2
EST. GFR  (NON AFRICAN AMERICAN): 53.4 ML/MIN/1.73 M^2
GLUCOSE SERPL-MCNC: 152 MG/DL (ref 70–110)
GLUCOSE UR QL STRIP: NEGATIVE
HCO3 UR-SCNC: 27.6 MMOL/L (ref 24–28)
HCT VFR BLD AUTO: 42.8 % (ref 40–54)
HGB BLD-MCNC: 14.1 G/DL (ref 14–18)
HGB UR QL STRIP: NEGATIVE
HYALINE CASTS UR QL AUTO: 4 /LPF
IMM GRANULOCYTES # BLD AUTO: 0.25 K/UL (ref 0–0.04)
IMM GRANULOCYTES NFR BLD AUTO: 1.5 % (ref 0–0.5)
KETONES UR QL STRIP: NEGATIVE
LACTATE SERPL-SCNC: 1.1 MMOL/L (ref 0.5–2.2)
LEUKOCYTE ESTERASE UR QL STRIP: ABNORMAL
LIPASE SERPL-CCNC: 14 U/L (ref 4–60)
LYMPHOCYTES # BLD AUTO: 1.7 K/UL (ref 1–4.8)
LYMPHOCYTES NFR BLD: 10.2 % (ref 18–48)
MAGNESIUM SERPL-MCNC: 2.1 MG/DL (ref 1.6–2.6)
MCH RBC QN AUTO: 29.2 PG (ref 27–31)
MCHC RBC AUTO-ENTMCNC: 32.9 G/DL (ref 32–36)
MCV RBC AUTO: 89 FL (ref 82–98)
MICROSCOPIC COMMENT: ABNORMAL
MONOCYTES # BLD AUTO: 1.4 K/UL (ref 0.3–1)
MONOCYTES NFR BLD: 8.2 % (ref 4–15)
NEUTROPHILS # BLD AUTO: 13.4 K/UL (ref 1.8–7.7)
NEUTROPHILS NFR BLD: 79.9 % (ref 38–73)
NITRITE UR QL STRIP: NEGATIVE
NRBC BLD-RTO: 0 /100 WBC
PCO2 BLDA: 47.7 MMHG (ref 35–45)
PH SMN: 7.37 [PH] (ref 7.35–7.45)
PH UR STRIP: 5 [PH] (ref 5–8)
PLATELET # BLD AUTO: 198 K/UL (ref 150–450)
PMV BLD AUTO: 9.4 FL (ref 9.2–12.9)
PO2 BLDA: 66 MMHG (ref 40–60)
POC BE: 2 MMOL/L
POC SATURATED O2: 92 % (ref 95–100)
POC TCO2: 29 MMOL/L (ref 24–29)
POTASSIUM SERPL-SCNC: 3.8 MMOL/L (ref 3.5–5.1)
PROT SERPL-MCNC: 6.8 G/DL (ref 6–8.4)
PROT UR QL STRIP: ABNORMAL
RBC # BLD AUTO: 4.83 M/UL (ref 4.6–6.2)
RBC #/AREA URNS AUTO: 4 /HPF (ref 0–4)
SAMPLE: ABNORMAL
SITE: ABNORMAL
SODIUM SERPL-SCNC: 135 MMOL/L (ref 136–145)
SP GR UR STRIP: 1.02 (ref 1–1.03)
SQUAMOUS #/AREA URNS AUTO: 0 /HPF
TROPONIN I SERPL DL<=0.01 NG/ML-MCNC: 0.01 NG/ML (ref 0–0.03)
TSH SERPL DL<=0.005 MIU/L-ACNC: 1.41 UIU/ML (ref 0.4–4)
URN SPEC COLLECT METH UR: ABNORMAL
WBC # BLD AUTO: 16.8 K/UL (ref 3.9–12.7)
WBC #/AREA URNS AUTO: 12 /HPF (ref 0–5)

## 2022-06-14 PROCEDURE — 87088 URINE BACTERIA CULTURE: CPT | Performed by: EMERGENCY MEDICINE

## 2022-06-14 PROCEDURE — 25000003 PHARM REV CODE 250: Performed by: STUDENT IN AN ORGANIZED HEALTH CARE EDUCATION/TRAINING PROGRAM

## 2022-06-14 PROCEDURE — 99900035 HC TECH TIME PER 15 MIN (STAT)

## 2022-06-14 PROCEDURE — G0378 HOSPITAL OBSERVATION PER HR: HCPCS

## 2022-06-14 PROCEDURE — 82803 BLOOD GASES ANY COMBINATION: CPT

## 2022-06-14 PROCEDURE — 84443 ASSAY THYROID STIM HORMONE: CPT | Performed by: EMERGENCY MEDICINE

## 2022-06-14 PROCEDURE — 80053 COMPREHEN METABOLIC PANEL: CPT | Performed by: EMERGENCY MEDICINE

## 2022-06-14 PROCEDURE — 63600175 PHARM REV CODE 636 W HCPCS: Performed by: STUDENT IN AN ORGANIZED HEALTH CARE EDUCATION/TRAINING PROGRAM

## 2022-06-14 PROCEDURE — 87186 SC STD MICRODIL/AGAR DIL: CPT | Performed by: EMERGENCY MEDICINE

## 2022-06-14 PROCEDURE — 83735 ASSAY OF MAGNESIUM: CPT | Performed by: EMERGENCY MEDICINE

## 2022-06-14 PROCEDURE — 83880 ASSAY OF NATRIURETIC PEPTIDE: CPT | Performed by: EMERGENCY MEDICINE

## 2022-06-14 PROCEDURE — 99285 EMERGENCY DEPT VISIT HI MDM: CPT | Mod: 25

## 2022-06-14 PROCEDURE — 27000190 HC CPAP FULL FACE MASK W/VALVE

## 2022-06-14 PROCEDURE — 84484 ASSAY OF TROPONIN QUANT: CPT | Performed by: EMERGENCY MEDICINE

## 2022-06-14 PROCEDURE — 81001 URINALYSIS AUTO W/SCOPE: CPT | Performed by: EMERGENCY MEDICINE

## 2022-06-14 PROCEDURE — 82140 ASSAY OF AMMONIA: CPT | Performed by: EMERGENCY MEDICINE

## 2022-06-14 PROCEDURE — 93010 ELECTROCARDIOGRAM REPORT: CPT | Mod: ,,, | Performed by: INTERNAL MEDICINE

## 2022-06-14 PROCEDURE — 25000003 PHARM REV CODE 250: Performed by: EMERGENCY MEDICINE

## 2022-06-14 PROCEDURE — 83605 ASSAY OF LACTIC ACID: CPT | Performed by: EMERGENCY MEDICINE

## 2022-06-14 PROCEDURE — 85025 COMPLETE CBC W/AUTO DIFF WBC: CPT | Performed by: EMERGENCY MEDICINE

## 2022-06-14 PROCEDURE — 94660 CPAP INITIATION&MGMT: CPT

## 2022-06-14 PROCEDURE — P9612 CATHETERIZE FOR URINE SPEC: HCPCS

## 2022-06-14 PROCEDURE — 96365 THER/PROPH/DIAG IV INF INIT: CPT | Performed by: EMERGENCY MEDICINE

## 2022-06-14 PROCEDURE — 93010 EKG 12-LEAD: ICD-10-PCS | Mod: ,,, | Performed by: INTERNAL MEDICINE

## 2022-06-14 PROCEDURE — 96372 THER/PROPH/DIAG INJ SC/IM: CPT | Performed by: STUDENT IN AN ORGANIZED HEALTH CARE EDUCATION/TRAINING PROGRAM

## 2022-06-14 PROCEDURE — 99285 EMERGENCY DEPT VISIT HI MDM: CPT | Mod: ,,, | Performed by: EMERGENCY MEDICINE

## 2022-06-14 PROCEDURE — 93005 ELECTROCARDIOGRAM TRACING: CPT

## 2022-06-14 PROCEDURE — 87086 URINE CULTURE/COLONY COUNT: CPT | Performed by: EMERGENCY MEDICINE

## 2022-06-14 PROCEDURE — 99285 PR EMERGENCY DEPT VISIT,LEVEL V: ICD-10-PCS | Mod: ,,, | Performed by: EMERGENCY MEDICINE

## 2022-06-14 PROCEDURE — 87077 CULTURE AEROBIC IDENTIFY: CPT | Performed by: EMERGENCY MEDICINE

## 2022-06-14 PROCEDURE — 83690 ASSAY OF LIPASE: CPT | Performed by: EMERGENCY MEDICINE

## 2022-06-14 PROCEDURE — 87040 BLOOD CULTURE FOR BACTERIA: CPT | Mod: 59 | Performed by: EMERGENCY MEDICINE

## 2022-06-14 RX ORDER — MIRTAZAPINE 15 MG/1
30 TABLET, FILM COATED ORAL NIGHTLY
Status: DISCONTINUED | OUTPATIENT
Start: 2022-06-14 | End: 2022-06-16

## 2022-06-14 RX ORDER — LAMOTRIGINE 25 MG/1
100 TABLET ORAL 2 TIMES DAILY
Status: DISCONTINUED | OUTPATIENT
Start: 2022-06-15 | End: 2022-06-20 | Stop reason: HOSPADM

## 2022-06-14 RX ORDER — SODIUM CHLORIDE, SODIUM LACTATE, POTASSIUM CHLORIDE, CALCIUM CHLORIDE 600; 310; 30; 20 MG/100ML; MG/100ML; MG/100ML; MG/100ML
INJECTION, SOLUTION INTRAVENOUS CONTINUOUS
Status: ACTIVE | OUTPATIENT
Start: 2022-06-14 | End: 2022-06-15

## 2022-06-14 RX ORDER — LISINOPRIL 20 MG/1
20 TABLET ORAL 2 TIMES DAILY
Status: DISCONTINUED | OUTPATIENT
Start: 2022-06-14 | End: 2022-06-20 | Stop reason: HOSPADM

## 2022-06-14 RX ORDER — INSULIN ASPART 100 [IU]/ML
0-5 INJECTION, SOLUTION INTRAVENOUS; SUBCUTANEOUS
Status: DISCONTINUED | OUTPATIENT
Start: 2022-06-14 | End: 2022-06-20 | Stop reason: HOSPADM

## 2022-06-14 RX ORDER — NALOXONE HCL 0.4 MG/ML
0.02 VIAL (ML) INJECTION
Status: DISCONTINUED | OUTPATIENT
Start: 2022-06-14 | End: 2022-06-20 | Stop reason: HOSPADM

## 2022-06-14 RX ORDER — ASPIRIN 81 MG/1
81 TABLET ORAL DAILY
Status: DISCONTINUED | OUTPATIENT
Start: 2022-06-15 | End: 2022-06-20 | Stop reason: HOSPADM

## 2022-06-14 RX ORDER — QUETIAPINE FUMARATE 200 MG/1
200 TABLET, FILM COATED ORAL
Status: COMPLETED | OUTPATIENT
Start: 2022-06-14 | End: 2022-06-14

## 2022-06-14 RX ORDER — QUETIAPINE FUMARATE 25 MG/1
100 TABLET, FILM COATED ORAL DAILY
Status: DISCONTINUED | OUTPATIENT
Start: 2022-06-15 | End: 2022-06-20 | Stop reason: HOSPADM

## 2022-06-14 RX ORDER — LIDOCAINE 50 MG/G
1 PATCH TOPICAL DAILY PRN
Status: DISCONTINUED | OUTPATIENT
Start: 2022-06-14 | End: 2022-06-20 | Stop reason: HOSPADM

## 2022-06-14 RX ORDER — GLUCAGON 1 MG
1 KIT INJECTION
Status: DISCONTINUED | OUTPATIENT
Start: 2022-06-14 | End: 2022-06-20 | Stop reason: HOSPADM

## 2022-06-14 RX ORDER — ATORVASTATIN CALCIUM 10 MG/1
10 TABLET, FILM COATED ORAL DAILY
Status: DISCONTINUED | OUTPATIENT
Start: 2022-06-15 | End: 2022-06-20 | Stop reason: HOSPADM

## 2022-06-14 RX ORDER — QUETIAPINE FUMARATE 200 MG/1
200 TABLET, FILM COATED ORAL NIGHTLY
Status: DISCONTINUED | OUTPATIENT
Start: 2022-06-15 | End: 2022-06-20 | Stop reason: HOSPADM

## 2022-06-14 RX ORDER — DONEPEZIL HYDROCHLORIDE 10 MG/1
10 TABLET, FILM COATED ORAL NIGHTLY
Status: DISCONTINUED | OUTPATIENT
Start: 2022-06-14 | End: 2022-06-20 | Stop reason: HOSPADM

## 2022-06-14 RX ORDER — SODIUM CHLORIDE 0.9 % (FLUSH) 0.9 %
10 SYRINGE (ML) INJECTION EVERY 12 HOURS PRN
Status: DISCONTINUED | OUTPATIENT
Start: 2022-06-14 | End: 2022-06-20 | Stop reason: HOSPADM

## 2022-06-14 RX ORDER — IBUPROFEN 200 MG
16 TABLET ORAL
Status: DISCONTINUED | OUTPATIENT
Start: 2022-06-14 | End: 2022-06-20 | Stop reason: HOSPADM

## 2022-06-14 RX ORDER — ALBUTEROL SULFATE 90 UG/1
2 AEROSOL, METERED RESPIRATORY (INHALATION) EVERY 6 HOURS PRN
Status: DISCONTINUED | OUTPATIENT
Start: 2022-06-14 | End: 2022-06-20 | Stop reason: HOSPADM

## 2022-06-14 RX ORDER — ASCORBIC ACID 500 MG
500 TABLET ORAL DAILY
Status: DISCONTINUED | OUTPATIENT
Start: 2022-06-15 | End: 2022-06-20 | Stop reason: HOSPADM

## 2022-06-14 RX ORDER — FLUOXETINE HYDROCHLORIDE 20 MG/1
20 CAPSULE ORAL 2 TIMES DAILY
Status: DISCONTINUED | OUTPATIENT
Start: 2022-06-14 | End: 2022-06-20 | Stop reason: HOSPADM

## 2022-06-14 RX ORDER — TAMSULOSIN HYDROCHLORIDE 0.4 MG/1
0.4 CAPSULE ORAL DAILY
Status: DISCONTINUED | OUTPATIENT
Start: 2022-06-15 | End: 2022-06-20 | Stop reason: HOSPADM

## 2022-06-14 RX ORDER — IBUPROFEN 200 MG
24 TABLET ORAL
Status: DISCONTINUED | OUTPATIENT
Start: 2022-06-14 | End: 2022-06-20 | Stop reason: HOSPADM

## 2022-06-14 RX ORDER — MEMANTINE HYDROCHLORIDE 10 MG/1
10 TABLET ORAL 2 TIMES DAILY
Status: DISCONTINUED | OUTPATIENT
Start: 2022-06-14 | End: 2022-06-20 | Stop reason: HOSPADM

## 2022-06-14 RX ORDER — FUROSEMIDE 20 MG/1
20 TABLET ORAL DAILY
Status: DISCONTINUED | OUTPATIENT
Start: 2022-06-15 | End: 2022-06-20 | Stop reason: HOSPADM

## 2022-06-14 RX ORDER — CHOLECALCIFEROL (VITAMIN D3) 25 MCG
1000 TABLET ORAL DAILY
Status: DISCONTINUED | OUTPATIENT
Start: 2022-06-15 | End: 2022-06-20 | Stop reason: HOSPADM

## 2022-06-14 RX ORDER — ENOXAPARIN SODIUM 100 MG/ML
40 INJECTION SUBCUTANEOUS EVERY 12 HOURS
Status: DISCONTINUED | OUTPATIENT
Start: 2022-06-14 | End: 2022-06-20 | Stop reason: HOSPADM

## 2022-06-14 RX ORDER — LAMOTRIGINE 100 MG/1
100 TABLET ORAL
Status: COMPLETED | OUTPATIENT
Start: 2022-06-14 | End: 2022-06-14

## 2022-06-14 RX ADMIN — DONEPEZIL HYDROCHLORIDE 10 MG: 10 TABLET ORAL at 09:06

## 2022-06-14 RX ADMIN — SODIUM CHLORIDE, SODIUM LACTATE, POTASSIUM CHLORIDE, AND CALCIUM CHLORIDE: .6; .31; .03; .02 INJECTION, SOLUTION INTRAVENOUS at 10:06

## 2022-06-14 RX ADMIN — MIRTAZAPINE 30 MG: 15 TABLET, FILM COATED ORAL at 09:06

## 2022-06-14 RX ADMIN — MEMANTINE 10 MG: 10 TABLET ORAL at 09:06

## 2022-06-14 RX ADMIN — QUETIAPINE FUMARATE 200 MG: 200 TABLET ORAL at 08:06

## 2022-06-14 RX ADMIN — LAMOTRIGINE 100 MG: 100 TABLET ORAL at 07:06

## 2022-06-14 RX ADMIN — LISINOPRIL 20 MG: 20 TABLET ORAL at 09:06

## 2022-06-14 RX ADMIN — CEFTRIAXONE 1 G: 1 INJECTION, SOLUTION INTRAVENOUS at 10:06

## 2022-06-14 RX ADMIN — FLUOXETINE 20 MG: 20 CAPSULE ORAL at 09:06

## 2022-06-14 RX ADMIN — ENOXAPARIN SODIUM 40 MG: 100 INJECTION SUBCUTANEOUS at 09:06

## 2022-06-14 NOTE — PROVIDER PROGRESS NOTES - EMERGENCY DEPT.
Encounter Date: 6/14/2022    ED Physician Progress Notes           ED Physician Hand-off Note:    ED Course: I assumed care of patient from off-going ED physician, Dr Wong.  Briefly, Patient is a 76y M with AMS. At the time of signout plan was pending all labs.       ED Course as of 06/14/22 2325 Tue Jun 14, 2022   1518 CT head reviewed, unremarkable for acute process [HS]   1518 Chest x-ray without acute focal findings [HS]   1643 WBC(!): 16.80  With shift   [HS]   1644 Pending UA. Asked nurse for sample [HS]   1824 Wife reports some increasing agitation consistent with the patient's baseline sundowning. Seroquel ordered.  [HS]      ED Course User Index  [HS] Ernst Golden MD       Leukocytosis noted on labs, no infectious etiology identified.  Given increasing agitation and hallucinations, patient will be admitted to the hospital for further altered mental status evaluation.      Disposition: Admit     Impression:     Final diagnoses:  [R41.82] Altered mental status

## 2022-06-14 NOTE — ED NOTES
Pt BIB EMS for witnessed fall at home this am, pt was too weak to get up from floor.  PMHx of dementia, pt more confused over last 2 days per wife, alert and oriented to his name only.  Per family at home, no LOC, pt did not hit head, no obvious trauma noted.  Pt has redness and 1+ edema to BLE, peripheral pulses and neuro intact. Pt RR even/unlabored, lung sounds clear, SPO2 93% on RA.  Pt calm and cooperative at this time.  Condom catheter placed. Bed low/locked, siderails upx2.

## 2022-06-14 NOTE — ED NOTES
Pt becoming agitated, pt's wife states pt sundowns at home, takes 200 mg seroquel nightly.  MD notified.

## 2022-06-14 NOTE — ED NOTES
Bed: [facility]  Expected date: 6/14/22  Expected time:   Means of arrival:   Comments:   Please contact pt and let her know that discussed holter monitor with her PCP and we do not recommend any changes - can proceed with scheduled surgery and will fax over preop note to optho.  Thanks.

## 2022-06-14 NOTE — ED PROVIDER NOTES
Encounter Date: 6/14/2022       History     Chief Complaint   Patient presents with    Fall     Trip and fall from home; hx dementia     The patient is a 76-year-old who presents via ambulance.  His wife is at the bedside to provide history.  He has severe Alzheimer's.  She reports a change in his mental status that began yesterday afternoon.  He has become more confused from his baseline.  Additionally, he has been micturating on himself which is abnormal.  He also refuses to eat.  His last bowel movement was yesterday and it was normal.    He has a past medical history of Alzheimer's dementia, Depression, HEARING LOSS, Hyperlipidemia, Hypertension, JUICE (obstructive sleep apnea), Personal history of colonic polyps, Prediabetes, Restless legs syndrome (RLS), and Sleep apnea.    The history is provided by the spouse. No  was used.     Review of patient's allergies indicates:  No Known Allergies  Past Medical History:   Diagnosis Date    Alzheimer's dementia     Depression     HEARING LOSS     Hyperlipidemia     Hypertension     JUICE (obstructive sleep apnea)     Personal history of colonic polyps     Prediabetes     Restless legs syndrome (RLS)     Sleep apnea      Past Surgical History:   Procedure Laterality Date    aaa stent      KNEE SURGERY      KNEE SURGERY      leg fx  repair     Family History   Problem Relation Age of Onset    Arthritis Mother     Hypertension Mother     Hearing loss Mother     Pneumonia Mother     Parkinsonism Father     Hypertension Brother     Colon cancer Brother     Hypertension Brother     Hypertension Brother      Social History     Tobacco Use    Smoking status: Current Every Day Smoker     Packs/day: 0.50     Years: 50.00     Pack years: 25.00    Smokeless tobacco: Never Used   Substance Use Topics    Alcohol use: No    Drug use: No     Review of Systems   Unable to perform ROS: Dementia       Physical Exam     Initial Vitals [06/14/22  1257]   BP Pulse Resp Temp SpO2   (!) 153/102 98 20 98 °F (36.7 °C) (!) 93 %      MAP       --         Physical Exam    Nursing note and vitals reviewed.  Constitutional: He appears lethargic. He is not diaphoretic.   HENT:   Head: Normocephalic and atraumatic.   Eyes: Conjunctivae are normal. Pupils are equal, round, and reactive to light. No scleral icterus.   Cardiovascular: Normal rate, regular rhythm and normal heart sounds.   No murmur heard.  Pulmonary/Chest: No stridor. No respiratory distress. He has no wheezes. He has no rhonchi.   Abdominal: Abdomen is protuberant.     Neurological: He appears lethargic. He is disoriented. GCS eye subscore is 2. GCS verbal subscore is 4. GCS motor subscore is 5.   No facial asymmetry.   Skin: Skin is warm and dry. No pallor.   Psychiatric: He is not actively hallucinating. He is attentive.         ED Course   Procedures  Labs Reviewed   CBC W/ AUTO DIFFERENTIAL - Abnormal; Notable for the following components:       Result Value    WBC 16.80 (*)     RDW 15.5 (*)     Immature Granulocytes 1.5 (*)     Gran # (ANC) 13.4 (*)     Immature Grans (Abs) 0.25 (*)     Mono # 1.4 (*)     Gran % 79.9 (*)     Lymph % 10.2 (*)     All other components within normal limits   COMPREHENSIVE METABOLIC PANEL - Abnormal; Notable for the following components:    Sodium 135 (*)     Glucose 152 (*)     Albumin 3.2 (*)     AST 8 (*)     eGFR if non  53.4 (*)     All other components within normal limits   URINALYSIS, REFLEX TO URINE CULTURE - Abnormal; Notable for the following components:    Appearance, UA Hazy (*)     Protein, UA 1+ (*)     Leukocytes, UA 1+ (*)     All other components within normal limits    Narrative:     Specimen Source->Urine   URINALYSIS MICROSCOPIC - Abnormal; Notable for the following components:    WBC, UA 12 (*)     Hyaline Casts, UA 4 (*)     All other components within normal limits    Narrative:     Specimen Source->Urine   ISTAT PROCEDURE -  Abnormal; Notable for the following components:    POC PCO2 47.7 (*)     POC PO2 66 (*)     POC SATURATED O2 92 (*)     All other components within normal limits   CULTURE, URINE   LACTIC ACID, PLASMA   TROPONIN I   B-TYPE NATRIURETIC PEPTIDE   AMMONIA   MAGNESIUM   LIPASE   TSH   TSH    Narrative:     add on tsh per dr tate obi/order#[id number] @20:43 06/14/2022   POCT GLUCOSE MONITORING CONTINUOUS        ECG Results          EKG 12-lead (Final result)  Result time 06/15/22 11:13:36    Final result by Interface, Lab In [facility] (06/15/22 11:13:36)                 Narrative:    Test Reason : R41.82,    Vent. Rate : 082 BPM     Atrial Rate : 082 BPM     P-R Int : 198 ms          QRS Dur : 104 ms      QT Int : 372 ms       P-R-T Axes : 074 063 080 degrees     QTc Int : 434 ms    Normal sinus rhythm  Nonspecific T wave abnormality  Abnormal ECG  When compared with ECG of 13-MAR-2022 11:59,  No significant change was found  Confirmed by BRE TAYLOR MD ([id number]) on 6/15/2022 11:13:32 AM    Referred By: [email or web address]   SELF           Confirmed By:BRE TAYLOR MD                            Imaging Results          CT Head Without Contrast (Final result)  Result time 06/14/22 14:49:20    Final result by Brandon Rey MD (06/14/22 14:49:20)                 Impression:      Examination mildly degraded by patient motion artifact.    No evidence of acute hemorrhage or major vascular distribution infarct.    Moderate cerebral volume loss.    Remote left cerebellar infarct.      Electronically signed by: Brandon Rey MD  Date:    06/14/2022  Time:    14:49             Narrative:    EXAMINATION:  CT HEAD WITHOUT CONTRAST    CLINICAL HISTORY:  Mental status change, unknown cause;    TECHNIQUE:  Low dose axial CT images obtained throughout the head without the use of intravenous contrast.  Axial, sagittal and coronal reconstructions were performed.    Examination mildly degraded by patient motion  artifact.    COMPARISON:  3/7/22.    FINDINGS:  Intracranial compartment:    Prominence of the ventricles and sulci compatible with  cerebral volume loss. Configuration not suggestive of hydrocephalus.    The brain parenchyma appears unchanged.  Stable remote left cerebellar infarct..  No new parenchymal mass, hemorrhage, edema or major vascular distribution infarct.    No extra-axial blood or fluid collections.    Skull/extracranial contents (limited evaluation):    No displaced calvarial fracture.    The mastoid air cells and visualized paranasal sinuses are essentially clear.                               X-Ray Chest AP Portable (Final result)  Result time 06/14/22 14:55:22    Final result by Nura Waters MD (06/14/22 14:55:22)                 Impression:      See above      Electronically signed by: Nura Waters MD  Date:    06/14/2022  Time:    14:55             Narrative:    EXAMINATION:  XR CHEST AP PORTABLE    CLINICAL HISTORY:  Altered mental status;    TECHNIQUE:  Single frontal view of the chest was performed.    COMPARISON:  No 03/13/2022 ne    FINDINGS:  Cardiomegaly.  Calcified mediastinal lymph nodes.  Slight degree of diffuse accentuation interstitial markings and/or interstitial edema.  No significant airspace consolidation or pleural effusion identified                                 Medications   albuterol inhaler 2 puff (has no administration in time range)   ascorbic acid (vitamin C) tablet 500 mg (500 mg Oral Given 6/15/22 0838)   aspirin EC tablet 81 mg (81 mg Oral Given 6/15/22 0838)   atorvastatin tablet 10 mg (10 mg Oral Given 6/15/22 0838)   donepeziL tablet 10 mg (10 mg Oral Given 6/14/22 2154)   FLUoxetine capsule 20 mg (20 mg Oral Given 6/15/22 0838)   furosemide tablet 20 mg (20 mg Oral Given 6/15/22 0838)   lamoTRIgine tablet 100 mg (100 mg Oral Given 6/15/22 0838)   LIDOcaine 5 % patch 1 patch (has no administration in time range)   lisinopriL tablet 20 mg (20 mg Oral Given  6/15/22 0839)   memantine tablet 10 mg (10 mg Oral Given 6/15/22 0900)   mirtazapine tablet 30 mg (30 mg Oral Given 6/14/22 2155)   QUEtiapine tablet 100 mg (100 mg Oral Given 6/15/22 0839)   QUEtiapine tablet 200 mg (has no administration in time range)   tamsulosin 24 hr capsule 0.4 mg (0.4 mg Oral Given 6/15/22 0838)   vitamin D 1000 units tablet 1,000 Units (1,000 Units Oral Given 6/15/22 0838)   sodium chloride 0.9% flush 10 mL (has no administration in time range)   naloxone 0.4 mg/mL injection 0.02 mg (has no administration in time range)   glucose chewable tablet 16 g (has no administration in time range)   glucose chewable tablet 24 g (has no administration in time range)   glucagon (human recombinant) injection 1 mg (has no administration in time range)   dextrose 10% bolus 125 mL (has no administration in time range)   dextrose 10% bolus 250 mL (has no administration in time range)   enoxaparin injection 40 mg (40 mg Subcutaneous Given 6/15/22 0838)   insulin aspart U-100 pen 0-5 Units (has no administration in time range)   lactated ringers infusion ( Intravenous New Bag 6/14/22 2213)   cefTRIAXone (ROCEPHIN) 2 g/50 mL D5W IVPB (has no administration in time range)   tuberculin injection 5 Units (has no administration in time range)   QUEtiapine tablet 200 mg (200 mg Oral Given 6/14/22 2036)   lamoTRIgine tablet 100 mg (100 mg Oral Given 6/14/22 1924)     Medical Decision Making:   History:   I obtained history from: someone other than patient.       <> Summary of History: Patient's wife provided the above history.  Old Medical Records: I decided to obtain old medical records.  Old Records Summarized: other records.       <> Summary of Records: Past medical history reviewed as above.  Clinical Tests:   Lab Tests: Ordered and Reviewed  Radiological Study: Ordered and Reviewed    MDM:  The patient underwent emergent evaluation for mental status change.  Arrival vital signs were unremarkable.  He was  extremely lethargic and unable to provide history or assist much with the physical examination.  Broad differential included acute intracranial pathology like hemorrhage and masses, dehydration, infectious etiologies, medications side effects, and decline related to chronic disease.  Workup consisted of monitoring, serial exams, CT imaging of the brain, and labs.  There are no specific findings on workup to explain the patient's mental status change.  He was admitted to Hospital Medicine for further evaluation and management.             ED Course as of 06/15/22 1251   Tue Jun 14, 2022   1518 CT head reviewed, unremarkable for acute process [HS]   1518 Chest x-ray without acute focal findings [HS]   1643 WBC(!): 16.80  With shift   [HS]   1644 Pending UA. Asked nurse for sample [HS]   1824 Wife reports some increasing agitation consistent with the patient's baseline sundowning. Seroquel ordered.  [HS]      ED Course User Index  [HS] Ernst Golden MD             Clinical Impression:   Final diagnoses:  [R41.82] Altered mental status          ED Disposition Condition    Observation               Cyril Wong III, MD  06/15/22 [id number]

## 2022-06-15 LAB
ALBUMIN SERPL BCP-MCNC: 2.7 G/DL (ref 3.5–5.2)
ALP SERPL-CCNC: 86 U/L (ref 55–135)
ALT SERPL W/O P-5'-P-CCNC: 9 U/L (ref 10–44)
ANION GAP SERPL CALC-SCNC: 12 MMOL/L (ref 8–16)
AST SERPL-CCNC: 9 U/L (ref 10–40)
BASOPHILS # BLD AUTO: 0.03 K/UL (ref 0–0.2)
BASOPHILS NFR BLD: 0.2 % (ref 0–1.9)
BILIRUB SERPL-MCNC: 0.6 MG/DL (ref 0.1–1)
BUN SERPL-MCNC: 22 MG/DL (ref 8–23)
CALCIUM SERPL-MCNC: 8.8 MG/DL (ref 8.7–10.5)
CHLORIDE SERPL-SCNC: 106 MMOL/L (ref 95–110)
CO2 SERPL-SCNC: 23 MMOL/L (ref 23–29)
CREAT SERPL-MCNC: 1.1 MG/DL (ref 0.5–1.4)
DIFFERENTIAL METHOD: ABNORMAL
DOHLE BOD BLD QL SMEAR: PRESENT
EOSINOPHIL # BLD AUTO: 0.1 K/UL (ref 0–0.5)
EOSINOPHIL NFR BLD: 0.6 % (ref 0–8)
ERYTHROCYTE [DISTWIDTH] IN BLOOD BY AUTOMATED COUNT: 15.6 % (ref 11.5–14.5)
EST. GFR  (AFRICAN AMERICAN): >60 ML/MIN/1.73 M^2
EST. GFR  (NON AFRICAN AMERICAN): >60 ML/MIN/1.73 M^2
GLUCOSE SERPL-MCNC: 103 MG/DL (ref 70–110)
HCT VFR BLD AUTO: 40.9 % (ref 40–54)
HGB BLD-MCNC: 13 G/DL (ref 14–18)
IMM GRANULOCYTES # BLD AUTO: 0.15 K/UL (ref 0–0.04)
IMM GRANULOCYTES NFR BLD AUTO: 1.2 % (ref 0–0.5)
LYMPHOCYTES # BLD AUTO: 1.7 K/UL (ref 1–4.8)
LYMPHOCYTES NFR BLD: 13.1 % (ref 18–48)
MAGNESIUM SERPL-MCNC: 2.3 MG/DL (ref 1.6–2.6)
MCH RBC QN AUTO: 28.1 PG (ref 27–31)
MCHC RBC AUTO-ENTMCNC: 31.8 G/DL (ref 32–36)
MCV RBC AUTO: 89 FL (ref 82–98)
MONOCYTES # BLD AUTO: 1.1 K/UL (ref 0.3–1)
MONOCYTES NFR BLD: 8.9 % (ref 4–15)
NEUTROPHILS # BLD AUTO: 9.6 K/UL (ref 1.8–7.7)
NEUTROPHILS NFR BLD: 76 % (ref 38–73)
NRBC BLD-RTO: 0 /100 WBC
PHOSPHATE SERPL-MCNC: 2.2 MG/DL (ref 2.7–4.5)
PLATELET # BLD AUTO: 179 K/UL (ref 150–450)
PLATELET BLD QL SMEAR: ABNORMAL
PMV BLD AUTO: 9.4 FL (ref 9.2–12.9)
POCT GLUCOSE: 100 MG/DL (ref 70–110)
POCT GLUCOSE: 103 MG/DL (ref 70–110)
POCT GLUCOSE: 107 MG/DL (ref 70–110)
POCT GLUCOSE: 140 MG/DL (ref 70–110)
POTASSIUM SERPL-SCNC: 4 MMOL/L (ref 3.5–5.1)
PROCALCITONIN SERPL IA-MCNC: 1.2 NG/ML
PROT SERPL-MCNC: 6.1 G/DL (ref 6–8.4)
RBC # BLD AUTO: 4.62 M/UL (ref 4.6–6.2)
SODIUM SERPL-SCNC: 141 MMOL/L (ref 136–145)
WBC # BLD AUTO: 12.7 K/UL (ref 3.9–12.7)

## 2022-06-15 PROCEDURE — 99220 PR INITIAL OBSERVATION CARE,LEVL III: CPT | Mod: ,,, | Performed by: INTERNAL MEDICINE

## 2022-06-15 PROCEDURE — 85025 COMPLETE CBC W/AUTO DIFF WBC: CPT | Performed by: STUDENT IN AN ORGANIZED HEALTH CARE EDUCATION/TRAINING PROGRAM

## 2022-06-15 PROCEDURE — 84145 PROCALCITONIN (PCT): CPT | Performed by: STUDENT IN AN ORGANIZED HEALTH CARE EDUCATION/TRAINING PROGRAM

## 2022-06-15 PROCEDURE — 96372 THER/PROPH/DIAG INJ SC/IM: CPT | Performed by: STUDENT IN AN ORGANIZED HEALTH CARE EDUCATION/TRAINING PROGRAM

## 2022-06-15 PROCEDURE — 63600175 PHARM REV CODE 636 W HCPCS: Performed by: INTERNAL MEDICINE

## 2022-06-15 PROCEDURE — 83735 ASSAY OF MAGNESIUM: CPT | Performed by: STUDENT IN AN ORGANIZED HEALTH CARE EDUCATION/TRAINING PROGRAM

## 2022-06-15 PROCEDURE — 1158F PR ADVANCE CARE PLANNING DISCUSS DOCUMENTED IN MEDICAL RECORD: ICD-10-PCS | Mod: CPTII,,, | Performed by: INTERNAL MEDICINE

## 2022-06-15 PROCEDURE — G0378 HOSPITAL OBSERVATION PER HR: HCPCS

## 2022-06-15 PROCEDURE — 30200315 PPD INTRADERMAL TEST REV CODE 302

## 2022-06-15 PROCEDURE — 96366 THER/PROPH/DIAG IV INF ADDON: CPT | Performed by: EMERGENCY MEDICINE

## 2022-06-15 PROCEDURE — 80053 COMPREHEN METABOLIC PANEL: CPT | Performed by: STUDENT IN AN ORGANIZED HEALTH CARE EDUCATION/TRAINING PROGRAM

## 2022-06-15 PROCEDURE — 1158F ADVNC CARE PLAN TLK DOCD: CPT | Mod: CPTII,,, | Performed by: INTERNAL MEDICINE

## 2022-06-15 PROCEDURE — 25000003 PHARM REV CODE 250: Performed by: STUDENT IN AN ORGANIZED HEALTH CARE EDUCATION/TRAINING PROGRAM

## 2022-06-15 PROCEDURE — 99220 PR INITIAL OBSERVATION CARE,LEVL III: ICD-10-PCS | Mod: ,,, | Performed by: INTERNAL MEDICINE

## 2022-06-15 PROCEDURE — 36415 COLL VENOUS BLD VENIPUNCTURE: CPT | Performed by: STUDENT IN AN ORGANIZED HEALTH CARE EDUCATION/TRAINING PROGRAM

## 2022-06-15 PROCEDURE — 99900035 HC TECH TIME PER 15 MIN (STAT)

## 2022-06-15 PROCEDURE — 86580 TB INTRADERMAL TEST: CPT

## 2022-06-15 PROCEDURE — 84100 ASSAY OF PHOSPHORUS: CPT | Performed by: STUDENT IN AN ORGANIZED HEALTH CARE EDUCATION/TRAINING PROGRAM

## 2022-06-15 PROCEDURE — 63600175 PHARM REV CODE 636 W HCPCS: Performed by: STUDENT IN AN ORGANIZED HEALTH CARE EDUCATION/TRAINING PROGRAM

## 2022-06-15 PROCEDURE — 94761 N-INVAS EAR/PLS OXIMETRY MLT: CPT

## 2022-06-15 PROCEDURE — 94660 CPAP INITIATION&MGMT: CPT

## 2022-06-15 RX ORDER — ACETAMINOPHEN 500 MG
1000 TABLET ORAL 2 TIMES DAILY
COMMUNITY

## 2022-06-15 RX ADMIN — OXYCODONE HYDROCHLORIDE AND ACETAMINOPHEN 500 MG: 500 TABLET ORAL at 08:06

## 2022-06-15 RX ADMIN — FLUOXETINE 20 MG: 20 CAPSULE ORAL at 08:06

## 2022-06-15 RX ADMIN — ENOXAPARIN SODIUM 40 MG: 100 INJECTION SUBCUTANEOUS at 08:06

## 2022-06-15 RX ADMIN — MEMANTINE 10 MG: 10 TABLET ORAL at 09:06

## 2022-06-15 RX ADMIN — LAMOTRIGINE 100 MG: 25 TABLET ORAL at 08:06

## 2022-06-15 RX ADMIN — LISINOPRIL 20 MG: 20 TABLET ORAL at 08:06

## 2022-06-15 RX ADMIN — MIRTAZAPINE 30 MG: 15 TABLET, FILM COATED ORAL at 08:06

## 2022-06-15 RX ADMIN — CEFTRIAXONE 2 G: 2 INJECTION, SOLUTION INTRAVENOUS at 08:06

## 2022-06-15 RX ADMIN — FUROSEMIDE 20 MG: 20 TABLET ORAL at 08:06

## 2022-06-15 RX ADMIN — Medication 1000 UNITS: at 08:06

## 2022-06-15 RX ADMIN — DONEPEZIL HYDROCHLORIDE 10 MG: 10 TABLET ORAL at 08:06

## 2022-06-15 RX ADMIN — QUETIAPINE FUMARATE 100 MG: 25 TABLET ORAL at 08:06

## 2022-06-15 RX ADMIN — ASPIRIN 81 MG: 81 TABLET, COATED ORAL at 08:06

## 2022-06-15 RX ADMIN — QUETIAPINE FUMARATE 200 MG: 200 TABLET ORAL at 08:06

## 2022-06-15 RX ADMIN — MEMANTINE 10 MG: 10 TABLET ORAL at 08:06

## 2022-06-15 RX ADMIN — TAMSULOSIN HYDROCHLORIDE 0.4 MG: 0.4 CAPSULE ORAL at 08:06

## 2022-06-15 RX ADMIN — TUBERCULIN PURIFIED PROTEIN DERIVATIVE 5 UNITS: 5 INJECTION, SOLUTION INTRADERMAL at 03:06

## 2022-06-15 RX ADMIN — ATORVASTATIN CALCIUM 10 MG: 10 TABLET, FILM COATED ORAL at 08:06

## 2022-06-15 NOTE — PLAN OF CARE
Patient remains disoriented X4.Wound care  consult for BLE erythema. Safety maintained throughout shift.  Bed in low  position call light within reach will , no fall will continue to monitor.

## 2022-06-15 NOTE — ASSESSMENT & PLAN NOTE
Previously on metformin for prediabetes. A1c 6.3, 3 months ago.     - Diabetes diet  - POCT glucose ACHS  - Maintain -180

## 2022-06-15 NOTE — SUBJECTIVE & OBJECTIVE
Past Medical History:   Diagnosis Date    Alzheimer's dementia     Depression     HEARING LOSS     Hyperlipidemia     Hypertension     JUICE (obstructive sleep apnea)     Personal history of colonic polyps     Prediabetes     Restless legs syndrome (RLS)     Sleep apnea        Past Surgical History:   Procedure Laterality Date    aaa stent      KNEE SURGERY      KNEE SURGERY      leg fx  repair       Review of patient's allergies indicates:  No Known Allergies    No current facility-administered medications on file prior to encounter.     Current Outpatient Medications on File Prior to Encounter   Medication Sig    albuterol (PROVENTIL/VENTOLIN HFA) 90 mcg/actuation inhaler Inhale 2 puffs into the lungs every 6 (six) hours as needed for Wheezing.    ascorbic acid (VITAMIN C) 500 MG tablet Take 500 mg by mouth once daily.    aspirin (ECOTRIN) 81 MG EC tablet Take 81 mg by mouth once daily.    atorvastatin (LIPITOR) 10 MG tablet TAKE 1 TABLET(10 MG) BY MOUTH EVERY DAY    azelastine (ASTELIN) 137 mcg (0.1 %) nasal spray 2 sprays (274 mcg total) by Nasal route 2 (two) times daily.    donepeziL (ARICEPT) 10 MG tablet TAKE 1 TABLET(10 MG) BY MOUTH EVERY EVENING    fluocinonide (LIDEX) 0.05 % external solution Use hs for scalp    FLUoxetine 20 MG capsule TAKE 1 CAPSULE(20 MG) BY MOUTH TWICE DAILY    furosemide (LASIX) 20 MG tablet Take 1 tablet (20 mg total) by mouth once daily.    furosemide (LASIX) 20 MG tablet TAKE 1 TABLET EVERY DAY    furosemide (LASIX) 20 MG tablet TAKE 1 TABLET EVERY DAY    HYDROcodone-acetaminophen (NORCO) 5-325 mg per tablet Take 1 tablet by mouth every 6 (six) hours as needed for Pain.    lamoTRIgine (LAMICTAL) 100 MG tablet Take 1 tablet (100 mg total) by mouth 2 (two) times daily.    LIDOcaine (LIDODERM) 5 % Place 1 patch onto the skin daily as needed (neck pain). Remove & Discard patch within 12 hours or as directed by MD    lisinopriL (PRINIVIL,ZESTRIL) 20 MG tablet Take 1 tablet (20 mg total)  by mouth 2 (two) times daily.    memantine (NAMENDA) 10 MG Tab Take 1 tablet (10 mg total) by mouth 2 (two) times a day.    metOLazone (ZAROXOLYN) 5 MG tablet Take 1 tablet (5 mg total) by mouth once daily. for 10 days    mirtazapine (REMERON) 30 MG tablet TAKE 1 TABLET(30 MG) BY MOUTH EVERY NIGHT    multivitamin (THERAGRAN) per tablet Take 1 tablet by mouth once daily.    mupirocin (BACTROBAN) 2 % ointment Apply topically 2 (two) times daily.    QUEtiapine (SEROQUEL) 100 MG Tab TAKE 1 TABLET BY MOUTH EVERY DAY AT 9 AM AND AT 3 PM    QUEtiapine (SEROQUEL) 200 MG Tab Take 1 tablet (200 mg total) by mouth nightly.    tamsulosin (FLOMAX) 0.4 mg Cap TAKE 1 CAPSULE(0.4 MG) BY MOUTH EVERY DAY    tiZANidine (ZANAFLEX) 4 MG tablet Take 1 tablet (4 mg total) by mouth every 8 (eight) hours as needed.    vitamin D 1000 units Tab Take 185 mg by mouth once daily.     Family History       Problem Relation (Age of Onset)    Arthritis Mother    Colon cancer Brother    Hearing loss Mother    Hypertension Mother, Brother, Brother, Brother    Parkinsonism Father    Pneumonia Mother          Tobacco Use    Smoking status: Current Every Day Smoker     Packs/day: 0.50     Years: 50.00     Pack years: 25.00    Smokeless tobacco: Never Used   Substance and Sexual Activity    Alcohol use: No    Drug use: No    Sexual activity: Not on file     Review of Systems   Unable to perform ROS: Mental status change   Objective:     Vital Signs (Most Recent):  Temp: 98.2 °F (36.8 °C) (06/14/22 1852)  Pulse: 80 (06/14/22 1852)  Resp: 18 (06/14/22 1852)  BP: (!) 142/62 (06/14/22 1852)  SpO2: 95 % (06/14/22 1852)   Vital Signs (24h Range):  Temp:  [98 °F (36.7 °C)-98.2 °F (36.8 °C)] 98.2 °F (36.8 °C)  Pulse:  [77-98] 80  Resp:  [18-20] 18  SpO2:  [93 %-98 %] 95 %  BP: (136-153)/() 142/62     Weight: (!) 158.8 kg (350 lb)  Body mass index is 50.22 kg/m².    Physical Exam  Constitutional:       General: He is not in acute distress.     Appearance: He  is obese. He is not ill-appearing, toxic-appearing or diaphoretic.      Comments: Alert and able to follow some commands. Non-cooperative with physical exam   HENT:      Head: Normocephalic and atraumatic.      Nose: Nose normal.      Mouth/Throat:      Mouth: Mucous membranes are moist.   Eyes:      Extraocular Movements: Extraocular movements intact.   Cardiovascular:      Rate and Rhythm: Normal rate and regular rhythm.      Pulses: Normal pulses.      Heart sounds: Normal heart sounds. No murmur heard.  Pulmonary:      Effort: Pulmonary effort is normal. No respiratory distress.      Breath sounds: Normal breath sounds. No wheezing or rales.   Abdominal:      General: Abdomen is flat. Bowel sounds are normal. There is no distension.      Palpations: Abdomen is soft.      Tenderness: There is abdominal tenderness (Suprapubic tenderness).   Musculoskeletal:         General: No swelling or tenderness. Normal range of motion.      Cervical back: Normal range of motion.   Skin:     General: Skin is warm.      Coloration: Skin is not jaundiced.      Findings: No bruising.      Comments: Bilateral LE erythema consistent with stasis dermatitis,  worse on the left leg, appears stable from prior.    Neurological:      Mental Status: He is alert. Mental status is at baseline. He is disoriented.   Psychiatric:         Attention and Perception: Attention normal.         Mood and Affect: Mood normal.         Speech: Speech normal.         Behavior: Behavior is uncooperative and slowed. Behavior is not agitated, aggressive or combative.         Cognition and Memory: Cognition is impaired. Memory is impaired.           Significant Labs: All pertinent labs within the past 24 hours have been reviewed.  Blood Culture: No results for input(s): LABBLOO in the last 48 hours.  BMP:   Recent Labs   Lab 06/14/22  1520   *   *   K 3.8      CO2 24   BUN 20   CREATININE 1.3   CALCIUM 9.2   MG 2.1     CBC:   Recent Labs    Lab 06/14/22  1520   WBC 16.80*   HGB 14.1   HCT 42.8        CMP:   Recent Labs   Lab 06/14/22  1520   *   K 3.8      CO2 24   *   BUN 20   CREATININE 1.3   CALCIUM 9.2   PROT 6.8   ALBUMIN 3.2*   BILITOT 0.8   ALKPHOS 98   AST 8*   ALT 14   ANIONGAP 10   EGFRNONAA 53.4*     Lactic Acid:   Recent Labs   Lab 06/14/22  1520   LACTATE 1.1       Significant Imaging: I have reviewed all pertinent imaging results/findings within the past 24 hours.  Imaging Results              CT Head Without Contrast (Final result)  Result time 06/14/22 14:49:20      Final result by Brandon Rey MD (06/14/22 14:49:20)                   Impression:      Examination mildly degraded by patient motion artifact.    No evidence of acute hemorrhage or major vascular distribution infarct.    Moderate cerebral volume loss.    Remote left cerebellar infarct.      Electronically signed by: Brandon Rey MD  Date:    06/14/2022  Time:    14:49               Narrative:    EXAMINATION:  CT HEAD WITHOUT CONTRAST    CLINICAL HISTORY:  Mental status change, unknown cause;    TECHNIQUE:  Low dose axial CT images obtained throughout the head without the use of intravenous contrast.  Axial, sagittal and coronal reconstructions were performed.    Examination mildly degraded by patient motion artifact.    COMPARISON:  3/7/22.    FINDINGS:  Intracranial compartment:    Prominence of the ventricles and sulci compatible with  cerebral volume loss. Configuration not suggestive of hydrocephalus.    The brain parenchyma appears unchanged.  Stable remote left cerebellar infarct..  No new parenchymal mass, hemorrhage, edema or major vascular distribution infarct.    No extra-axial blood or fluid collections.    Skull/extracranial contents (limited evaluation):    No displaced calvarial fracture.    The mastoid air cells and visualized paranasal sinuses are essentially clear.                                       X-Ray Chest AP  Portable (Final result)  Result time 06/14/22 14:55:22      Final result by Nura Waters MD (06/14/22 14:55:22)                   Impression:      See above      Electronically signed by: Nura Waters MD  Date:    06/14/2022  Time:    14:55               Narrative:    EXAMINATION:  XR CHEST AP PORTABLE    CLINICAL HISTORY:  Altered mental status;    TECHNIQUE:  Single frontal view of the chest was performed.    COMPARISON:  No 03/13/2022 ne    FINDINGS:  Cardiomegaly.  Calcified mediastinal lymph nodes.  Slight degree of diffuse accentuation interstitial markings and/or interstitial edema.  No significant airspace consolidation or pleural effusion identified

## 2022-06-15 NOTE — PROGRESS NOTES
Pharmacist Dose Adjustment Note    Jake Louie is a 76 y.o. male being treated with enoxaparin 40 mg every 24 hours for VTE ppx    Patient Data:    Vital Signs (Most Recent):  Temp: 98.2 °F (36.8 °C) (06/14/22 1852)  Pulse: 80 (06/14/22 1852)  Resp: 18 (06/14/22 1852)  BP: (!) 142/62 (06/14/22 1852)  SpO2: 95 % (06/14/22 1852)   Vital Signs (72h Range):  Temp:  [98 °F (36.7 °C)-98.2 °F (36.8 °C)]   Pulse:  [77-98]   Resp:  [18-20]   BP: (136-153)/()   SpO2:  [93 %-98 %]      Recent Labs   Lab 06/14/22  1520   CREATININE 1.3     Serum creatinine: 1.3 mg/dL 06/14/22 1520  Estimated creatinine clearance: 74.5 mL/min    Adjusted enoxaparin 40 mg every 12 hours   Appropriate dose based on indication: enoxaparin 40 mg every 12 hours, based on  Obesity (>150 kg or BMI>35 kg/m2) per Pharmacy anticoagulation protocol   Monitor: CrCl, Hgb, Hematocrit, Platelets     Pharmacist's Name: Kyle Massey  Pharmacist's Extension: [phone]

## 2022-06-15 NOTE — PLAN OF CARE
Spoke with spouse sha weeks ([phone]).  Pt hasn't been himself lately.  He doesn't want to walk to the bathroom, and he fell to the floor and wasn't able to get up by himself.  Per wife, wants him to go to SNF for rehab first then [facility] will [facility]  First choice is [facility], and will wait on pt/ot recommendations.  CM will continue to follow for further needs.     Ludmila garcia rn [facility] [phone]   No

## 2022-06-15 NOTE — PLAN OF CARE
[facility] - Med Surg  Initial Discharge Assessment       Primary Care Provider: Artem Hood MD    Admission Diagnosis: Altered mental status [R41.82]  Chest pain [R07.9]    Admission Date: 6/14/2022  Expected Discharge Date: 6/16/2022    Discharge Barriers Identified: Nursing Home rejection    Payor: HU[employer] MANAGED MEDICARE / Plan: HUMANA TOTAL CARE ADVANTAGE / Product Type: Medicare Advantage /     Extended Emergency Contact Information  Primary Emergency Contact: Sari Louie  Address: [address] [place] [address]           [place] [address] [place]  Mobile Phone: [phone]  Relation: Spouse    Discharge Plan A: Skilled Nursing Facility  Discharge Plan B: Skilled Nursing Facility      [id number] - [place] - [address] AT [facility] & [address]  [place] [address]  Phone: [phone] Fax: [phone]    [facility] (Now [facility]) - [facility] [address]  [address] [facility] [address]  Phone: [phone] Fax: [phone]    [facility] #[id number] - [place] CR[place] - [address]  AT [facility] & [address]  [address] [place] [address]  Phone: [phone] Fax: [phone]    CM was not able to contact anyone from his facesheet.  Currently  He is not alert aao*4.  He has a sitter at bedside.  Per H &P, has a nightly CPAP, wife is [facility] the [facility]. Will continue to follow.      Ludmila Santos RN [facility] [phone]  Initial Assessment (most recent)     Adult Discharge Assessment - 06/15/22 1303        Discharge Assessment    Assessment Type Discharge Planning Assessment     Confirmed/corrected address, phone number and insurance No     Reason No family to provide information/patient unable to answer     Confirmed Demographics Correct on Facesheet     Source of Information other (see comments)   no family  answered, pt is altered    Lives With child(juan), adult     Do you expect to return to your current living situation? No     Do you have help at home or someone to help you manage your care at home? Yes     Who are your caregiver(s) and their phone number(s)? sha weeks (spouse) [phone]     Prior to hospitilization cognitive status: Unable to Assess     Current cognitive status: Not Oriented to Person;Not Oriented to Place;Not Oriented to Time     Walking or Climbing Stairs Difficulty ambulation difficulty, requires equipment     Dressing/Bathing Difficulty bathing difficulty, requires equipment     Readmission within 30 days? No     Do you take prescription medications? Yes     Do you have prescription coverage? Yes     Coverage [employer] managed medicare     Who is going to help you get home at discharge? sha weeks (spouse) [phone]     How do you get to doctors appointments? family or friend will provide     Are you on dialysis? No     Do you take coumadin? No     Discharge Plan A Skilled Nursing Facility     Discharge Plan B Skilled Nursing Facility     Discharge Barriers Identified Nursing Home rejection        Relationship/Environment    Name(s) of Who Lives With Patient sha weeks (spouse) [phone]
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
## 2022-06-15 NOTE — PLAN OF CARE
Observed patient sleeping but easily to arouse. Family at bedside. No sign of distress. Respirations even and unlabored. Abdomen soft and nontender Decrease in appetite. MD aware. No other problems noted.

## 2022-06-15 NOTE — ASSESSMENT & PLAN NOTE
Jake Louie is a 76 year old male with severe alzheimer's dementia, HLD, HTN, JUICE on nightly CPAP, RLS , prediabetes, chronic venous insufficiency, AAA s/p EVAR who was brought in by his spouse for altered mental status.   CTH and CXR unremarkable.   Labs are largely unremarkable, besides leukocytosis. UA with 1+ leuks, WBC 12, rare bacteria. BNP, lactate, troponin wnl.  Suspect AMS could be related to behavioral disturbance in the setting of severe dementia vs UTI (though UA not convincing, but given urinary frequency, slight suprapubic tenderness and leukocytosis)    Plan  - Will treat for presumptive UTI with IV ceftriaxone  - F/u AM procalcitonin and TSH  - F/u with Urine and blood cultures  - Delirium precautions  - Appreciate  assistance with facility placement as spouse is currently working on [facility]

## 2022-06-15 NOTE — NURSING
Patient arrived to floor. VSS. Patient is  Disorient X4.  Bed in low position call light within reach, tele sitter order on waiting list. Wife @ bedside will continue to monitor.

## 2022-06-15 NOTE — ASSESSMENT & PLAN NOTE
Venous stasis of bilateral LE. LLE erythema appears stable.     - Continue home lasix.   - Wound care consulted.

## 2022-06-15 NOTE — ASSESSMENT & PLAN NOTE
- Continue home quetiapine (Qtc 438 on admit), donepezil, lamotrigine  - F/u daily ECG for QTc monitoring

## 2022-06-15 NOTE — ACP (ADVANCE CARE PLANNING)
Advance Care Planning     I engaged Mr. Jake Louie's wife and NOK, . Sari Louie, in a [facility] discussion. We discussed his clinical course this hospital admission as well as his clinical course at home prior to this admission. She has noticed a decline in his cognition over the past several years. He has a very limited ability to care for himself (can only feed and toilet himself) - all other activities require her assistance. She reports that he would not want to be resuscitated in the event of cardiac arrest - confirmed that he would not want CPR or intubation. She would not want to prolong suffering and would adopt a comfort focused approach should it be neccessary. He would like to be an organ donor if he is eligible. Advised her that we will change the code status to DNR. Ankita completed and placed in the chart.
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
## 2022-06-15 NOTE — PHARMACY MED REC
"Admission Medication History     The home medication history was taken by Sarah Orozco.    You may go to "Admission" then "Reconcile Home Medications" tabs to review and/or act upon these items.      The home medication list has been updated by the Pharmacy department.    Please read ALL comments highlighted in yellow.    Please address this information as you see fit.     Feel free to contact us if you have any questions or require assistance.      The medications listed below were removed from the home medication list. Please reorder if appropriate:  Patient reports no longer taking the following medication(s):   AZELASTINE 137 MCG NASAL SPRAY   HYDROCODONE-ACETAMINOPHEN 5-325MG   METOLAZONE 5MG    Medications listed below were obtained from: Patient/family  PTA Medications   Medication Sig    acetaminophen (TYLENOL) 500 MG tablet   Take 1,000 mg by mouth 2 (two) times a day.    albuterol (PROVENTIL/VENTOLIN HFA) 90 mcg/actuation inhaler   Inhale 2 puffs into the lungs every 6 (six) hours as needed for Wheezing.    ascorbic acid (VITAMIN C) 500 MG tablet   Take 500 mg by mouth once daily.    aspirin (ECOTRIN) 81 MG EC tablet   Take 81 mg by mouth once daily.    atorvastatin (LIPITOR) 10 MG tablet   TAKE 1 TABLET(10 MG) BY MOUTH EVERY DAY    donepeziL (ARICEPT) 10 MG tablet   TAKE 1 TABLET(10 MG) BY MOUTH EVERY EVENING    fluocinonide (LIDEX) 0.05 % external solution   Use at bedtime for scalp as needed    FLUoxetine 20 MG capsule     TAKE 1 CAPSULE(20 MG) BY MOUTH TWICE DAILY    furosemide (LASIX) 20 MG tablet   Take 1 tablet (20 mg total) by mouth once daily.    lamoTRIgine (LAMICTAL) 100 MG tablet   Take 1 tablet (100 mg total) by mouth 2 (two) times daily.    LIDOcaine (LIDODERM) 5 %       Place 1 patch onto the skin daily as needed (neck pain). Remove & Discard patch within 12 hours or as directed by MD    lisinopriL (PRINIVIL,ZESTRIL) 20 MG tablet   Take 1 tablet (20 mg total) by mouth 2 " (two) times daily.    memantine (NAMENDA) 10 MG Tab   Take 1 tablet (10 mg total) by mouth 2 (two) times a day.    mirtazapine (REMERON) 30 MG tablet   TAKE 1 TABLET(30 MG) BY MOUTH EVERY NIGHT    multivitamin (THERAGRAN) per tablet   Take 1 tablet by mouth once daily.    mupirocin (BACTROBAN) 2 % ointment   Apply topically 2 (two) times daily as needed.    QUEtiapine (SEROQUEL) 100 MG Tab   Take 100 mg by mouth once daily.    QUEtiapine (SEROQUEL) 200 MG Tab   Take 1 tablet (200 mg total) by mouth nightly.    sodium chloride (SALINE NASAL) 0.65 % nasal spray   1 spray by Nasal route daily as needed for Congestion.    tamsulosin (FLOMAX) 0.4 mg Cap   TAKE 1 CAPSULE(0.4 MG) BY MOUTH EVERY DAY    tiZANidine (ZANAFLEX) 4 MG tablet   Take 4 mg by mouth every 8 (eight) hours as needed (muscle spasms).    vitamin D 1000 units Tab Take 185 mg by mouth once daily.           Sarah Orozco  EXT [id number]                  .

## 2022-06-16 ENCOUNTER — PATIENT MESSAGE (OUTPATIENT)
Dept: ORTHOPEDICS | Facility: CLINIC | Age: 77
End: 2022-06-16
Payer: MEDICARE

## 2022-06-16 PROBLEM — F05 DELIRIUM SUPERIMPOSED ON DEMENTIA: Status: ACTIVE | Noted: 2022-06-16

## 2022-06-16 PROBLEM — N39.0 UTI (URINARY TRACT INFECTION): Status: ACTIVE | Noted: 2022-06-16

## 2022-06-16 LAB
ALBUMIN SERPL BCP-MCNC: 2.5 G/DL (ref 3.5–5.2)
ALP SERPL-CCNC: 86 U/L (ref 55–135)
ALT SERPL W/O P-5'-P-CCNC: 11 U/L (ref 10–44)
ANION GAP SERPL CALC-SCNC: 9 MMOL/L (ref 8–16)
AST SERPL-CCNC: 10 U/L (ref 10–40)
BASOPHILS # BLD AUTO: 0.04 K/UL (ref 0–0.2)
BASOPHILS NFR BLD: 0.5 % (ref 0–1.9)
BILIRUB SERPL-MCNC: 0.4 MG/DL (ref 0.1–1)
BUN SERPL-MCNC: 24 MG/DL (ref 8–23)
CALCIUM SERPL-MCNC: 8.9 MG/DL (ref 8.7–10.5)
CHLORIDE SERPL-SCNC: 106 MMOL/L (ref 95–110)
CO2 SERPL-SCNC: 26 MMOL/L (ref 23–29)
CREAT SERPL-MCNC: 1.1 MG/DL (ref 0.5–1.4)
DIFFERENTIAL METHOD: ABNORMAL
EOSINOPHIL # BLD AUTO: 0.2 K/UL (ref 0–0.5)
EOSINOPHIL NFR BLD: 2.2 % (ref 0–8)
ERYTHROCYTE [DISTWIDTH] IN BLOOD BY AUTOMATED COUNT: 15 % (ref 11.5–14.5)
EST. GFR  (AFRICAN AMERICAN): >60 ML/MIN/1.73 M^2
EST. GFR  (NON AFRICAN AMERICAN): >60 ML/MIN/1.73 M^2
GLUCOSE SERPL-MCNC: 94 MG/DL (ref 70–110)
HCT VFR BLD AUTO: 40 % (ref 40–54)
HGB BLD-MCNC: 12.7 G/DL (ref 14–18)
IMM GRANULOCYTES # BLD AUTO: 0.06 K/UL (ref 0–0.04)
IMM GRANULOCYTES NFR BLD AUTO: 0.8 % (ref 0–0.5)
LYMPHOCYTES # BLD AUTO: 1.3 K/UL (ref 1–4.8)
LYMPHOCYTES NFR BLD: 17.4 % (ref 18–48)
MAGNESIUM SERPL-MCNC: 2.4 MG/DL (ref 1.6–2.6)
MCH RBC QN AUTO: 28.9 PG (ref 27–31)
MCHC RBC AUTO-ENTMCNC: 31.8 G/DL (ref 32–36)
MCV RBC AUTO: 91 FL (ref 82–98)
MONOCYTES # BLD AUTO: 0.7 K/UL (ref 0.3–1)
MONOCYTES NFR BLD: 9.3 % (ref 4–15)
NEUTROPHILS # BLD AUTO: 5.4 K/UL (ref 1.8–7.7)
NEUTROPHILS NFR BLD: 69.8 % (ref 38–73)
NRBC BLD-RTO: 0 /100 WBC
PHOSPHATE SERPL-MCNC: 2.6 MG/DL (ref 2.7–4.5)
PLATELET # BLD AUTO: 205 K/UL (ref 150–450)
PMV BLD AUTO: 9.6 FL (ref 9.2–12.9)
POCT GLUCOSE: 102 MG/DL (ref 70–110)
POCT GLUCOSE: 102 MG/DL (ref 70–110)
POCT GLUCOSE: 165 MG/DL (ref 70–110)
POCT GLUCOSE: 95 MG/DL (ref 70–110)
POTASSIUM SERPL-SCNC: 3.8 MMOL/L (ref 3.5–5.1)
PROT SERPL-MCNC: 6 G/DL (ref 6–8.4)
RBC # BLD AUTO: 4.4 M/UL (ref 4.6–6.2)
SODIUM SERPL-SCNC: 141 MMOL/L (ref 136–145)
WBC # BLD AUTO: 7.72 K/UL (ref 3.9–12.7)

## 2022-06-16 PROCEDURE — 96366 THER/PROPH/DIAG IV INF ADDON: CPT | Performed by: EMERGENCY MEDICINE

## 2022-06-16 PROCEDURE — 63600175 PHARM REV CODE 636 W HCPCS: Performed by: STUDENT IN AN ORGANIZED HEALTH CARE EDUCATION/TRAINING PROGRAM

## 2022-06-16 PROCEDURE — 97530 THERAPEUTIC ACTIVITIES: CPT

## 2022-06-16 PROCEDURE — 85025 COMPLETE CBC W/AUTO DIFF WBC: CPT | Performed by: STUDENT IN AN ORGANIZED HEALTH CARE EDUCATION/TRAINING PROGRAM

## 2022-06-16 PROCEDURE — 97165 OT EVAL LOW COMPLEX 30 MIN: CPT

## 2022-06-16 PROCEDURE — 90792 PSYCH DIAG EVAL W/MED SRVCS: CPT | Mod: ,,, | Performed by: PHYSICIAN ASSISTANT

## 2022-06-16 PROCEDURE — 96372 THER/PROPH/DIAG INJ SC/IM: CPT | Performed by: STUDENT IN AN ORGANIZED HEALTH CARE EDUCATION/TRAINING PROGRAM

## 2022-06-16 PROCEDURE — 99226 PR SUBSEQUENT OBSERVATION CARE,LEVEL III: ICD-10-PCS | Mod: ,,, | Performed by: INTERNAL MEDICINE

## 2022-06-16 PROCEDURE — 27000221 HC OXYGEN, UP TO 24 HOURS

## 2022-06-16 PROCEDURE — 25000003 PHARM REV CODE 250: Performed by: STUDENT IN AN ORGANIZED HEALTH CARE EDUCATION/TRAINING PROGRAM

## 2022-06-16 PROCEDURE — 99226 PR SUBSEQUENT OBSERVATION CARE,LEVEL III: CPT | Mod: ,,, | Performed by: INTERNAL MEDICINE

## 2022-06-16 PROCEDURE — 80053 COMPREHEN METABOLIC PANEL: CPT | Performed by: STUDENT IN AN ORGANIZED HEALTH CARE EDUCATION/TRAINING PROGRAM

## 2022-06-16 PROCEDURE — S0166 INJ OLANZAPINE 2.5MG: HCPCS | Performed by: STUDENT IN AN ORGANIZED HEALTH CARE EDUCATION/TRAINING PROGRAM

## 2022-06-16 PROCEDURE — 84100 ASSAY OF PHOSPHORUS: CPT | Performed by: STUDENT IN AN ORGANIZED HEALTH CARE EDUCATION/TRAINING PROGRAM

## 2022-06-16 PROCEDURE — 97535 SELF CARE MNGMENT TRAINING: CPT

## 2022-06-16 PROCEDURE — 99900035 HC TECH TIME PER 15 MIN (STAT)

## 2022-06-16 PROCEDURE — 90792 PR PSYCHIATRIC DIAGNOSTIC EVALUATION W/MEDICAL SERVICES: ICD-10-PCS | Mod: ,,, | Performed by: PHYSICIAN ASSISTANT

## 2022-06-16 PROCEDURE — 94761 N-INVAS EAR/PLS OXIMETRY MLT: CPT

## 2022-06-16 PROCEDURE — 97162 PT EVAL MOD COMPLEX 30 MIN: CPT

## 2022-06-16 PROCEDURE — 36415 COLL VENOUS BLD VENIPUNCTURE: CPT | Performed by: STUDENT IN AN ORGANIZED HEALTH CARE EDUCATION/TRAINING PROGRAM

## 2022-06-16 PROCEDURE — 83735 ASSAY OF MAGNESIUM: CPT | Performed by: STUDENT IN AN ORGANIZED HEALTH CARE EDUCATION/TRAINING PROGRAM

## 2022-06-16 PROCEDURE — 63600175 PHARM REV CODE 636 W HCPCS: Performed by: INTERNAL MEDICINE

## 2022-06-16 PROCEDURE — G0378 HOSPITAL OBSERVATION PER HR: HCPCS

## 2022-06-16 RX ORDER — OLANZAPINE 10 MG/2ML
2.5 INJECTION, POWDER, FOR SOLUTION INTRAMUSCULAR ONCE AS NEEDED
Status: COMPLETED | OUTPATIENT
Start: 2022-06-16 | End: 2022-06-16

## 2022-06-16 RX ORDER — QUETIAPINE FUMARATE 25 MG/1
100 TABLET, FILM COATED ORAL DAILY
Status: DISCONTINUED | OUTPATIENT
Start: 2022-06-17 | End: 2022-06-20 | Stop reason: HOSPADM

## 2022-06-16 RX ORDER — OLANZAPINE 5 MG/1
5 TABLET, ORALLY DISINTEGRATING ORAL 2 TIMES DAILY PRN
Status: DISCONTINUED | OUTPATIENT
Start: 2022-06-16 | End: 2022-06-20 | Stop reason: HOSPADM

## 2022-06-16 RX ORDER — QUETIAPINE FUMARATE 25 MG/1
100 TABLET, FILM COATED ORAL ONCE
Status: COMPLETED | OUTPATIENT
Start: 2022-06-16 | End: 2022-06-16

## 2022-06-16 RX ADMIN — Medication 1000 UNITS: at 10:06

## 2022-06-16 RX ADMIN — QUETIAPINE FUMARATE 200 MG: 200 TABLET ORAL at 08:06

## 2022-06-16 RX ADMIN — ATORVASTATIN CALCIUM 10 MG: 10 TABLET, FILM COATED ORAL at 10:06

## 2022-06-16 RX ADMIN — LISINOPRIL 20 MG: 20 TABLET ORAL at 08:06

## 2022-06-16 RX ADMIN — MEMANTINE 10 MG: 10 TABLET ORAL at 10:06

## 2022-06-16 RX ADMIN — QUETIAPINE FUMARATE 100 MG: 25 TABLET ORAL at 10:06

## 2022-06-16 RX ADMIN — ASPIRIN 81 MG: 81 TABLET, COATED ORAL at 10:06

## 2022-06-16 RX ADMIN — TAMSULOSIN HYDROCHLORIDE 0.4 MG: 0.4 CAPSULE ORAL at 10:06

## 2022-06-16 RX ADMIN — MEMANTINE 10 MG: 10 TABLET ORAL at 08:06

## 2022-06-16 RX ADMIN — ENOXAPARIN SODIUM 40 MG: 100 INJECTION SUBCUTANEOUS at 08:06

## 2022-06-16 RX ADMIN — FUROSEMIDE 20 MG: 20 TABLET ORAL at 10:06

## 2022-06-16 RX ADMIN — DONEPEZIL HYDROCHLORIDE 10 MG: 10 TABLET ORAL at 08:06

## 2022-06-16 RX ADMIN — CEFTRIAXONE 2 G: 2 INJECTION, SOLUTION INTRAVENOUS at 08:06

## 2022-06-16 RX ADMIN — FLUOXETINE 20 MG: 20 CAPSULE ORAL at 08:06

## 2022-06-16 RX ADMIN — ENOXAPARIN SODIUM 40 MG: 100 INJECTION SUBCUTANEOUS at 10:06

## 2022-06-16 RX ADMIN — FLUOXETINE 20 MG: 20 CAPSULE ORAL at 10:06

## 2022-06-16 RX ADMIN — QUETIAPINE FUMARATE 100 MG: 25 TABLET ORAL at 05:06

## 2022-06-16 RX ADMIN — LAMOTRIGINE 100 MG: 25 TABLET ORAL at 08:06

## 2022-06-16 RX ADMIN — LAMOTRIGINE 100 MG: 25 TABLET ORAL at 10:06

## 2022-06-16 RX ADMIN — LISINOPRIL 20 MG: 20 TABLET ORAL at 10:06

## 2022-06-16 RX ADMIN — OXYCODONE HYDROCHLORIDE AND ACETAMINOPHEN 500 MG: 500 TABLET ORAL at 10:06

## 2022-06-16 RX ADMIN — OLANZAPINE 2.5 MG: 10 INJECTION, POWDER, FOR SOLUTION INTRAMUSCULAR at 05:06

## 2022-06-16 NOTE — HOSPITAL COURSE
Presented for altered mental status and refusal to eat. Labs and imaging were largely unrevealing. Likely 2/2 UTI vs behavioral disturbance in the setting of severe dementia. Started treatment for presumed UTI with ceftriaxone 1g IV q24hrs given presentation and leukocytosis. Continues to have intermittent agitation likely 2/2 delirium however this improved throughout the admission. Psych consulted with adjustment of antipsychotics. Working on placement with CM; placement will need to happen after discharge. UCx positive for pan-sensitive e.coli. PT/OT recommending home with rollator. Hospital bed ordered for home use. Medically stable, discharge pending delivery of hospital bed.

## 2022-06-16 NOTE — PLAN OF CARE
PT evaluation complete - see note for details. POC and goals established.    Problem: Physical Therapy  Goal: Physical Therapy Goal  Description: Goals to be met by: 22     Patient will increase functional independence with mobility by performin. Supine to sit with Contact Guard Assistance  2. Sit to stand transfer with Contact Guard Assistance  3. Bed to chair transfer with Contact Guard Assistance using LRAD  4. Gait  x 100 feet with Contact Guard Assistance using LRAD  5. Sitting at edge of bed x15 minutes with Contact Guard Assistance     Outcome: Ongoing, Progressing     2022

## 2022-06-16 NOTE — SUBJECTIVE & OBJECTIVE
Interval History: required zyprexa this morning for agitation. Waxing waning mental status. Please see hospital course above     Review of Systems   Unable to perform ROS: Mental status change   Objective:     Vital Signs (Most Recent):  Temp: 98.3 °F (36.8 °C) (06/16/22 1504)  Pulse: 80 (06/16/22 1504)  Resp: 20 (06/16/22 1504)  BP: 138/76 (06/16/22 1504)  SpO2: (!) 90 % (06/16/22 1504)   Vital Signs (24h Range):  Temp:  [97.7 °F (36.5 °C)-99.8 °F (37.7 °C)] 98.3 °F (36.8 °C)  Pulse:  [69-80] 80  Resp:  [18-20] 20  SpO2:  [90 %-94 %] 90 %  BP: (122-151)/(63-76) 138/76     Weight: (!) 158.7 kg (349 lb 15.7 oz)  Body mass index is 50.22 kg/m².    Intake/Output Summary (Last 24 hours) at 6/16/2022 1833  Last data filed at 6/16/2022 1656  Gross per 24 hour   Intake 540 ml   Output 600 ml   Net -60 ml      Physical Exam  Constitutional:       General: He is not in acute distress.     Appearance: He is obese. He is not ill-appearing, toxic-appearing or diaphoretic.      Comments: Alert and able to follow some commands. Non-cooperative with physical exam   HENT:      Head: Normocephalic and atraumatic.      Nose: Nose normal.      Mouth/Throat:      Mouth: Mucous membranes are moist.   Eyes:      Extraocular Movements: Extraocular movements intact.   Cardiovascular:      Rate and Rhythm: Normal rate and regular rhythm.      Pulses: Normal pulses.      Heart sounds: Normal heart sounds. No murmur heard.  Pulmonary:      Effort: Pulmonary effort is normal. No respiratory distress.      Breath sounds: Normal breath sounds. No wheezing or rales.   Abdominal:      General: Abdomen is flat. Bowel sounds are normal. There is no distension.      Palpations: Abdomen is soft.      Tenderness: There is no abdominal tenderness.   Musculoskeletal:         General: No swelling or tenderness. Normal range of motion.      Cervical back: Normal range of motion.   Skin:     General: Skin is warm.      Coloration: Skin is not jaundiced.       Findings: No bruising.      Comments: Bilateral LE erythema consistent with stasis dermatitis,  worse on the left leg, appears stable from prior.    Neurological:      Mental Status: He is alert. Mental status is at baseline.      Comments: Knows name and that he is at a hospital, speech normal. At baseline this AM    Psychiatric:         Behavior: Behavior is not agitated, aggressive or combative.       Significant Labs: All pertinent labs within the past 24 hours have been reviewed.  Blood Culture: No results for input(s): LABBLOO in the last 48 hours.  CBC:   Recent Labs   Lab 06/15/22  0758 06/16/22  0511   WBC 12.70 7.72   HGB 13.0* 12.7*   HCT 40.9 40.0    205     CMP:   Recent Labs   Lab 06/15/22  0758 06/16/22  0511    141   K 4.0 3.8    106   CO2 23 26    94   BUN 22 24*   CREATININE 1.1 1.1   CALCIUM 8.8 8.9   PROT 6.1 6.0   ALBUMIN 2.7* 2.5*   BILITOT 0.6 0.4   ALKPHOS 86 86   AST 9* 10   ALT 9* 11   ANIONGAP 12 9   EGFRNONAA >60.0 >60.0       Significant Imaging: I have reviewed all pertinent imaging results/findings within the past 24 hours.

## 2022-06-16 NOTE — PLAN OF CARE
Patient alert but very confused. Constantly trying to get out the bed. Therapy sat patient on side of the bed. Patient was not cooperating. Sitter at bedside. No problems noted.

## 2022-06-16 NOTE — PT/OT/SLP EVAL
Physical Therapy Co-Evaluation with OT and Treatment     Patient Name:  Jake Louie  MRN: [id number]    Admit Date: 6/14/2022  Admitting Diagnosis:  Encephalopathy  Length of Stay: 0 days  Recent Surgery: * No surgery found *      Recommendations:     Discharge Recommendations: Home, no PT needs anticipated (group home care)  Equipment recommendations: rolling walker  Barriers to discharge: Increased level of skilled assistance required and Fall risk     Assessment:     Jake Louie is a 77 y.o. male admitted to [facility] [place] on 6/14/2022 with medical diagnosis of Encephalopathy. Pt presents with weakness, impaired endurance, impaired self care skills, impaired functional mobilty, gait instability, impaired balance, impaired cognition, decreased safety awareness, impaired skin. These deficits effect their roles and responsibilities in which they were able to complete prior to admit. Pt is oriented x1 (name). Per wife, pt was ambulatory PTA, using standard walker outside the home and no device in the home for short t/f. Wife reports pt has been experiencing an increasing amount of falls over past few months. He is unable to get himself off the floor after falling. Pt has dementia and is not currently a candidate for additional therapy services after discharge. Treatment plan discussed with wife, who is in agreement. Jake Louie would benefit from acute PT intervention to improve quality of life, focus on recovery of impairments, provide patient/caregiver education, reduce fall risk, and maximize (I) and safety with functional mobility. Once medically stable, recommending pt discharge to Home, no PT needs anticipated (group home care 2/2 cognitive deficits and wife unable to provide level of assistance pt currently requires)    Rehab Prognosis: Fair    Plan:     During this hospitalization, patient to be seen 3 x/week to address the identified rehab impairments via gait training, therapeutic activities,  therapeutic exercises, neuromuscular re-education and progress towards stated goals.     Plan of Care Expires:  07/16/22  Plan of Care reviewed with: patient, spouse    This plan of care has been discussed with the patient/caregiver, who was included in its development and is in agreement with the identified goals and treatment plan.     Subjective     Communicated with RN prior to session.  Patient found supine upon PT entry to room, agreeable to evaluation. Pt's nsg and sitter present during session.    Chief Complaint: none stated      Patient/Family Comments/goals: none stated    Pain/Comfort:  · Pain Rating 1: 0/10    Patients cultural, spiritual, Anglican conflicts given the current situation: None identified     Patient History: information obtained from wife     Living Environment: Pt lives with wife in apartment . Bathroom set-up: tub/shower combo  Prior Level of Function: modified (I) for mobility and ADLs using standard walker as AD   DME owned: standard walker, single point cane, bedside commode and transfer tub bench  Support Available/Caregiver Assistance: wife has been assisting with ADLs (bathing, dressing, eating occasionally) but is unable to provide full level of assist pt currently requires 2/2 dementia; wife is agreeable to behavioral placement to ensure pt safety    Objective:   OT present for coeval due to pt's multiple medical comorbidities and functional/cognition deficits requiring two skilled therapists to appropriately progress pt's musculoskeletal strength, neuromuscular control, and endurance while taking into consideration medical acuity and pt safety.    Patient found with: Condom Catheter    Recent Surgery: * No surgery found *    General Precautions: Standard, fall   Orthopedic Precautions:N/A   Braces: N/A   Oxygen Device: room air      Exams:     Cognition:  · Oriented X 1 (name)  · Command following: Easily distracted and Follows one-step verbal commands  · Communication:  expressive and receptive language deficits  · [place]     Sensation:   o Light touch sensation: Unable to formally assess due to impaired cognition      Gross Motor Coordination: No deficits noted during functional mobility tasks      Edema/Skin Integrity: None noted; BLE erythema     Postural examination/scapula alignment: Rounded shoulder and Head forward     Lower Extremity Range of Motion:  o Right Lower Extremity: WFL  o Left Lower Extremity: WFL     Lower Extremity Strength:  o Right Lower Extremity: WFL  o Left Lower Extremity: WFL    Functional Mobility:    Bed Mobility:  · Supine > Sit with maximal assistance  · Lack of initiation from pt  · Sit > Supine with maximal assistance  · Lack of controlled descent into supine    Transfers:   · Sit <> Stand Transfer: Maximum Assistancex2 x 1 trials from eob with no AD   · R knee buckled during standing trial  · Pt returned back to eob after 5 seconds 2/2 safety concerns               Gait:  · Distance: deferred 2/2 safety concerns    Balance:  · Dynamic Sitting: GOOD-: Incosistently Maintains balance through MODERATE excursions of active trunk movement,     · Standing:  · Static: 0: Needs MAXIMAL assist to maintain    · Dynamic: 0: N/A    Outcome Measure: AM-PAC 6 CLICK MOBILITY  Total Score:12     Patient/Caregiver Education:     Therapist educated pt/caregiver regarding:    PT POC and goals for therapy    Safety with mobility and fall risk    Instruction on use of call button and importance of calling nursing staff for assistance with mobility    Time provided for therapeutic counseling and discussion of current health disposition. All questions answered to satisfaction, within scope of PT practice     Patient/caregiver able to verbalize understanding and expressed no further questions this visit; will follow-up with pt/caregiver during current admit for additional questions/concerns within scope of practice.     Called wife after session to obtain history  and PLOF. Wife is active in plan of care and in agreement with current treatment goals with realistic expectations.    White board updated.     Patient left supine with bed alarm on and sitter present.    GOALS:   Multidisciplinary Problems     Physical Therapy Goals        Problem: Physical Therapy    Goal Priority Disciplines Outcome Goal Variances Interventions   Physical Therapy Goal     PT, PT/OT Ongoing, Progressing     Description: Goals to be met by: 22     Patient will increase functional independence with mobility by performin. Supine to sit with Contact Guard Assistance  2. Sit to stand transfer with Contact Guard Assistance  3. Bed to chair transfer with Contact Guard Assistance using LRAD  4. Gait  x 100 feet with Contact Guard Assistance using LRAD  5. Sitting at edge of bed x15 minutes with Contact Guard Assistance                        History:     Past Medical History:   Diagnosis Date    Alzheimer's dementia     Depression     HEARING LOSS     Hyperlipidemia     Hypertension     JUICE (obstructive sleep apnea)     Personal history of colonic polyps     Prediabetes     Restless legs syndrome (RLS)     Sleep apnea        Past Surgical History:   Procedure Laterality Date    aaa stent      KNEE SURGERY      KNEE SURGERY      leg fx  repair       Time Tracking:     PT Received On: 22  PT Start Time: 1014     PT Stop Time: 1033  PT Total Time (min): 19 min     Billable Minutes: Evaluation 9 and Therapeutic Activity 10    2022

## 2022-06-16 NOTE — PLAN OF CARE
Problem: Occupational Therapy  Goal: Occupational Therapy Goal  Description: Goals to be met by: 6/30/22     Patient will increase functional independence with ADLs by performing:    UE Dressing with Moderate Assistance.  LE Dressing with Moderate Assistance.  Grooming while seated with Minimal Assistance.  Toileting from bedside commode with Moderate Assistance for hygiene and clothing management.   Bathing from  edge of bed with Moderate Assistance.  Toilet transfer to bedside commode with Minimal Assistance.  Increased functional strength to WFL for B UE.  Upper extremity exercise program x15 reps per handout, with assistance as needed.  Pt will be Ox4.  Pt will follow simple 1 step commands c 100% accuracy.    Outcome: Ongoing, Progressing

## 2022-06-16 NOTE — PLAN OF CARE
Patient is agitating, yelling and trying to get out of bed. Call made to   medical team 5 new order for Zyprexa given. Sitter  @ bedside for safety. Bed in low position call  light within reach will continue to monitor.

## 2022-06-16 NOTE — PT/OT/SLP EVAL
Occupational Therapy   Co-Evaluation    Name: Jake Louie  MRN: [id number]  Admitting Diagnosis:  Encephalopathy  Recent Surgery: * No surgery found *      Recommendations:     Discharge Recommendations: home  Discharge Equipment Recommendations:  walker, rolling  Barriers to discharge:  None    Assessment:     Jake oLuie is a 77 y.o. male with a medical diagnosis of Encephalopathy.  He presents with encephalopathy.  Pt was confused throughout assessment and was Ox1- name only.  Able to perform supine/sit T/F c max A and sit/stand T/F c max A x2.  Pt's knees buckled when in standing and was unable to take steps.  Able to perform grooming task c max A. Performance deficits affecting function: weakness, impaired endurance, impaired self care skills, impaired functional mobilty, impaired balance.      Rehab Prognosis: Good; patient would benefit from acute skilled OT services to address these deficits and reach maximum level of function.       Plan:     Patient to be seen 3 x/week to address the above listed problems via self-care/home management, therapeutic activities, therapeutic exercises  · Plan of Care Expires: 06/30/22  · Plan of Care Reviewed with: patient   · Co-tx d/t pt medical complexity, decreased endurance, and to insure pt safety.    Subjective     Chief Complaint: Encephalopathy  Patient/Family Comments/goals: To get better.    Occupational Profile:  Living Environment: Pt lives in a one story house c no REBEKAH.  Previous level of function: Pt was receiving assistance c ADL's which varies depending on pt's cognitive status.  Equipment Used at Home:  bath bench, bedside commode, walker, standard, cane, straight  Assistance upon Discharge: Pt lives c his wife.    Pain/Comfort:  · Pain Rating 1: 0/10    Patients cultural, spiritual, Advent conflicts given the current situation: no    Objective:     Communicated with: RN prior to session.  Patient found supine with Condom Catheter upon OT entry  to room.    General Precautions: Standard, fall   Orthopedic Precautions:N/A   Braces: N/A  Respiratory Status: Room air    Occupational Performance:    Bed Mobility:    · Patient completed Supine to Sit with maximal assistance  · Patient completed Sit to Supine with maximal assistance and 2 persons    Functional Mobility/Transfers:  · Patient completed Sit <> Stand Transfer with maximal assistance and of 2 persons  with  hand-held assist   · Functional Mobility: Pt has poor static standing balance and was unable to take steps d/t B knees buckling.    Activities of Daily Living:  · Grooming: maximal assistance to comb hair while sitting EOB.  · Upper Body Dressing: total assistance to don hospital gown.    Cognitive/Visual Perceptual:  Cognitive/Psychosocial Skills:     -       Oriented to: Person   -       Follows Commands/attention:Inattentive and Easily distracted  -       Communication: clear/fluent  -       Memory: Impaired STM, Impaired LTM and Poor immediate recall  -       Safety awareness/insight to disability: impaired   -       Mood/Affect/Coping skills/emotional control: Appropriate to situation    Physical Exam:  Upper Extremity Range of Motion:     -       Right Upper Extremity: WFL  -       Left Upper Extremity: WFL  Upper Extremity Strength:    -       Right Upper Extremity: WFL  -       Left Upper Extremity: WFL    AMPAC 6 Click ADL:  AMPAC Total Score: 8  Education:    Patient left supine with all lines intact, call button in reach and RN notified    GOALS:   Multidisciplinary Problems     Occupational Therapy Goals        Problem: Occupational Therapy    Goal Priority Disciplines Outcome Interventions   Occupational Therapy Goal     OT, PT/OT Ongoing, Progressing    Description: Goals to be met by: 6/30/22     Patient will increase functional independence with ADLs by performing:    UE Dressing with Moderate Assistance.  LE Dressing with Moderate Assistance.  Grooming while seated with Minimal  Assistance.  Toileting from bedside commode with Moderate Assistance for hygiene and clothing management.   Bathing from  edge of bed with Moderate Assistance.  Toilet transfer to bedside commode with Minimal Assistance.  Increased functional strength to WFL for B UE.  Upper extremity exercise program x15 reps per handout, with assistance as needed.  Pt will be Ox4.  Pt will follow simple 1 step commands c 100% accuracy.                     History:     Past Medical History:   Diagnosis Date    Alzheimer's dementia     Depression     HEARING LOSS     Hyperlipidemia     Hypertension     JUICE (obstructive sleep apnea)     Personal history of colonic polyps     Prediabetes     Restless legs syndrome (RLS)     Sleep apnea        Past Surgical History:   Procedure Laterality Date    aaa stent      KNEE SURGERY      KNEE SURGERY      leg fx  repair       Time Tracking:     OT Date of Treatment: 06/16/22  OT Start Time: 1014  OT Stop Time: 1033  OT Total Time (min): 19 min    Billable Minutes:Evaluation 9  Self Care/Home Management 10    6/16/2022
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
## 2022-06-16 NOTE — SUBJECTIVE & OBJECTIVE
Patient History               Medical as of 6/16/2022       Past Medical History       Diagnosis Date Comments Source    Alzheimer's dementia -- -- Provider    Depression -- -- Provider    HEARING LOSS -- -- Provider    Hyperlipidemia -- -- Provider    Hypertension -- -- Provider    JUICE (obstructive sleep apnea) -- -- Provider    Personal history of colonic polyps -- -- Provider    Prediabetes -- -- Provider    Restless legs syndrome (RLS) -- -- Provider    Sleep apnea -- -- Provider              Pertinent Negatives       Diagnosis Date Noted Comments Source    Basal cell carcinoma 10/11/2017 -- Provider    Melanoma 10/11/2017 -- Provider    Squamous cell carcinoma 10/11/2017 -- Provider                          Surgical as of 6/16/2022       Past Surgical History       Procedure Laterality Date Comments Source    leg fx [Other] -- repair -- Provider    KNEE SURGERY -- -- -- Provider    KNEE SURGERY -- -- -- Provider    aaa stent [Other] -- -- -- Provider                          Family as of 6/16/2022       Problem Relation Name Age of Onset Comments Source    Arthritis Mother -- -- -- Provider    Hypertension Mother -- -- -- Provider    Hearing loss Mother -- -- -- Provider    Pneumonia Mother -- -- -- Provider    Parkinsonism Father -- -- -- Provider    Hypertension Brother -- -- -- Provider    Colon cancer Brother -- -- -- Provider    Hypertension Brother -- -- -- Provider    Hypertension Brother -- -- -- Provider                  Tobacco Use as of 6/16/2022       Smoking Status Smoking Start Date Smoking Quit Date Packs/Day Years Used    Current Every Day Smoker -- -- 0.50 50.00      Types Comments Smokeless Tobacco Status Smokeless Tobacco Quit Date Source    -- -- Never Used -- Provider                  Alcohol Use as of 6/16/2022       Alcohol Use Drinks/Week Alcohol/Week Comments Source    No   -- -- Provider                  Drug Use as of 6/16/2022       Drug Use Types Frequency Comments Source    No  -- -- -- Provider                  Sexual Activity as of 6/16/2022       Sexually Active Birth Control Partners Comments Source    Not Asked -- -- -- Provider                  Activities of Daily Living as of 6/16/2022    None               Social Documentation as of 6/16/2022    None               Occupational as of 6/16/2022       Occupation Employer Comments Source    -- OTHER -- Provider                  Socioeconomic as of 6/16/2022       Marital Status Spouse Name Number of Children Years Education Education Level Preferred Language Ethnicity Race Source     -- -- -- -- English Not  or /a White Provider                  Pertinent History       Question Response Comments    Lives with -- --    Place in Birth Order -- --    Lives in -- --    Number of Siblings -- --    Raised by -- --    Legal Involvement -- --    Childhood Trauma -- --    Criminal History of -- --    Financial Status -- --    Highest Level of Education -- --    Does patient have access to a firearm? -- --     Service -- --    Primary Leisure Activity -- --    Spirituality -- --          Past Medical History:   Diagnosis Date    Alzheimer's dementia     Depression     HEARING LOSS     Hyperlipidemia     Hypertension     JUICE (obstructive sleep apnea)     Personal history of colonic polyps     Prediabetes     Restless legs syndrome (RLS)     Sleep apnea      Past Surgical History:   Procedure Laterality Date    aaa stent      KNEE SURGERY      KNEE SURGERY      leg fx  repair     Family History       Problem Relation (Age of Onset)    Arthritis Mother    Colon cancer Brother    Hearing loss Mother    Hypertension Mother, Brother, Brother, Brother    Parkinsonism Father    Pneumonia Mother          Tobacco Use    Smoking status: Current Every Day Smoker     Packs/day: 0.50     Years: 50.00     Pack years: 25.00    Smokeless tobacco: Never Used   Substance and Sexual Activity    Alcohol use: No    Drug use: No    Sexual  activity: Not on file     Review of patient's allergies indicates:  No Known Allergies    No current facility-administered medications on file prior to encounter.     Current Outpatient Medications on File Prior to Encounter   Medication Sig    acetaminophen (TYLENOL) 500 MG tablet Take 1,000 mg by mouth 2 (two) times a day.    albuterol (PROVENTIL/VENTOLIN HFA) 90 mcg/actuation inhaler Inhale 2 puffs into the lungs every 6 (six) hours as needed for Wheezing.    ascorbic acid (VITAMIN C) 500 MG tablet Take 500 mg by mouth once daily.    aspirin (ECOTRIN) 81 MG EC tablet Take 81 mg by mouth once daily.    atorvastatin (LIPITOR) 10 MG tablet TAKE 1 TABLET(10 MG) BY MOUTH EVERY DAY    donepeziL (ARICEPT) 10 MG tablet TAKE 1 TABLET(10 MG) BY MOUTH EVERY EVENING    fluocinonide (LIDEX) 0.05 % external solution Use hs for scalp (Patient taking differently: Use at bedtime for scalp as needed)    FLUoxetine 20 MG capsule TAKE 1 CAPSULE(20 MG) BY MOUTH TWICE DAILY    furosemide (LASIX) 20 MG tablet Take 1 tablet (20 mg total) by mouth once daily.    lamoTRIgine (LAMICTAL) 100 MG tablet Take 1 tablet (100 mg total) by mouth 2 (two) times daily.    LIDOcaine (LIDODERM) 5 % Place 1 patch onto the skin daily as needed (neck pain). Remove & Discard patch within 12 hours or as directed by MD    lisinopriL (PRINIVIL,ZESTRIL) 20 MG tablet Take 1 tablet (20 mg total) by mouth 2 (two) times daily.    memantine (NAMENDA) 10 MG Tab Take 1 tablet (10 mg total) by mouth 2 (two) times a day.    mirtazapine (REMERON) 30 MG tablet TAKE 1 TABLET(30 MG) BY MOUTH EVERY NIGHT    multivitamin (THERAGRAN) per tablet Take 1 tablet by mouth once daily.    mupirocin (BACTROBAN) 2 % ointment Apply topically 2 (two) times daily. (Patient taking differently: Apply topically 2 (two) times daily as needed.)    QUEtiapine (SEROQUEL) 100 MG Tab TAKE 1 TABLET BY MOUTH EVERY DAY AT 9 AM AND AT 3 PM (Patient taking differently: Take 100 mg by mouth once  daily.)    QUEtiapine (SEROQUEL) 200 MG Tab Take 1 tablet (200 mg total) by mouth nightly.    sodium chloride (SALINE NASAL) 0.65 % nasal spray 1 spray by Nasal route daily as needed for Congestion.    tamsulosin (FLOMAX) 0.4 mg Cap TAKE 1 CAPSULE(0.4 MG) BY MOUTH EVERY DAY    tiZANidine (ZANAFLEX) 4 MG tablet Take 1 tablet (4 mg total) by mouth every 8 (eight) hours as needed. (Patient taking differently: Take 4 mg by mouth every 8 (eight) hours as needed (muscle spasms).)    vitamin D 1000 units Tab Take 185 mg by mouth once daily.     Psychotherapeutics (From admission, onward)                Start     Stop Route Frequency Ordered    06/15/22 2100  QUEtiapine tablet 200 mg         -- Oral Nightly 06/1945    06/15/22 0900  QUEtiapine tablet 100 mg         -- Oral Daily 06/1945 06/14/22 2100  FLUoxetine capsule 20 mg         -- Oral 2 times daily 06/1945 06/14/22 2100  mirtazapine tablet 30 mg         -- Oral Nightly 06/1945          Review of Systems   Constitutional:  Negative for fever.   HENT:  Positive for hearing loss. Negative for nosebleeds.    Eyes:  Negative for redness.   Respiratory:  Negative for cough.    Skin:  Positive for color change (lower leg due to recent infection).   Neurological:  Negative for facial asymmetry.   Psychiatric/Behavioral:  Positive for agitation, behavioral problems and confusion.    Strengths and Liabilities: Strength: Patient has positive support network., Liability: Patient is dependent., Liability: Patient is hostile., Liability: Patient has poor health., Liability: Patient is unstable., Liability: Patient has possible cognitive impairment., Liability: Patient lacks coping skills.    Objective:     Vital Signs (Most Recent):  Temp: 98.3 °F (36.8 °C) (06/16/22 1504)  Pulse: 80 (06/16/22 1504)  Resp: 20 (06/16/22 1504)  BP: 138/76 (06/16/22 1504)  SpO2: (!) 90 % (06/16/22 1504)   Vital Signs (24h Range):  Temp:  [97.7 °F (36.5 °C)-99.8 °F (37.7  "°C)] 98.3 °F (36.8 °C)  Pulse:  [69-80] 80  Resp:  [18-20] 20  SpO2:  [90 %-94 %] 90 %  BP: (122-151)/(63-76) 138/76     Height: 5' 10" (177.8 cm)  Weight: (!) 158.7 kg (349 lb 15.7 oz)  Body mass index is 50.22 kg/m².      Intake/Output Summary (Last 24 hours) at 6/16/2022 1601  Last data filed at 6/16/2022 1400  Gross per 24 hour   Intake 582 ml   Output 600 ml   Net -18 ml       Physical Exam     Significant Labs: All pertinent labs within the past 24 hours have been reviewed.    Significant Imaging: I have reviewed all pertinent imaging results/findings within the past 24 hours.  "
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
## 2022-06-16 NOTE — ASSESSMENT & PLAN NOTE
Patient is a Jake Louie is a 76 year old male with severe alzheimer's dementia, Bipolar disorder, HLD, HTN, JUICE on nightly CPAP, RLS , prediabetes, chronic venous insufficiency, AAA s/p EVAR who was brought in by his spouse for altered mental status.     Patient presents with symptoms of delirium superimposed on dementia. Patients orientation appears to wax and wane throughout the day. During interview, patient was irritable and agitated, refusing to participate. Patient refused to answer name, place, year, or other orientation questions, getting more agitated with each question. Per nursing, patient has bee confused and trying to get out of bed. Unable to reach patient's wife for collateral at this time. Collaboration with primary team reinforces delirium on top of dementia.     Recommendations:  1. Hold mirtazapine 30 mg at this time.     2. Start scheduled quetiapine 100 mg at 15:00 in addition to quetiapine 100 mg in morning and 200 mg qhs, in an effort to prevent evening agitation    3. Olanzapine 5 mg PRN for agitation    · DELIRIUM BEHAVIOR MANAGEMENT  · PLEASE utilize CHEMICAL restraints with PRN meds first for agitation. Minimize use of PHYSICAL restraints OR have periods of being out of physical restraints if possible.  · Keep window shades open and room lit during day and room dim at night in order to promote normal sleep-wake cycles  · Encourage family at bedside. [place] patient often to situation, location, date.  · Continue to Limit or Discontinue use of Narcotics, Benzos and Anti-cholinergic medications as they may worsen delirium.  · Continue medical workup for causative etiology of Delirium.      Case discussed with Dr. Connor Loo, psychiatrist

## 2022-06-16 NOTE — ASSESSMENT & PLAN NOTE
Jake Louie is a 76 year old male with severe alzheimer's dementia, HLD, HTN, JUICE on nightly CPAP, RLS , prediabetes, chronic venous insufficiency, AAA s/p EVAR who was brought in by his spouse for altered mental status.   CTH and CXR unremarkable.   Labs are largely unremarkable, besides leukocytosis. UA with 1+ leuks, WBC 12, rare bacteria. BNP, lactate, troponin wnl.  Suspect AMS could be related to behavioral disturbance in the setting of severe dementia vs UTI (though UA not convincing, but given urinary frequency, slight suprapubic tenderness and leukocytosis)      -Likely 2/2 behavioral disturbance in the setting of severe dementia vs UTI.Continues to have intermittent agitation likely 2/2 delirium. Psych consulted with adjustment of antipsychotics. Scheduled Seroquel TID, zyprexa prn   -- cont UTI treatment   - Appreciate SW assistance with facility placement as spouse is currently working [facility]

## 2022-06-17 LAB
ALBUMIN SERPL BCP-MCNC: 2.6 G/DL (ref 3.5–5.2)
ALP SERPL-CCNC: 85 U/L (ref 55–135)
ALT SERPL W/O P-5'-P-CCNC: 17 U/L (ref 10–44)
ANION GAP SERPL CALC-SCNC: 12 MMOL/L (ref 8–16)
AST SERPL-CCNC: 16 U/L (ref 10–40)
BACTERIA UR CULT: ABNORMAL
BASOPHILS # BLD AUTO: 0.03 K/UL (ref 0–0.2)
BASOPHILS NFR BLD: 0.5 % (ref 0–1.9)
BILIRUB SERPL-MCNC: 0.3 MG/DL (ref 0.1–1)
BUN SERPL-MCNC: 21 MG/DL (ref 8–23)
CALCIUM SERPL-MCNC: 8.7 MG/DL (ref 8.7–10.5)
CHLORIDE SERPL-SCNC: 107 MMOL/L (ref 95–110)
CO2 SERPL-SCNC: 22 MMOL/L (ref 23–29)
CREAT SERPL-MCNC: 1 MG/DL (ref 0.5–1.4)
DIFFERENTIAL METHOD: ABNORMAL
EOSINOPHIL # BLD AUTO: 0.2 K/UL (ref 0–0.5)
EOSINOPHIL NFR BLD: 2.5 % (ref 0–8)
ERYTHROCYTE [DISTWIDTH] IN BLOOD BY AUTOMATED COUNT: 15.2 % (ref 11.5–14.5)
EST. GFR  (AFRICAN AMERICAN): >60 ML/MIN/1.73 M^2
EST. GFR  (NON AFRICAN AMERICAN): >60 ML/MIN/1.73 M^2
GLUCOSE SERPL-MCNC: 115 MG/DL (ref 70–110)
HCT VFR BLD AUTO: 41.7 % (ref 40–54)
HGB BLD-MCNC: 13.3 G/DL (ref 14–18)
IMM GRANULOCYTES # BLD AUTO: 0.01 K/UL (ref 0–0.04)
IMM GRANULOCYTES NFR BLD AUTO: 0.2 % (ref 0–0.5)
LYMPHOCYTES # BLD AUTO: 1.4 K/UL (ref 1–4.8)
LYMPHOCYTES NFR BLD: 21.5 % (ref 18–48)
MAGNESIUM SERPL-MCNC: 2.2 MG/DL (ref 1.6–2.6)
MCH RBC QN AUTO: 28.4 PG (ref 27–31)
MCHC RBC AUTO-ENTMCNC: 31.9 G/DL (ref 32–36)
MCV RBC AUTO: 89 FL (ref 82–98)
MONOCYTES # BLD AUTO: 0.8 K/UL (ref 0.3–1)
MONOCYTES NFR BLD: 12.3 % (ref 4–15)
NEUTROPHILS # BLD AUTO: 4 K/UL (ref 1.8–7.7)
NEUTROPHILS NFR BLD: 63 % (ref 38–73)
NRBC BLD-RTO: 0 /100 WBC
PHOSPHATE SERPL-MCNC: 3.6 MG/DL (ref 2.7–4.5)
PLATELET # BLD AUTO: 201 K/UL (ref 150–450)
PMV BLD AUTO: 9.6 FL (ref 9.2–12.9)
POCT GLUCOSE: 115 MG/DL (ref 70–110)
POCT GLUCOSE: 117 MG/DL (ref 70–110)
POCT GLUCOSE: 123 MG/DL (ref 70–110)
POTASSIUM SERPL-SCNC: 3.7 MMOL/L (ref 3.5–5.1)
PROT SERPL-MCNC: 6.1 G/DL (ref 6–8.4)
RBC # BLD AUTO: 4.68 M/UL (ref 4.6–6.2)
SODIUM SERPL-SCNC: 141 MMOL/L (ref 136–145)
WBC # BLD AUTO: 6.41 K/UL (ref 3.9–12.7)

## 2022-06-17 PROCEDURE — 25000003 PHARM REV CODE 250: Performed by: STUDENT IN AN ORGANIZED HEALTH CARE EDUCATION/TRAINING PROGRAM

## 2022-06-17 PROCEDURE — 99233 SBSQ HOSP IP/OBS HIGH 50: CPT | Mod: ,,, | Performed by: INTERNAL MEDICINE

## 2022-06-17 PROCEDURE — 93010 EKG 12-LEAD: ICD-10-PCS | Mod: ,,, | Performed by: INTERNAL MEDICINE

## 2022-06-17 PROCEDURE — 36415 COLL VENOUS BLD VENIPUNCTURE: CPT | Performed by: STUDENT IN AN ORGANIZED HEALTH CARE EDUCATION/TRAINING PROGRAM

## 2022-06-17 PROCEDURE — 25000003 PHARM REV CODE 250: Performed by: INTERNAL MEDICINE

## 2022-06-17 PROCEDURE — 99900035 HC TECH TIME PER 15 MIN (STAT)

## 2022-06-17 PROCEDURE — 94660 CPAP INITIATION&MGMT: CPT

## 2022-06-17 PROCEDURE — 94761 N-INVAS EAR/PLS OXIMETRY MLT: CPT

## 2022-06-17 PROCEDURE — 1111F PR DISCHARGE MEDS RECONCILED W/ CURRENT OUTPATIENT MED LIST: ICD-10-PCS | Mod: CPTII,,, | Performed by: INTERNAL MEDICINE

## 2022-06-17 PROCEDURE — 96372 THER/PROPH/DIAG INJ SC/IM: CPT | Performed by: STUDENT IN AN ORGANIZED HEALTH CARE EDUCATION/TRAINING PROGRAM

## 2022-06-17 PROCEDURE — 83735 ASSAY OF MAGNESIUM: CPT | Performed by: STUDENT IN AN ORGANIZED HEALTH CARE EDUCATION/TRAINING PROGRAM

## 2022-06-17 PROCEDURE — 93010 ELECTROCARDIOGRAM REPORT: CPT | Mod: ,,, | Performed by: INTERNAL MEDICINE

## 2022-06-17 PROCEDURE — 80053 COMPREHEN METABOLIC PANEL: CPT | Performed by: STUDENT IN AN ORGANIZED HEALTH CARE EDUCATION/TRAINING PROGRAM

## 2022-06-17 PROCEDURE — 1111F DSCHRG MED/CURRENT MED MERGE: CPT | Mod: CPTII,,, | Performed by: INTERNAL MEDICINE

## 2022-06-17 PROCEDURE — 11000001 HC ACUTE MED/SURG PRIVATE ROOM

## 2022-06-17 PROCEDURE — 63600175 PHARM REV CODE 636 W HCPCS: Performed by: STUDENT IN AN ORGANIZED HEALTH CARE EDUCATION/TRAINING PROGRAM

## 2022-06-17 PROCEDURE — 84100 ASSAY OF PHOSPHORUS: CPT | Performed by: STUDENT IN AN ORGANIZED HEALTH CARE EDUCATION/TRAINING PROGRAM

## 2022-06-17 PROCEDURE — 99233 PR SUBSEQUENT HOSPITAL CARE,LEVL III: ICD-10-PCS | Mod: ,,, | Performed by: INTERNAL MEDICINE

## 2022-06-17 PROCEDURE — 85025 COMPLETE CBC W/AUTO DIFF WBC: CPT | Performed by: STUDENT IN AN ORGANIZED HEALTH CARE EDUCATION/TRAINING PROGRAM

## 2022-06-17 PROCEDURE — 93005 ELECTROCARDIOGRAM TRACING: CPT

## 2022-06-17 RX ORDER — CEPHALEXIN 500 MG/1
500 CAPSULE ORAL EVERY 8 HOURS
Status: DISCONTINUED | OUTPATIENT
Start: 2022-06-17 | End: 2022-06-20 | Stop reason: HOSPADM

## 2022-06-17 RX ORDER — CEPHALEXIN 500 MG/1
500 CAPSULE ORAL 4 TIMES DAILY
Qty: 16 CAPSULE | Refills: 0 | Status: SHIPPED | OUTPATIENT
Start: 2022-06-17 | End: 2022-06-17 | Stop reason: HOSPADM

## 2022-06-17 RX ORDER — CHOLECALCIFEROL (VITAMIN D3) 25 MCG
1000 TABLET ORAL DAILY
Qty: 30 TABLET | Refills: 0 | Status: SHIPPED | OUTPATIENT
Start: 2022-06-18

## 2022-06-17 RX ORDER — CEPHALEXIN 500 MG/1
500 CAPSULE ORAL EVERY 8 HOURS
Qty: 12 CAPSULE | Refills: 0 | Status: SHIPPED | OUTPATIENT
Start: 2022-06-17 | End: 2022-06-19 | Stop reason: SDUPTHER

## 2022-06-17 RX ADMIN — LISINOPRIL 20 MG: 20 TABLET ORAL at 08:06

## 2022-06-17 RX ADMIN — QUETIAPINE FUMARATE 100 MG: 25 TABLET ORAL at 03:06

## 2022-06-17 RX ADMIN — FLUOXETINE 20 MG: 20 CAPSULE ORAL at 08:06

## 2022-06-17 RX ADMIN — Medication 1000 UNITS: at 09:06

## 2022-06-17 RX ADMIN — LISINOPRIL 20 MG: 20 TABLET ORAL at 09:06

## 2022-06-17 RX ADMIN — ATORVASTATIN CALCIUM 10 MG: 10 TABLET, FILM COATED ORAL at 09:06

## 2022-06-17 RX ADMIN — MEMANTINE 10 MG: 10 TABLET ORAL at 09:06

## 2022-06-17 RX ADMIN — CEPHALEXIN 500 MG: 500 CAPSULE ORAL at 09:06

## 2022-06-17 RX ADMIN — ENOXAPARIN SODIUM 40 MG: 100 INJECTION SUBCUTANEOUS at 08:06

## 2022-06-17 RX ADMIN — OXYCODONE HYDROCHLORIDE AND ACETAMINOPHEN 500 MG: 500 TABLET ORAL at 09:06

## 2022-06-17 RX ADMIN — ASPIRIN 81 MG: 81 TABLET, COATED ORAL at 09:06

## 2022-06-17 RX ADMIN — ENOXAPARIN SODIUM 40 MG: 100 INJECTION SUBCUTANEOUS at 09:06

## 2022-06-17 RX ADMIN — TAMSULOSIN HYDROCHLORIDE 0.4 MG: 0.4 CAPSULE ORAL at 09:06

## 2022-06-17 RX ADMIN — LAMOTRIGINE 100 MG: 25 TABLET ORAL at 09:06

## 2022-06-17 RX ADMIN — FUROSEMIDE 20 MG: 20 TABLET ORAL at 09:06

## 2022-06-17 RX ADMIN — DONEPEZIL HYDROCHLORIDE 10 MG: 10 TABLET ORAL at 08:06

## 2022-06-17 RX ADMIN — CEPHALEXIN 500 MG: 500 CAPSULE ORAL at 03:06

## 2022-06-17 RX ADMIN — QUETIAPINE FUMARATE 200 MG: 200 TABLET ORAL at 08:06

## 2022-06-17 RX ADMIN — LAMOTRIGINE 100 MG: 25 TABLET ORAL at 08:06

## 2022-06-17 RX ADMIN — FLUOXETINE 20 MG: 20 CAPSULE ORAL at 09:06

## 2022-06-17 RX ADMIN — QUETIAPINE FUMARATE 100 MG: 25 TABLET ORAL at 09:06

## 2022-06-17 RX ADMIN — MEMANTINE 10 MG: 10 TABLET ORAL at 08:06

## 2022-06-17 NOTE — PLAN OF CARE
Problem: Adult Inpatient Plan of Care  Goal: Plan of Care Review  Outcome: Ongoing, Progressing  Goal: Absence of Hospital-Acquired Illness or Injury  Outcome: Ongoing, Progressing  Goal: Optimal Comfort and Wellbeing  Outcome: Ongoing, Progressing  Goal: Readiness for Transition of Care  Outcome: Ongoing, Progressing     Problem: Fall Injury Risk  Goal: Absence of Fall and Fall-Related Injury  Outcome: Ongoing, Progressing  Discharge orders backed out due wife requesting more equipment at home to care for patient.

## 2022-06-17 NOTE — SUBJECTIVE & OBJECTIVE
Interval History: No issues overnight. Alert and conversational this morning, however not responding to questions appropriately    Review of Systems   Unable to perform ROS: Mental status change   Objective:     Vital Signs (Most Recent):  Temp: 99 °F (37.2 °C) (06/17/22 0730)  Pulse: 68 (06/17/22 0730)  Resp: 14 (06/17/22 0730)  BP: (!) 167/78 (06/17/22 0730)  SpO2: (!) 93 % (06/17/22 0730)   Vital Signs (24h Range):  Temp:  [97.4 °F (36.3 °C)-99.8 °F (37.7 °C)] 99 °F (37.2 °C)  Pulse:  [68-80] 68  Resp:  [14-20] 14  SpO2:  [90 %-98 %] 93 %  BP: (114-167)/(56-78) 167/78     Weight: (!) 158.7 kg (349 lb 15.7 oz)  Body mass index is 50.22 kg/m².    Intake/Output Summary (Last 24 hours) at 6/17/2022 0835  Last data filed at 6/16/2022 1656  Gross per 24 hour   Intake 540 ml   Output --   Net 540 ml        Physical Exam  Constitutional:       General: He is not in acute distress.     Appearance: He is obese. He is not ill-appearing, toxic-appearing or diaphoretic.      Comments: Alert and able to follow some commands. Cooperative with physical exam   HENT:      Head: Normocephalic and atraumatic.      Nose: Nose normal.      Mouth/Throat:      Mouth: Mucous membranes are moist.   Eyes:      Extraocular Movements: Extraocular movements intact.      Pupils: Pupils are equal, round, and reactive to light.   Cardiovascular:      Rate and Rhythm: Normal rate and regular rhythm.      Pulses: Normal pulses.      Heart sounds: Normal heart sounds. No murmur heard.  Pulmonary:      Effort: Pulmonary effort is normal. No respiratory distress.      Breath sounds: Normal breath sounds. No wheezing or rales.   Abdominal:      General: Bowel sounds are normal. There is no distension.      Palpations: Abdomen is soft.      Tenderness: There is no abdominal tenderness.   Musculoskeletal:         General: No swelling or tenderness. Normal range of motion.      Cervical back: Normal range of motion.   Skin:     General: Skin is warm.       Coloration: Skin is not jaundiced.      Findings: Erythema (BL LL) present. No bruising.      Comments: Bilateral LE erythema consistent with stasis dermatitis,  worse on the left leg, appears stable from prior.    Neurological:      Mental Status: He is alert. Mental status is at baseline.      Comments: Knows name and that he is at a hospital, speech normal. At baseline this AM    Psychiatric:         Behavior: Behavior is not agitated, aggressive or combative.       Significant Labs: All pertinent labs within the past 24 hours have been reviewed.  Blood Culture: No results for input(s): LABBLOO in the last 48 hours.  CBC:   Recent Labs   Lab 06/16/22  0511 06/17/22  0426   WBC 7.72 6.41   HGB 12.7* 13.3*   HCT 40.0 41.7    201       CMP:   Recent Labs   Lab 06/16/22  0511 06/17/22  0426    141   K 3.8 3.7    107   CO2 26 22*   GLU 94 115*   BUN 24* 21   CREATININE 1.1 1.0   CALCIUM 8.9 8.7   PROT 6.0 6.1   ALBUMIN 2.5* 2.6*   BILITOT 0.4 0.3   ALKPHOS 86 85   AST 10 16   ALT 11 17   ANIONGAP 9 12   EGFRNONAA >60.0 >60.0         Significant Imaging: I have reviewed all pertinent imaging results/findings within the past 24 hours.
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
## 2022-06-17 NOTE — PLAN OF CARE
[facility] - [facility]      HOME HEALTH ORDERS  FACE TO FACE ENCOUNTER    Patient Name: Jake Louie  YOB: 1945    PCP: Artem Hood MD   PCP Address: [address] / [place] [address]  PCP Phone Number: [phone]  PCP Fax: [phone]    Encounter Date: 6/19/22    Admit to Home Health    Diagnoses:  Active Hospital Problems    Diagnosis  POA    *Encephalopathy [G93.40]  Yes    Delirium superimposed on dementia [F05]  Unknown    UTI (urinary tract infection) [N39.0]  Unknown    Dementia with behavioral disturbance [F03.91]  Yes    AAA (abdominal aortic aneurysm) [I71.4]  Yes    Prediabetes [R73.03]  Yes    Abrasion of anterior right lower leg [S80.811A]  Yes    Late onset Alzheimer's disease with behavioral disturbance [G30.1, F02.81]  Yes    Essential hypertension [I10]  Yes    Stasis edema of left lower extremity [I87.302]  Yes    JUICE (obstructive sleep apnea) [G47.33]  Yes    Depression [F32.A]  Yes    Hyperlipidemia [E78.5]  Yes      Resolved Hospital Problems   No resolved problems to display.       Follow Up Appointments:  Future Appointments   Date Time Provider Department Center   6/23/2022  1:00 PM Artem Hood MD [facility]       Allergies:Review of patient's allergies indicates:  No Known Allergies    Medications: Review discharge medications with patient and family and provide education.    Current Facility-Administered Medications   Medication Dose Route Frequency Provider Last Rate Last Admin    albuterol inhaler 2 puff  2 puff Inhalation Q6H PRN Pro Holcomb MD        ascorbic acid (vitamin C) tablet 500 mg  500 mg Oral Daily Pro Holcomb MD   500 mg at 06/17/22 0937    aspirin EC tablet 81 mg  81 mg Oral Daily Pro Holcomb MD   81 mg at 06/17/22 0937    atorvastatin tablet 10 mg  10 mg Oral Daily Pro Holcomb MD   10 mg at 06/17/22 0937    cephALEXin capsule 500 mg  500 mg Oral Q8H Candido Vital MD   500 mg at 06/17/22 152     dextrose 10% bolus 125 mL  12.5 g Intravenous PRN Pro Holcomb MD        dextrose 10% bolus 250 mL  25 g Intravenous PRN Pro Holcomb MD        donepeziL tablet 10 mg  10 mg Oral QHS Pro Holcomb MD   10 mg at 06/16/22 2034    enoxaparin injection 40 mg  40 mg Subcutaneous Q12H Pro Holcomb MD   40 mg at 06/17/22 0937    FLUoxetine capsule 20 mg  20 mg Oral BID Pro Holcomb MD   20 mg at 06/17/22 0937    furosemide tablet 20 mg  20 mg Oral Daily Pro Holcomb MD   20 mg at 06/17/22 0937    glucagon (human recombinant) injection 1 mg  1 mg Intramuscular PRN Pro Holcomb MD        glucose chewable tablet 16 g  16 g Oral PRN Pro Holcomb MD        glucose chewable tablet 24 g  24 g Oral PRN Pro Holcomb MD        insulin aspart U-100 pen 0-5 Units  0-5 Units Subcutaneous QID (AC + HS) PRN Pro Holcomb MD        lamoTRIgine tablet 100 mg  100 mg Oral BID Pro Holcomb MD   100 mg at 06/17/22 0936    LIDOcaine 5 % patch 1 patch  1 patch Transdermal Daily PRN Pro Holcomb MD        lisinopriL tablet 20 mg  20 mg Oral BID Pro Holcomb MD   20 mg at 06/17/22 0937    memantine tablet 10 mg  10 mg Oral BID Pro Holcomb MD   10 mg at 06/17/22 0937    naloxone 0.4 mg/mL injection 0.02 mg  0.02 mg Intravenous PRN Pro Holcomb MD        olanzapine zydis disintegrating tablet 5 mg  5 mg Oral BID PRN Kendall Maxwell MD        QUEtiapine tablet 100 mg  100 mg Oral Daily Pro Holcomb MD   100 mg at 06/17/22 0937    QUEtiapine tablet 100 mg  100 mg Oral Daily Kendall Maxwell MD   100 mg at 06/17/22 1526    QUEtiapine tablet 200 mg  200 mg Oral Nightly Pro Holcomb MD   200 mg at 06/16/22 2034    sodium chloride 0.9% flush 10 mL  10 mL Intravenous Q12H PRN Pro Holcomb MD        tamsulosin 24 hr capsule 0.4 mg  0.4 mg Oral Daily Pro Holcomb MD   0.4 mg at 06/17/22 0937    vitamin D 1000 units tablet 1,000 Units  1,000 Units Oral Daily Pro Holcomb MD   1,000 Units at 06/17/22 0937     Current Discharge Medication  List      START taking these medications    Details   cephALEXin (KEFLEX) 500 MG capsule Take 1 capsule (500 mg total) by mouth every 8 (eight) hours. for 2 days  Qty: 6 capsule, Refills: 0         CONTINUE these medications which have CHANGED    Details   vitamin D (VITAMIN D3) 1000 units Tab Take 1 tablet (1,000 Units total) by mouth once daily.  Qty: 30 tablet, Refills: 0         CONTINUE these medications which have NOT CHANGED    Details   acetaminophen (TYLENOL) 500 MG tablet Take 1,000 mg by mouth 2 (two) times a day.      albuterol (PROVENTIL/VENTOLIN HFA) 90 mcg/actuation inhaler Inhale 2 puffs into the lungs every 6 (six) hours as needed for Wheezing.  Qty: 8 g, Refills: 0      ascorbic acid (VITAMIN C) 500 MG tablet Take 500 mg by mouth once daily.      aspirin (ECOTRIN) 81 MG EC tablet Take 81 mg by mouth once daily.      atorvastatin (LIPITOR) 10 MG tablet TAKE 1 TABLET(10 MG) BY MOUTH EVERY DAY  Qty: 90 tablet, Refills: 1      donepeziL (ARICEPT) 10 MG tablet TAKE 1 TABLET(10 MG) BY MOUTH EVERY EVENING  Qty: 90 tablet, Refills: 1      fluocinonide (LIDEX) 0.05 % external solution Use hs for scalp  Qty: 60 mL, Refills: 6    Associated Diagnoses: Folliculitis      FLUoxetine 20 MG capsule TAKE 1 CAPSULE(20 MG) BY MOUTH TWICE DAILY  Qty: 60 capsule, Refills: 5      furosemide (LASIX) 20 MG tablet Take 1 tablet (20 mg total) by mouth once daily.  Qty: 90 tablet, Refills: 1      lamoTRIgine (LAMICTAL) 100 MG tablet Take 1 tablet (100 mg total) by mouth 2 (two) times daily.  Qty: 180 tablet, Refills: 1      LIDOcaine (LIDODERM) 5 % Place 1 patch onto the skin daily as needed (neck pain). Remove & Discard patch within 12 hours or as directed by MD  Qty: 15 patch, Refills: 0      lisinopriL (PRINIVIL,ZESTRIL) 20 MG tablet Take 1 tablet (20 mg total) by mouth 2 (two) times daily.  Qty: 180 tablet, Refills: 3    Comments: .      memantine (NAMENDA) 10 MG Tab Take 1 tablet (10 mg total) by mouth 2 (two) times a  day.  Qty: 180 tablet, Refills: 1    Associated Diagnoses: Alzheimer's dementia without behavioral disturbance, unspecified timing of dementia onset      multivitamin (THERAGRAN) per tablet Take 1 tablet by mouth once daily.      mupirocin (BACTROBAN) 2 % ointment Apply topically 2 (two) times daily.  Qty: 30 g, Refills: 1      !! QUEtiapine (SEROQUEL) 100 MG Tab TAKE 1 TABLET BY MOUTH EVERY DAY AT 9 AM AND AT 3 PM  Qty: 60 tablet, Refills: 5      !! QUEtiapine (SEROQUEL) 200 MG Tab Take 1 tablet (200 mg total) by mouth nightly.  Qty: 30 tablet, Refills: 5      sodium chloride (SALINE NASAL) 0.65 % nasal spray 1 spray by Nasal route daily as needed for Congestion.      tamsulosin (FLOMAX) 0.4 mg Cap TAKE 1 CAPSULE(0.4 MG) BY MOUTH EVERY DAY  Qty: 30 capsule, Refills: 11      tiZANidine (ZANAFLEX) 4 MG tablet Take 1 tablet (4 mg total) by mouth every 8 (eight) hours as needed.  Qty: 21 tablet, Refills: 0       !! - Potential duplicate medications found. Please discuss with provider.      STOP taking these medications       mirtazapine (REMERON) 30 MG tablet Comments:   Reason for Stopping:               I have seen and examined this patient within the last 30 days. My clinical findings that support the need for the home health skilled services and home bound status are the following:no   Weakness/numbness causing balance and gait disturbance due to Weakness/Debility making it taxing to leave home.     Diet:   regular diet    Referrals/ Consults  Physical Therapy to evaluate and treat. Evaluate for home safety and equipment needs; Establish/upgrade home exercise program. Perform / instruct on therapeutic exercises, gait training, transfer training, and Range of Motion.  Occupational Therapy to evaluate and treat. Evaluate home environment for safety and equipment needs. Perform/Instruct on transfers, ADL training, ROM, and therapeutic exercises.    Activities:   activity as tolerated    Nursing:   Agency to admit  patient within 24 hours of hospital discharge unless specified on physician order or at patient request    SN to complete comprehensive assessment including routine vital signs. Instruct on disease process and s/s of complications to report to MD. Review/verify medication list sent home with the patient at time of discharge  and instruct patient/caregiver as needed. Frequency may be adjusted depending on start of care date.     Skilled nurse to perform up to 3 visits PRN for symptoms related to diagnosis    Notify MD if SBP > 160 or < 90; DBP > 90 or < 50; HR > 120 or < 50; Temp > 101; O2 < 88%; Other:       Ok to schedule additional visits based on staff availability and patient request on consecutive days within the home health episode.    When multiple disciplines ordered:    Start of Care occurs on Sunday - Wednesday schedule remaining discipline evaluations as ordered on separate consecutive days following the start of care.    Thursday SOC -schedule subsequent evaluations Friday and Monday the following week.     Friday - Saturday SOC - schedule subsequent discipline evaluations on consecutive days starting Monday of the following week.    For all post-discharge communication and subsequent orders please contact patient's primary care physician. If unable to reach primary care physician or do not receive response within 30 minutes, please contact [phone] for clinical staff order clarification    Home Health Aide:  Physical Therapy Three times weekly, Occupational Therapy Three times weekly and Home Health Aide Daily    Wound Care Orders  no    I certify that this patient is confined to his home and needs intermittent skilled nursing care, physical therapy and occupational therapy.
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
## 2022-06-17 NOTE — CONSULTS
[facility] - [facility]urg  Wound Care    Patient Name:  Jake Louie   MRN:  [id number]  Date: 6/17/2022  Diagnosis: Encephalopathy    History:     Past Medical History:   Diagnosis Date    Alzheimer's dementia     Depression     HEARING LOSS     Hyperlipidemia     Hypertension     JUICE (obstructive sleep apnea)     Personal history of colonic polyps     Prediabetes     Restless legs syndrome (RLS)     Sleep apnea        Social History     Socioeconomic History    Marital status:    Occupational History     Employer: OTHER   Tobacco Use    Smoking status: Current Every Day Smoker     Packs/day: 0.50     Years: 50.00     Pack years: 25.00    Smokeless tobacco: Never Used   Substance and Sexual Activity    Alcohol use: No    Drug use: No       Precautions:     Allergies as of 06/14/2022    (No Known Allergies)       [facility] Assessment Details/Treatment   Wound care consult for BLE  The right leg has partial thickness tissue losses that are pink, red, and dry. The left leg appears to have cellulitis. It is pink, dry and scaley. Sween cream applied.     Plan:  BLE - Nursing to cleanse with NS or wound cleanser, pat dry and apply sween cream (pink top) daily     Recommendations made to primary team for above plan via secure chat. Orders placed. Wound care signing off, re-consult as needed.      06/17/22 0819        Altered Skin Integrity 06/17/22 0819 Right anterior;lower Leg Non pressure chronic ulcer   Date First Assessed/Time First Assessed: 06/17/22 0819   Altered Skin Integrity Present on Admission: yes  Side: Right  Orientation: anterior;lower  Location: Leg  Primary Wound Type: Non pressure chronic ulcer   Wound Image    Dressing Appearance Open to air   Drainage Amount None   Appearance Pink;Red;Purple;Dry   Tissue loss description Partial thickness   Periwound Area Intact;Pink;Warm   Care Cleansed with:;Sterile normal saline   Dressing Applied;Other (comment)  (sween cream)        Altered Skin  Integrity 06/17/22 0819 Left anterior;lower Leg Other (comment)   Date First Assessed/Time First Assessed: 06/17/22 0819   Altered Skin Integrity Present on Admission: yes  Side: Left  Orientation: anterior;lower  Location: Leg  Primary Wound Type: (c) Other (comment)   Wound Image    Dressing Appearance Open to air   Drainage Amount None   Appearance Pink;Dry   Tissue loss description Not applicable   Care Cleansed with:;Sterile normal saline   Dressing Applied;Other (comment)  (sween cream)     06/17/2022

## 2022-06-17 NOTE — ASSESSMENT & PLAN NOTE
- Continue home donepezil, lamotrigine  - stop mirtazapine per psych  - psych recommended quetiapine 100mg morning and afternoon, 200mg at night to help prevent agitation  - F/u daily ECG for QTc monitoring
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
## 2022-06-17 NOTE — ASSESSMENT & PLAN NOTE
Jake Louie is a 76 year old male with severe alzheimer's dementia, Bipolar disorder, HLD, HTN, JUICE on nightly CPAP, RLS , prediabetes, chronic venous insufficiency, AAA s/p EVAR who was brought in by his spouse for altered mental status.     No PRNs overnight. Today patient appears more cooperative but very hard of hearing so difficult for him to cooperate with interview. Did tell us that he believes himself to be at home but aware he had not seen his wife today.     Recommendations:  1. Continue lamictal, donepezil, prozac, memantine    2. quetiapine 100 mg at 15:00 in addition to quetiapine 100 mg in morning and 200 mg qhs, in an effort to prevent evening agitation    3. Olanzapine 5 mg PRN for agitation    · DELIRIUM BEHAVIOR MANAGEMENT  · PLEASE utilize CHEMICAL restraints with PRN meds first for agitation. Minimize use of PHYSICAL restraints OR have periods of being out of physical restraints if possible.  · Keep window shades open and room lit during day and room dim at night in order to promote normal sleep-wake cycles  · Encourage family at bedside. [place] patient often to situation, location, date.  · Continue to Limit or Discontinue use of Narcotics, Benzos and Anti-cholinergic medications as they may worsen delirium.  · Continue medical workup for causative etiology of Delirium.

## 2022-06-17 NOTE — ASSESSMENT & PLAN NOTE
ecoli on urine cultures, pan-sensitive  - continue ctx  - will discharge with keflex to complete 7 day course

## 2022-06-17 NOTE — ASSESSMENT & PLAN NOTE
Jake Louie is a 76 year old male with severe alzheimer's dementia, HLD, HTN, JUICE on nightly CPAP, RLS , prediabetes, chronic venous insufficiency, AAA s/p EVAR who was brought in by his spouse for altered mental status.   CTH and CXR unremarkable.   Labs are largely unremarkable, besides leukocytosis. UA with 1+ leuks, WBC 12, rare bacteria. BNP, lactate, troponin wnl.  Suspect AMS could be related to behavioral disturbance in the setting of severe dementia vs UTI (though UA not convincing, but given urinary frequency, slight suprapubic tenderness and leukocytosis)    - Likely 2/2 behavioral disturbance in the setting of severe dementia vs UTI. Had agitation 2/2 delirium. Psych consulted with adjustment of antipsychotics. Agitation improved. Scheduled Seroquel TID, zyprexa prn   - cont UTI treatment
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
## 2022-06-17 NOTE — SUBJECTIVE & OBJECTIVE
"    Family History       Problem Relation (Age of Onset)    Arthritis Mother    Colon cancer Brother    Hearing loss Mother    Hypertension Mother, Brother, Brother, Brother    Parkinsonism Father    Pneumonia Mother          Tobacco Use    Smoking status: Current Every Day Smoker     Packs/day: 0.50     Years: 50.00     Pack years: 25.00    Smokeless tobacco: Never Used   Substance and Sexual Activity    Alcohol use: No    Drug use: No    Sexual activity: Not on file     Psychotherapeutics (From admission, onward)                Start     Stop Route Frequency Ordered    06/17/22 1500  QUEtiapine tablet 100 mg         -- Oral Daily 06/16/22 1828 06/16/22 1928  olanzapine zydis disintegrating tablet 5 mg         -- Oral 2 times daily PRN 06/16/22 1828    06/15/22 2100  QUEtiapine tablet 200 mg         -- Oral Nightly 06/1946    06/15/22 0900  QUEtiapine tablet 100 mg         -- Oral Daily 06/1946 06/14/22 2100  FLUoxetine capsule 20 mg         -- Oral 2 times daily 06/1946             Review of Systems  Objective:     Vital Signs (Most Recent):  Temp: 99 °F (37.2 °C) (06/17/22 0730)  Pulse: 68 (06/17/22 0730)  Resp: 14 (06/17/22 0730)  BP: (!) 167/78 (06/17/22 0730)  SpO2: (!) 93 % (06/17/22 0730)   Vital Signs (24h Range):  Temp:  [97.4 °F (36.3 °C)-99.8 °F (37.7 °C)] 99 °F (37.2 °C)  Pulse:  [68-80] 68  Resp:  [14-20] 14  SpO2:  [90 %-98 %] 93 %  BP: (114-167)/(56-78) 167/78     Height: 5' 10" (177.8 cm)  Weight: (!) 158.7 kg (349 lb 15.7 oz)  Body mass index is 50.22 kg/m².      Intake/Output Summary (Last 24 hours) at 6/17/2022 1101  Last data filed at 6/16/2022 1656  Gross per 24 hour   Intake 360 ml   Output --   Net 360 ml       Physical Exam     Significant Labs: All pertinent labs within the past 24 hours have been reviewed.    Significant Imaging: I have reviewed all pertinent imaging results/findings within the past 24 hours.    Discharge Mental Status Exam  Appearance: lying in " bed, overweight  Behavior/Copperation: appears to have hard time hearing and is difficult to participate  Speech: normal tone, normal rate, normal pitch, loud  Mood:  confused  Affect: constricted  Thought Process: wandering  Thought Content: does not volunteer SI, HI, AVH  Orientation: patient unable to hear/answer but does not appear oriented, per nursing, patient not oriented  Memory: Unable to assess  Attention/Concentration:  poor  Cognition: TAY  Insight: poor  Judgement: limited

## 2022-06-17 NOTE — PLAN OF CARE
No acute changes noted from previous shift. Uneventful night. Safety maintained throughout shift.

## 2022-06-18 PROBLEM — R73.03 PREDIABETES: Chronic | Status: ACTIVE | Noted: 2022-06-14

## 2022-06-18 PROBLEM — F03.918 DEMENTIA WITH BEHAVIORAL DISTURBANCE: Chronic | Status: ACTIVE | Noted: 2022-06-14

## 2022-06-18 PROBLEM — F02.818 LATE ONSET ALZHEIMER'S DISEASE WITH BEHAVIORAL DISTURBANCE: Chronic | Status: ACTIVE | Noted: 2018-09-25

## 2022-06-18 PROBLEM — G30.1 LATE ONSET ALZHEIMER'S DISEASE WITH BEHAVIORAL DISTURBANCE: Chronic | Status: ACTIVE | Noted: 2018-09-25

## 2022-06-18 PROBLEM — I71.40 AAA (ABDOMINAL AORTIC ANEURYSM): Chronic | Status: ACTIVE | Noted: 2022-06-14

## 2022-06-18 LAB
POCT GLUCOSE: 102 MG/DL (ref 70–110)
POCT GLUCOSE: 116 MG/DL (ref 70–110)
POCT GLUCOSE: 118 MG/DL (ref 70–110)
POCT GLUCOSE: 90 MG/DL (ref 70–110)

## 2022-06-18 PROCEDURE — 99231 PR SUBSEQUENT HOSPITAL CARE,LEVL I: ICD-10-PCS | Mod: GC,,, | Performed by: STUDENT IN AN ORGANIZED HEALTH CARE EDUCATION/TRAINING PROGRAM

## 2022-06-18 PROCEDURE — 25000003 PHARM REV CODE 250: Performed by: STUDENT IN AN ORGANIZED HEALTH CARE EDUCATION/TRAINING PROGRAM

## 2022-06-18 PROCEDURE — 25000003 PHARM REV CODE 250: Performed by: INTERNAL MEDICINE

## 2022-06-18 PROCEDURE — 99900035 HC TECH TIME PER 15 MIN (STAT)

## 2022-06-18 PROCEDURE — 93010 EKG 12-LEAD: ICD-10-PCS | Mod: ,,, | Performed by: INTERNAL MEDICINE

## 2022-06-18 PROCEDURE — 93005 ELECTROCARDIOGRAM TRACING: CPT

## 2022-06-18 PROCEDURE — 94761 N-INVAS EAR/PLS OXIMETRY MLT: CPT

## 2022-06-18 PROCEDURE — 94660 CPAP INITIATION&MGMT: CPT

## 2022-06-18 PROCEDURE — 93010 ELECTROCARDIOGRAM REPORT: CPT | Mod: ,,, | Performed by: INTERNAL MEDICINE

## 2022-06-18 PROCEDURE — 11000001 HC ACUTE MED/SURG PRIVATE ROOM

## 2022-06-18 PROCEDURE — 63600175 PHARM REV CODE 636 W HCPCS: Performed by: STUDENT IN AN ORGANIZED HEALTH CARE EDUCATION/TRAINING PROGRAM

## 2022-06-18 PROCEDURE — 99231 SBSQ HOSP IP/OBS SF/LOW 25: CPT | Mod: GC,,, | Performed by: STUDENT IN AN ORGANIZED HEALTH CARE EDUCATION/TRAINING PROGRAM

## 2022-06-18 RX ADMIN — MEMANTINE 10 MG: 10 TABLET ORAL at 08:06

## 2022-06-18 RX ADMIN — LAMOTRIGINE 100 MG: 25 TABLET ORAL at 08:06

## 2022-06-18 RX ADMIN — QUETIAPINE FUMARATE 100 MG: 25 TABLET ORAL at 02:06

## 2022-06-18 RX ADMIN — LISINOPRIL 20 MG: 20 TABLET ORAL at 08:06

## 2022-06-18 RX ADMIN — OLANZAPINE 5 MG: 5 TABLET, ORALLY DISINTEGRATING ORAL at 09:06

## 2022-06-18 RX ADMIN — FLUOXETINE 20 MG: 20 CAPSULE ORAL at 08:06

## 2022-06-18 RX ADMIN — OXYCODONE HYDROCHLORIDE AND ACETAMINOPHEN 500 MG: 500 TABLET ORAL at 08:06

## 2022-06-18 RX ADMIN — ASPIRIN 81 MG: 81 TABLET, COATED ORAL at 08:06

## 2022-06-18 RX ADMIN — CEPHALEXIN 500 MG: 500 CAPSULE ORAL at 05:06

## 2022-06-18 RX ADMIN — ENOXAPARIN SODIUM 40 MG: 100 INJECTION SUBCUTANEOUS at 08:06

## 2022-06-18 RX ADMIN — ATORVASTATIN CALCIUM 10 MG: 10 TABLET, FILM COATED ORAL at 08:06

## 2022-06-18 RX ADMIN — QUETIAPINE FUMARATE 100 MG: 25 TABLET ORAL at 08:06

## 2022-06-18 RX ADMIN — CEPHALEXIN 500 MG: 500 CAPSULE ORAL at 02:06

## 2022-06-18 RX ADMIN — Medication 1000 UNITS: at 08:06

## 2022-06-18 RX ADMIN — LIDOCAINE 1 PATCH: 50 PATCH CUTANEOUS at 08:06

## 2022-06-18 RX ADMIN — FUROSEMIDE 20 MG: 20 TABLET ORAL at 08:06

## 2022-06-18 RX ADMIN — DONEPEZIL HYDROCHLORIDE 10 MG: 10 TABLET ORAL at 08:06

## 2022-06-18 RX ADMIN — LAMOTRIGINE 100 MG: 25 TABLET ORAL at 10:06

## 2022-06-18 RX ADMIN — TAMSULOSIN HYDROCHLORIDE 0.4 MG: 0.4 CAPSULE ORAL at 08:06

## 2022-06-18 RX ADMIN — CEPHALEXIN 500 MG: 500 CAPSULE ORAL at 09:06

## 2022-06-18 RX ADMIN — QUETIAPINE FUMARATE 200 MG: 200 TABLET ORAL at 08:06

## 2022-06-18 NOTE — ASSESSMENT & PLAN NOTE
ecoli on urine cultures, pan-sensitive  - initially on ctx, changed to keflex to complete 7 day total course abx

## 2022-06-18 NOTE — NURSING
"Pt asleep in bed, easy to arouse. Pt in DX4, tele monitor in place.  Pt can be aggressive, pushed lunch tray off table. Pt is truly demented.  MD notified of loss of IV access. MD aware.  Attempts to reorient unsuccessful, pt stated "happy new year"    Pt in bed in lowest setting , wheels locked, sr upx2, call light within reach.  "
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
## 2022-06-18 NOTE — PLAN OF CARE
06/18/22 1248   Post-Acute Status   Post-Acute Authorization Home Health  (Accepted to [facility])   Home Health Status Set-up Complete/Auth obtained   Discharge Delays   (RW at , Hospital bed to be delivered to home (confirmed by [facility]). Pt to d/c home after hosiptal be is delivered.)   Discharge Plan   Discharge Plan A Home Health;Home with family   Discharge Plan B Home with family       Mary Varghese LMSW  Case Management Social Worker   [facility] [address]

## 2022-06-18 NOTE — PLAN OF CARE
Patient sleept thoughout shift. No changes noted from previous shift. Safety maintained no fall will continue to monitor.

## 2022-06-19 LAB
BACTERIA BLD CULT: NORMAL
BACTERIA BLD CULT: NORMAL
POCT GLUCOSE: 104 MG/DL (ref 70–110)

## 2022-06-19 PROCEDURE — 11000001 HC ACUTE MED/SURG PRIVATE ROOM

## 2022-06-19 PROCEDURE — 99900035 HC TECH TIME PER 15 MIN (STAT)

## 2022-06-19 PROCEDURE — 99231 PR SUBSEQUENT HOSPITAL CARE,LEVL I: ICD-10-PCS | Mod: GC,,, | Performed by: STUDENT IN AN ORGANIZED HEALTH CARE EDUCATION/TRAINING PROGRAM

## 2022-06-19 PROCEDURE — 25000003 PHARM REV CODE 250: Performed by: INTERNAL MEDICINE

## 2022-06-19 PROCEDURE — 63600175 PHARM REV CODE 636 W HCPCS: Performed by: STUDENT IN AN ORGANIZED HEALTH CARE EDUCATION/TRAINING PROGRAM

## 2022-06-19 PROCEDURE — 27000221 HC OXYGEN, UP TO 24 HOURS

## 2022-06-19 PROCEDURE — 25000003 PHARM REV CODE 250: Performed by: STUDENT IN AN ORGANIZED HEALTH CARE EDUCATION/TRAINING PROGRAM

## 2022-06-19 PROCEDURE — 93010 EKG 12-LEAD: ICD-10-PCS | Mod: ,,, | Performed by: INTERNAL MEDICINE

## 2022-06-19 PROCEDURE — 99231 SBSQ HOSP IP/OBS SF/LOW 25: CPT | Mod: GC,,, | Performed by: STUDENT IN AN ORGANIZED HEALTH CARE EDUCATION/TRAINING PROGRAM

## 2022-06-19 PROCEDURE — 94761 N-INVAS EAR/PLS OXIMETRY MLT: CPT

## 2022-06-19 PROCEDURE — 93010 ELECTROCARDIOGRAM REPORT: CPT | Mod: ,,, | Performed by: INTERNAL MEDICINE

## 2022-06-19 PROCEDURE — 93005 ELECTROCARDIOGRAM TRACING: CPT

## 2022-06-19 RX ORDER — CEPHALEXIN 500 MG/1
500 CAPSULE ORAL EVERY 8 HOURS
Qty: 6 CAPSULE | Refills: 0 | Status: SHIPPED | OUTPATIENT
Start: 2022-06-19 | End: 2022-06-21

## 2022-06-19 RX ADMIN — MEMANTINE 10 MG: 10 TABLET ORAL at 10:06

## 2022-06-19 RX ADMIN — FLUOXETINE 20 MG: 20 CAPSULE ORAL at 08:06

## 2022-06-19 RX ADMIN — ATORVASTATIN CALCIUM 10 MG: 10 TABLET, FILM COATED ORAL at 10:06

## 2022-06-19 RX ADMIN — TAMSULOSIN HYDROCHLORIDE 0.4 MG: 0.4 CAPSULE ORAL at 10:06

## 2022-06-19 RX ADMIN — OXYCODONE HYDROCHLORIDE AND ACETAMINOPHEN 500 MG: 500 TABLET ORAL at 10:06

## 2022-06-19 RX ADMIN — Medication 1000 UNITS: at 10:06

## 2022-06-19 RX ADMIN — QUETIAPINE FUMARATE 100 MG: 25 TABLET ORAL at 02:06

## 2022-06-19 RX ADMIN — ENOXAPARIN SODIUM 40 MG: 100 INJECTION SUBCUTANEOUS at 08:06

## 2022-06-19 RX ADMIN — ENOXAPARIN SODIUM 40 MG: 100 INJECTION SUBCUTANEOUS at 10:06

## 2022-06-19 RX ADMIN — CEPHALEXIN 500 MG: 500 CAPSULE ORAL at 09:06

## 2022-06-19 RX ADMIN — LAMOTRIGINE 100 MG: 25 TABLET ORAL at 08:06

## 2022-06-19 RX ADMIN — LAMOTRIGINE 100 MG: 25 TABLET ORAL at 10:06

## 2022-06-19 RX ADMIN — MEMANTINE 10 MG: 10 TABLET ORAL at 08:06

## 2022-06-19 RX ADMIN — FUROSEMIDE 20 MG: 20 TABLET ORAL at 10:06

## 2022-06-19 RX ADMIN — CEPHALEXIN 500 MG: 500 CAPSULE ORAL at 02:06

## 2022-06-19 RX ADMIN — LISINOPRIL 20 MG: 20 TABLET ORAL at 08:06

## 2022-06-19 RX ADMIN — DONEPEZIL HYDROCHLORIDE 10 MG: 10 TABLET ORAL at 08:06

## 2022-06-19 RX ADMIN — LISINOPRIL 20 MG: 20 TABLET ORAL at 10:06

## 2022-06-19 RX ADMIN — QUETIAPINE FUMARATE 200 MG: 200 TABLET ORAL at 08:06

## 2022-06-19 RX ADMIN — ASPIRIN 81 MG: 81 TABLET, COATED ORAL at 10:06

## 2022-06-19 RX ADMIN — QUETIAPINE FUMARATE 100 MG: 25 TABLET ORAL at 10:06

## 2022-06-19 RX ADMIN — OLANZAPINE 5 MG: 5 TABLET, ORALLY DISINTEGRATING ORAL at 10:06

## 2022-06-19 RX ADMIN — CEPHALEXIN 500 MG: 500 CAPSULE ORAL at 06:06

## 2022-06-19 RX ADMIN — FLUOXETINE 20 MG: 20 CAPSULE ORAL at 10:06

## 2022-06-19 NOTE — RESPIRATORY THERAPY
RT went to place on Cpap for the night. Pt is altered and will not keep mask on. RT has Cpap on standby.   - - -
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
## 2022-06-19 NOTE — PLAN OF CARE
Problem: Adult Inpatient Plan of Care  Goal: Plan of Care Review  Outcome: Ongoing, Progressing  Goal: Absence of Hospital-Acquired Illness or Injury  Outcome: Ongoing, Progressing  Intervention: Identify and Manage Fall Risk  Flowsheets (Taken 6/19/2022 0429)  Safety Promotion/Fall Prevention:   assistive device/personal item within reach   bed alarm set   side rails raised x 3   instructed to call staff for mobility   /camera at bedside   nonskid shoes/socks when out of bed  Intervention: Prevent Skin Injury  Flowsheets (Taken 6/19/2022 0429)  Body Position: weight shifting  Intervention: Prevent and Manage VTE (Venous Thromboembolism) Risk  Flowsheets (Taken 6/19/2022 0429)  Activity Management: Rolling - L1  VTE Prevention/Management: bleeding precations maintained  Intervention: Prevent Infection  Flowsheets (Taken 6/19/2022 0429)  Infection Prevention:   hand hygiene promoted   rest/sleep promoted  Goal: Optimal Comfort and Wellbeing  Outcome: Ongoing, Progressing  Intervention: Monitor Pain and Promote Comfort  Flowsheets (Taken 6/19/2022 0429)  Pain Management Interventions:   quiet environment facilitated   care clustered  Intervention: Provide Person-Centered Care  Flowsheets (Taken 6/19/2022 0429)  Trust Relationship/Rapport:   care explained   choices provided   questions answered   questions encouraged   Pt disoriented x4, intermittently aggressive. Vital signs as charted. Safety measures in place, siderails up x3, bed alarm on, telesitter in place. Pt restless most of night. No PIV in place. Voiding via external cath, output charted. No falls or injuries at this time.

## 2022-06-19 NOTE — NURSING
Pt awake in bed, DOX4. Telemonitor in place, bed alarm set. Pt refuses BG testing. (pt aggressive). Will administer prn meds and attempt again.    Pt in bed in lowest setting, wheels locked, sr upx2, call light within reach. Telesitter in place, bed alarm set.

## 2022-06-19 NOTE — SUBJECTIVE & OBJECTIVE
Interval History: No issues overnight, intermittently aggressive but no PRNs used. Alert and conversational this morning, however not responding to questions appropriately    Review of Systems   Unable to perform ROS: Mental status change   Objective:     Vital Signs (Most Recent):  Temp: 97.4 °F (36.3 °C) (06/19/22 0423)  Pulse: 62 (06/19/22 0423)  Resp: 17 (06/19/22 0423)  BP: (!) 171/80 (06/19/22 0423)  SpO2: 95 % (06/19/22 0512)   Vital Signs (24h Range):  Temp:  [96.9 °F (36.1 °C)-98.7 °F (37.1 °C)] 97.4 °F (36.3 °C)  Pulse:  [62-76] 62  Resp:  [16-17] 17  SpO2:  [92 %-98 %] 95 %  BP: (142-171)/(70-80) 171/80     Weight: (!) 158.7 kg (349 lb 15.7 oz)  Body mass index is 50.22 kg/m².    Intake/Output Summary (Last 24 hours) at 6/19/2022 0928  Last data filed at 6/19/2022 0428  Gross per 24 hour   Intake --   Output 350 ml   Net -350 ml        Physical Exam  Constitutional:       General: He is not in acute distress.     Appearance: He is obese. He is not ill-appearing, toxic-appearing or diaphoretic.      Comments: Alert and able to follow some commands. Cooperative with physical exam   HENT:      Head: Normocephalic and atraumatic.      Nose: Nose normal.      Mouth/Throat:      Mouth: Mucous membranes are moist.   Eyes:      Extraocular Movements: Extraocular movements intact.      Pupils: Pupils are equal, round, and reactive to light.   Cardiovascular:      Rate and Rhythm: Normal rate and regular rhythm.      Pulses: Normal pulses.      Heart sounds: Normal heart sounds. No murmur heard.  Pulmonary:      Effort: Pulmonary effort is normal. No respiratory distress.      Breath sounds: Normal breath sounds. No wheezing or rales.   Abdominal:      General: Bowel sounds are normal. There is no distension.      Palpations: Abdomen is soft.      Tenderness: There is no abdominal tenderness.   Musculoskeletal:         General: No swelling or tenderness. Normal range of motion.      Cervical back: Normal range of  motion.   Skin:     General: Skin is warm.      Coloration: Skin is not jaundiced.      Findings: Erythema (BL LL) present. No bruising.      Comments: Bilateral LE erythema consistent with stasis dermatitis,  worse on the left leg, appears stable from prior.    Neurological:      Mental Status: He is alert. Mental status is at baseline.      Comments: Knows name and that he is at a hospital, speech normal. At baseline this AM    Psychiatric:         Behavior: Behavior is not agitated, aggressive or combative.       Significant Labs: All pertinent labs within the past 24 hours have been reviewed.  Blood Culture: No results for input(s): LABBLOO in the last 48 hours.  CBC:   No results for input(s): WBC, HGB, HCT, PLT in the last 48 hours.    CMP:   No results for input(s): NA, K, CL, CO2, GLU, BUN, CREATININE, CALCIUM, PROT, ALBUMIN, BILITOT, ALKPHOS, AST, ALT, ANIONGAP, EGFRNONAA in the last 48 hours.    Invalid input(s): ESTGF[place]      Significant Imaging: I have reviewed all pertinent imaging results/findings within the past 24 hours.

## 2022-06-19 NOTE — NURSING
Pt agitated and attempts to get out of bed continuously. Spouse called, spouse unable to come. Will cont to monitor.      Pt in bed in lowest setting, wheels locked, srupx2, call light within reach. Telesitter in place, bed alarm set.

## 2022-06-20 VITALS
HEIGHT: 70 IN | WEIGHT: 315 LBS | RESPIRATION RATE: 18 BRPM | HEART RATE: 79 BPM | SYSTOLIC BLOOD PRESSURE: 126 MMHG | TEMPERATURE: 98 F | DIASTOLIC BLOOD PRESSURE: 63 MMHG | OXYGEN SATURATION: 94 % | BODY MASS INDEX: 45.1 KG/M2

## 2022-06-20 LAB
POCT GLUCOSE: 91 MG/DL (ref 70–110)
POCT GLUCOSE: 93 MG/DL (ref 70–110)
POCT GLUCOSE: 94 MG/DL (ref 70–110)
POCT GLUCOSE: 98 MG/DL (ref 70–110)

## 2022-06-20 PROCEDURE — 99239 HOSP IP/OBS DSCHRG MGMT >30: CPT | Mod: GC,,, | Performed by: STUDENT IN AN ORGANIZED HEALTH CARE EDUCATION/TRAINING PROGRAM

## 2022-06-20 PROCEDURE — 93010 EKG 12-LEAD: ICD-10-PCS | Mod: ,,, | Performed by: INTERNAL MEDICINE

## 2022-06-20 PROCEDURE — 99900035 HC TECH TIME PER 15 MIN (STAT)

## 2022-06-20 PROCEDURE — 93010 ELECTROCARDIOGRAM REPORT: CPT | Mod: ,,, | Performed by: INTERNAL MEDICINE

## 2022-06-20 PROCEDURE — 27000221 HC OXYGEN, UP TO 24 HOURS

## 2022-06-20 PROCEDURE — 99239 PR HOSPITAL DISCHARGE DAY,>30 MIN: ICD-10-PCS | Mod: GC,,, | Performed by: STUDENT IN AN ORGANIZED HEALTH CARE EDUCATION/TRAINING PROGRAM

## 2022-06-20 PROCEDURE — 1111F PR DISCHARGE MEDS RECONCILED W/ CURRENT OUTPATIENT MED LIST: ICD-10-PCS | Mod: CPTII,GC,, | Performed by: STUDENT IN AN ORGANIZED HEALTH CARE EDUCATION/TRAINING PROGRAM

## 2022-06-20 PROCEDURE — 94660 CPAP INITIATION&MGMT: CPT

## 2022-06-20 PROCEDURE — 63600175 PHARM REV CODE 636 W HCPCS: Performed by: STUDENT IN AN ORGANIZED HEALTH CARE EDUCATION/TRAINING PROGRAM

## 2022-06-20 PROCEDURE — 1111F DSCHRG MED/CURRENT MED MERGE: CPT | Mod: CPTII,GC,, | Performed by: STUDENT IN AN ORGANIZED HEALTH CARE EDUCATION/TRAINING PROGRAM

## 2022-06-20 PROCEDURE — 25000003 PHARM REV CODE 250: Performed by: INTERNAL MEDICINE

## 2022-06-20 PROCEDURE — 25000003 PHARM REV CODE 250: Performed by: STUDENT IN AN ORGANIZED HEALTH CARE EDUCATION/TRAINING PROGRAM

## 2022-06-20 PROCEDURE — 93005 ELECTROCARDIOGRAM TRACING: CPT

## 2022-06-20 PROCEDURE — 94761 N-INVAS EAR/PLS OXIMETRY MLT: CPT

## 2022-06-20 RX ADMIN — FLUOXETINE 20 MG: 20 CAPSULE ORAL at 09:06

## 2022-06-20 RX ADMIN — MEMANTINE 10 MG: 10 TABLET ORAL at 09:06

## 2022-06-20 RX ADMIN — TAMSULOSIN HYDROCHLORIDE 0.4 MG: 0.4 CAPSULE ORAL at 09:06

## 2022-06-20 RX ADMIN — QUETIAPINE FUMARATE 100 MG: 25 TABLET ORAL at 09:06

## 2022-06-20 RX ADMIN — LAMOTRIGINE 100 MG: 25 TABLET ORAL at 09:06

## 2022-06-20 RX ADMIN — OXYCODONE HYDROCHLORIDE AND ACETAMINOPHEN 500 MG: 500 TABLET ORAL at 09:06

## 2022-06-20 RX ADMIN — ATORVASTATIN CALCIUM 10 MG: 10 TABLET, FILM COATED ORAL at 09:06

## 2022-06-20 RX ADMIN — CEPHALEXIN 500 MG: 500 CAPSULE ORAL at 06:06

## 2022-06-20 RX ADMIN — ENOXAPARIN SODIUM 40 MG: 100 INJECTION SUBCUTANEOUS at 09:06

## 2022-06-20 RX ADMIN — Medication 1000 UNITS: at 09:06

## 2022-06-20 RX ADMIN — FUROSEMIDE 20 MG: 20 TABLET ORAL at 09:06

## 2022-06-20 RX ADMIN — ASPIRIN 81 MG: 81 TABLET, COATED ORAL at 09:06

## 2022-06-20 RX ADMIN — LISINOPRIL 20 MG: 20 TABLET ORAL at 09:06

## 2022-06-20 NOTE — NURSING
Telesitter in place. Pt in bed, sleeping, easy to arouse.Pt DOx3. DC orders placed, transportation arraigned. Ambulance will pick him up. DC orders sent with pt.

## 2022-06-20 NOTE — ASSESSMENT & PLAN NOTE
Previously on metformin for prediabetes. A1c 6.3, 3 months ago.     - Diabetes diet  - POCT glucose ACHS  - Maintain -180   - OP close f/u with PCP

## 2022-06-20 NOTE — SUBJECTIVE & OBJECTIVE
"    Family History       Problem Relation (Age of Onset)    Arthritis Mother    Colon cancer Brother    Hearing loss Mother    Hypertension Mother, Brother, Brother, Brother    Parkinsonism Father    Pneumonia Mother          Tobacco Use    Smoking status: Current Every Day Smoker     Packs/day: 0.50     Years: 50.00     Pack years: 25.00    Smokeless tobacco: Never Used   Substance and Sexual Activity    Alcohol use: No    Drug use: No    Sexual activity: Not on file     Psychotherapeutics (From admission, onward)                Start     Stop Route Frequency Ordered    06/17/22 1500  QUEtiapine tablet 100 mg         -- Oral Daily 06/16/22 1828 06/16/22 1928  olanzapine zydis disintegrating tablet 5 mg         -- Oral 2 times daily PRN 06/16/22 1828    06/15/22 2100  QUEtiapine tablet 200 mg         -- Oral Nightly 06/1946    06/15/22 0900  QUEtiapine tablet 100 mg         -- Oral Daily 06/1946 06/14/22 2100  FLUoxetine capsule 20 mg         -- Oral 2 times daily 06/1946             Review of Systems  Objective:     Vital Signs (Most Recent):  Temp: 97 °F (36.1 °C) (06/20/22 1111)  Pulse: 66 (06/20/22 1111)  Resp: 18 (06/20/22 1111)  BP: 128/67 (06/20/22 1111)  SpO2: 99 % (06/20/22 1111) Vital Signs (24h Range):  Temp:  [96.5 °F (35.8 °C)-97.8 °F (36.6 °C)] 97 °F (36.1 °C)  Pulse:  [62-70] 66  Resp:  [14-20] 18  SpO2:  [96 %-99 %] 99 %  BP: (128-161)/(67-80) 128/67     Height: 5' 10" (177.8 cm)  Weight: (!) 158.7 kg (349 lb 15.7 oz)  Body mass index is 50.22 kg/m².    No intake or output data in the 24 hours ending 06/20/22 1159    Physical Exam     Significant Labs: All pertinent labs within the past 24 hours have been reviewed.    Significant Imaging: I have reviewed all pertinent imaging results/findings within the past 24 hours.    Mental Status Exam:  Appearance: age appropriate, neatly groomed, lying in bed, overweight  Behavior/Cooperation: cooperative  Speech: normal tone, normal " rate, normal pitch, normal volume  Mood: neutral  Affect: full mood congruent reactive  Thought Process:  difficult to assess due to hard of hearing  Thought Content:  difficult to assess due to hard of hearing    Orientation:  disoriented to place and date  Memory: Remote impaired and Recent impaired  Attention Span/Concentration: Easily distracted  Insight: poor  Judgment: poor

## 2022-06-20 NOTE — RESPIRATORY THERAPY
RAPID RESPONSE RESPIRATORY CHART CHECK       Chart check completed. Patient is using CPAP and order is placed. no concerns verbalized at this time, instructed to call [phone] for further concerns or assistance.

## 2022-06-20 NOTE — PLAN OF CARE
[facility]  Discharge Reassessment    Primary Care Provider: Artem Hood MD    Expected Discharge Date: 6/20/2022    Reassessment (most recent)     Discharge Reassessment - 06/20/22 0916        Discharge Reassessment    Assessment Type Discharge Planning Reassessment     Did the patient's condition or plan change since previous assessment? No     Discharge Plan discussed with: Spouse/sig other     Name(s) and Number(s) sha weeks     Discharge Plan A Home with family     Discharge Plan B Home with family     DME Needed Upon Discharge  walker, rolling;hospital bed     Discharge Barriers Identified Other (see comments)   bed delivery    Why the patient remains in the hospital Placement issues        Post-Acute Status    Hospital Resources/Appts/Education Provided Provided patient/caregiver with written discharge plan information;Appointments scheduled and added to AVS     Discharge Delays None known at this time               No issues overnight, intermittently aggressive but no PRNs used. Alert and conversational this morning, however not responding to questions appropriately       CM spoke with wife sha.  She is in agreement for discharge today.  Waiting on bed delivery.  Per DME, unsure of what the delivery schedule is, the  usually calls patient's wife when in route.  Will continue to followup to see when the bed is delivered so that I can setup ambulance to take patient home.     Ludmila Santos RN [facility] [phone]

## 2022-06-20 NOTE — NURSING
"BEDSIDE NURSING PROGRESS REPORT     Patient identified and verified as    Jake Louie is a 77 y.o. male who came to hospital with complaints of Fall (Trip and fall from home; hx dementia)    He was admitted on 6/14/2022    Principal Problem: Delirium superimposed on dementia   Secondary Diagnoses:  Active Hospital Problems    Diagnosis  POA    *Delirium superimposed on dementia [F05]  Yes    UTI (urinary tract infection) [N39.0]  Yes    Encephalopathy [G93.40]  Yes    Dementia with behavioral disturbance [F03.91]  Yes     Chronic    AAA (abdominal aortic aneurysm) [I71.4]  Yes     Chronic    Prediabetes [R73.03]  Yes     Chronic    Abrasion of anterior right lower leg [S80.811A]  Yes    Late onset Alzheimer's disease with behavioral disturbance [G30.1, F02.81]  Yes     Chronic    Essential hypertension [I10]  Yes     Chronic    Stasis edema of left lower extremity [I87.302]  Yes     Chronic    JUICE (obstructive sleep apnea) [G47.33]  Yes     Chronic    Depression [F32.A]  Yes     Chronic    Hyperlipidemia [E78.5]  Yes     Chronic      Resolved Hospital Problems   No resolved problems to display.        CODE STATUS: DNR      Review of patient's allergies indicates:  No Known Allergies    Past Medical History:   Diagnosis Date    Alzheimer's dementia     Depression     HEARING LOSS     Hyperlipidemia     Hypertension     JUICE (obstructive sleep apnea)     Personal history of colonic polyps     Prediabetes     Restless legs syndrome (RLS)     Sleep apnea        Significant Diagnostic Studies: XR CHEST AP PORTABLE  CT HEAD WITHOUT CONTRAST  __________    Last Vitals:  BP (!) 161/77   Pulse 70   Temp 97.8 °F (36.6 °C)   Resp 20   Ht 5' 10" (1.778 m)   Wt (!) 158.7 kg (349 lb 15.7 oz)   SpO2 99%   BMI 50.22 kg/m²    Temp:  [96.5 °F (35.8 °C)-97.8 °F (36.6 °C)]   Pulse:  [62-70]   Resp:  [14-20]   BP: (139-161)/(73-80)   SpO2:  [96 %-99 %]     Physical Exam:   BP (!) 161/77   Pulse 70   " "Temp 97.8 °F (36.6 °C)   Resp 20   Ht 5' 10" (1.778 m)   Wt (!) 158.7 kg (349 lb 15.7 oz)   SpO2 99%   BMI 50.22 kg/m²   General appearance: alert, appears stated age and cooperative  Lungs: diminished breath sounds bilaterally  Heart: regular rate and rhythm, S1, S2 normal, no murmur, click, rub or gallop  Abdomen: soft, non-tender; bowel sounds normal; no masses,  no organomegaly  Pulses: 2+ and symmetric  Neurologic: Grossly normal     Other Assessment findings documented in flow sheet.     Care plan reviewed and adjusted appropriately. Education provided and documented.     Significant labs to review:  CBC and Comprehensive Metabolic Panel  Lab Results   Component Value Date    WBC 6.41 06/17/2022    HGB 13.3 (L) 06/17/2022    HCT 41.7 06/17/2022    MCV 89 06/17/2022     06/17/2022       CMP  Sodium   Date Value Ref Range Status   06/17/2022 141 136 - 145 mmol/L Final     Potassium   Date Value Ref Range Status   06/17/2022 3.7 3.5 - 5.1 mmol/L Final     Chloride   Date Value Ref Range Status   06/17/2022 107 95 - 110 mmol/L Final     CO2   Date Value Ref Range Status   06/17/2022 22 (L) 23 - 29 mmol/L Final     Glucose   Date Value Ref Range Status   06/17/2022 115 (H) 70 - 110 mg/dL Final     BUN   Date Value Ref Range Status   06/17/2022 21 8 - 23 mg/dL Final     Creatinine   Date Value Ref Range Status   06/17/2022 1.0 0.5 - 1.4 mg/dL Final     Calcium   Date Value Ref Range Status   06/17/2022 8.7 8.7 - 10.5 mg/dL Final     Total Protein   Date Value Ref Range Status   06/17/2022 6.1 6.0 - 8.4 g/dL Final     Albumin   Date Value Ref Range Status   06/17/2022 2.6 (L) 3.5 - 5.2 g/dL Final     Total Bilirubin   Date Value Ref Range Status   06/17/2022 0.3 0.1 - 1.0 mg/dL Final     Comment:     For infants and newborns, interpretation of results should be based  on gestational age, weight and in agreement with clinical  observations.    Premature Infant recommended reference ranges:  Up to 24 " hours.............<8.0 mg/dL  Up to 48 hours............<12.0 mg/dL  3-5 days..................<15.0 mg/dL  6-29 days.................<15.0 mg/dL       Alkaline Phosphatase   Date Value Ref Range Status   06/17/2022 85 55 - 135 U/L Final     AST   Date Value Ref Range Status   06/17/2022 16 10 - 40 U/L Final     ALT   Date Value Ref Range Status   06/17/2022 17 10 - 44 U/L Final     Anion Gap   Date Value Ref Range Status   06/17/2022 12 8 - 16 mmol/L Final     eGFR if    Date Value Ref Range Status   06/17/2022 >60.0 >60 mL/min/1.73 m^2 Final     eGFR if non    Date Value Ref Range Status   06/17/2022 >60.0 >60 mL/min/1.73 m^2 Final     Comment:     Calculation used to obtain the estimated glomerular filtration  rate (eGFR) is the CKD-EPI equation.          VTE prophylaxis:   Anticoagulants:  enoxaparin/Lovenox    IV/ Ports/ Drains/ Wounds  Accesses Present:  Male External Urinary Catheter 06/14/22 1332 Medium (Active)   Collection Container Standard drainage bag 06/18/22 2054   Securement Method secured to top of thigh w/ adhesive device 06/18/22 2054   Skin no redness;no breakdown 06/18/22 2054   Tolerance no signs/symptoms of discomfort 06/18/22 2054   Output (mL) 200 mL 06/18/22 0800            Altered Skin Integrity 06/17/22 0819 Right anterior;lower Leg Non pressure chronic ulcer (Active)   Wound Image   06/17/22 0819   Dressing Appearance Open to air 06/18/22 2054   Drainage Amount None 06/18/22 2054   Appearance Pink;Red 06/18/22 2054   Tissue loss description Partial thickness 06/17/22 0819   Periwound Area Intact;[place];Warm 06/17/22 0819   Care Cleansed with:;Sterile normal saline 06/17/22 0819   Dressing Applied;Other (comment) 06/17/22 0819            Altered Skin Integrity 06/17/22 0819 Left anterior;lower Leg Other (comment) (Active)   Wound Image   06/17/22 0819   Dressing Appearance Open to air 06/18/22 2054   Drainage Amount None 06/18/22 2054   Appearance Pink;Dry  06/18/22 2054   Tissue loss description Not applicable 06/17/22 0819   Care Cleansed with:;Sterile normal saline 06/17/22 0819   Dressing Applied;Other (comment) 06/17/22 0819           Cardiac Monitoring: no;  Continuous pulse oximetry: yes    Goal for this shift:  Maintain safety.    PLAN:  1. D/C with home health skilled services and home bound status   2. Discharge Condition: good  3. Discharge Disposition: Discharge to home health skilled services and home bound status     Ronaldo Pack RN  [facility] - Med Surg

## 2022-06-20 NOTE — ASSESSMENT & PLAN NOTE
Jake Louie is a 76 year old male with severe alzheimer's dementia, Bipolar disorder, HLD, HTN, JUICE on nightly CPAP, RLS , prediabetes, chronic venous insufficiency, AAA s/p EVAR who was brought in by his spouse for altered mental status.     PRN zyprexa yesterday morning. Patient better groomed and cooperative today. Continues to be difficult to interview due to hard of hearing but is disoriented to location, believing he is at home.    Recommendations:  1. Continue lamictal, donepezil, prozac, memantine    2. quetiapine 100 mg at 15:00 in addition to quetiapine 100 mg in morning and 200 mg qhs, in an effort to prevent evening agitation    3. Okay to discharge on Olanzapine 5 mg daily PRN for agitation, do not give while sedated, do not give more than one a day    · DELIRIUM BEHAVIOR MANAGEMENT  · PLEASE utilize CHEMICAL restraints with PRN meds first for agitation. Minimize use of PHYSICAL restraints OR have periods of being out of physical restraints if possible.  · Keep window shades open and room lit during day and room dim at night in order to promote normal sleep-wake cycles  · Encourage family at bedside. [place] patient often to situation, location, date.  · Continue to Limit or Discontinue use of Narcotics, Benzos and Anti-cholinergic medications as they may worsen delirium.  · Continue medical workup for causative etiology of Delirium.    Thank you for the consult. Psych CL will sign off at this time.
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
## 2022-06-20 NOTE — SUBJECTIVE & OBJECTIVE
Interval History:   No acute events overnight. Pending discharge today.   All questions answered.     Review of Systems   Unable to perform ROS: Mental status change   Objective:     Vital Signs (Most Recent):  Temp: 97 °F (36.1 °C) (06/20/22 1111)  Pulse: 66 (06/20/22 1111)  Resp: 18 (06/20/22 1111)  BP: 128/67 (06/20/22 1111)  SpO2: 99 % (06/20/22 1111) Vital Signs (24h Range):  Temp:  [96.5 °F (35.8 °C)-97.8 °F (36.6 °C)] 97 °F (36.1 °C)  Pulse:  [62-70] 66  Resp:  [14-20] 18  SpO2:  [96 %-99 %] 99 %  BP: (128-161)/(67-80) 128/67     Weight: (!) 158.7 kg (349 lb 15.7 oz)  Body mass index is 50.22 kg/m².  No intake or output data in the 24 hours ending 06/20/22 1359   Physical Exam  Constitutional:       General: He is not in acute distress.     Appearance: He is obese. He is not ill-appearing, toxic-appearing or diaphoretic.      Comments: Alert and able to follow some commands. Cooperative with physical exam   HENT:      Head: Normocephalic and atraumatic.      Nose: Nose normal.      Mouth/Throat:      Mouth: Mucous membranes are moist.   Eyes:      Extraocular Movements: Extraocular movements intact.      Pupils: Pupils are equal, round, and reactive to light.   Cardiovascular:      Rate and Rhythm: Normal rate and regular rhythm.      Pulses: Normal pulses.      Heart sounds: Normal heart sounds. No murmur heard.  Pulmonary:      Effort: Pulmonary effort is normal. No respiratory distress.      Breath sounds: Normal breath sounds. No wheezing or rales.   Abdominal:      General: Bowel sounds are normal. There is no distension.      Palpations: Abdomen is soft.      Tenderness: There is no abdominal tenderness.   Musculoskeletal:         General: No swelling or tenderness. Normal range of motion.      Cervical back: Normal range of motion.   Skin:     General: Skin is warm.      Coloration: Skin is not jaundiced.      Findings: Erythema (BL LL) present. No bruising.      Comments: Bilateral LE erythema  consistent with stasis dermatitis,  worse on the left leg, appears stable from prior.    Neurological:      Mental Status: He is alert. Mental status is at baseline.      Comments: Knows name and that he is at a hospital, speech normal. At baseline this AM    Psychiatric:         Behavior: Behavior is not agitated, aggressive or combative.       Significant Labs: All pertinent labs within the past 24 hours have been reviewed.  A1C:   Recent Labs   Lab 03/17/22  1447   HGBA1C 6.3*     ABGs: No results for input(s): PH, PCO2, HCO3, POCSATURATED, BE, TOTALHB, COHB, METHB, O2HB, POCFIO2, PO2 in the last 48 hours.  Bilirubin:   Recent Labs   Lab 06/14/22  1520 06/15/22  0758 06/16/22  0511 06/17/22  0426   BILITOT 0.8 0.6 0.4 0.3     Blood Culture: No results for input(s): LABBLOO in the last 48 hours.  BMP: No results for input(s): GLU, NA, K, CL, CO2, BUN, CREATININE, CALCIUM, MG in the last 48 hours.  CBC: No results for input(s): WBC, HGB, HCT, PLT in the last 48 hours.  CMP: No results for input(s): NA, K, CL, CO2, GLU, BUN, CREATININE, CALCIUM, PROT, ALBUMIN, BILITOT, ALKPHOS, AST, ALT, ANIONGAP, EGFRNONAA in the last 48 hours.    Invalid input(s): ESTGFAFRICA  Cardiac Markers: No results for input(s): CKMB, MYOGLOBIN, BNP, TROPISTAT in the last 48 hours.  Coagulation: No results for input(s): PT, INR, APTT in the last 48 hours.  Lactic Acid: No results for input(s): LACTATE in the last 48 hours.  Lipase: No results for input(s): LIPASE in the last 48 hours.  Lipid Panel: No results for input(s): CHOL, HDL, LDLCALC, TRIG, CHOLHDL in the last 48 hours.  Magnesium: No results for input(s): MG in the last 48 hours.  Pathology Results  (Last 10 years)      None          POCT Glucose:   Recent Labs   Lab 06/19/22  1142 06/20/22  0712 06/20/22  1110   POCTGLUCOSE 104 98 94     Prealbumin: No results for input(s): PREALBUMIN in the last 48 hours.  Respiratory Culture: No results for input(s): GSRESP, RESPIRATORYC in the  last 48 hours.  Troponin: No results for input(s): TROPONINI in the last 48 hours.  TSH:   Recent Labs   Lab 06/14/22  1520   TSH 1.412     Urine Culture: No results for input(s): LABURIN in the last 48 hours.  Urine Studies: No results for input(s): COLORU, APPEARANCEUA, PHUR, SPECGRAV, PROTEINUA, GLUCUA, KETONESU, BILIRUBINUA, OCCULTUA, NITRITE, UROBILINOGEN, LEUKOCYTESUR, RBCUA, WBCUA, BACTERIA, SQUAMEPITHEL, HYALINECASTS in the last 48 hours.    Invalid input(s): GOGO    Significant Imaging: I have reviewed all pertinent imaging results/findings within the past 24 hours.

## 2022-06-20 NOTE — PLAN OF CARE
On-call CM contacted by Sari Louie stating that the patient's hospital bed has been delivered.    Ambulance stretcher requested with [facility] (ext [phone]) for 6 PM pickup time.

## 2022-06-20 NOTE — ASSESSMENT & PLAN NOTE
Jake Louie is a 76 year old male with severe alzheimer's dementia, HLD, HTN, JUICE on nightly CPAP, RLS , prediabetes, chronic venous insufficiency, AAA s/p EVAR who was brought in by his spouse for altered mental status.   CTH and CXR unremarkable.   Labs are largely unremarkable, besides leukocytosis. UA with 1+ leuks, WBC 12, rare bacteria. BNP, lactate, troponin wnl.  Suspect AMS could be related to behavioral disturbance in the setting of severe dementia vs UTI (though UA not convincing, but given urinary frequency, slight suprapubic tenderness and leukocytosis)     - Likely 2/2 behavioral disturbance in the setting of severe dementia vs UTI. Had agitation 2/2 delirium. Psych consulted with adjustment of antipsychotics. Agitation improved. Scheduled Seroquel TID, zyprexa prn   - cont Cephalexin to complete UTI treatment  course.

## 2022-06-20 NOTE — PLAN OF CARE
Per marlo, hospital bed will be delivered today for discharge.  [facility] has accepted patient, but they cannot come out till Wednesday. Notified patient's spouse.      DCP: [facility]    Ludmila Santos RN [facility] [phone]

## 2022-06-21 NOTE — PLAN OF CARE
[facility]  Discharge Final Note    Primary Care Provider: Artem Hood MD    Expected Discharge Date: 6/20/2022    Final Discharge Note (most recent)     Final Note - 06/21/22 0805        Final Note    Assessment Type Final Discharge Note     Anticipated Discharge Disposition Home-Health Care [facility]     Hospital Resources/Appts/Education Provided Provided patient/caregiver with written discharge plan information;Appointments scheduled and added to AVS        Post-Acute Status    Post-Acute Authorization Home Health     Home Health Status Set-up Complete/Auth obtained     Coverage [employer] managed medicare     Discharge Delays Home Medical Equipment (Insurance, Delivery)               Pt went home with home health with [facility].  Hospital bed delivered to home before dc.  Went home with ambulance.  Follow up appointment is made.     DCP: home health    Ludmila Santos RN [facility] [phone]  Important Message from Medicare  Important Message from Medicare regarding Discharge Appeal Rights: Given to patient/caregiver, Explained to patient/caregiver, Signed/date by patient/caregiver     Date IMM was signed: 06/20/22  Time IMM was signed: 1001    Contact Info     Artem Hood MD   Specialty: Internal Medicine   Relationship: PCP - General    [address]  [facility] [address]   Phone: [phone]       Next Steps: Follow up on 6/23/2022    Instructions: you have a follow-up appointment on June 23 at 1:00pm.  Thank you

## 2022-06-23 ENCOUNTER — PATIENT MESSAGE (OUTPATIENT)
Dept: INTERNAL MEDICINE | Facility: CLINIC | Age: 77
End: 2022-06-23
Payer: MEDICARE

## 2022-06-29 ENCOUNTER — PATIENT MESSAGE (OUTPATIENT)
Dept: INTERNAL MEDICINE | Facility: CLINIC | Age: 77
End: 2022-06-29
Payer: MEDICARE

## 2022-07-01 RX ORDER — AMLODIPINE BESYLATE 5 MG/1
TABLET ORAL
Qty: 90 TABLET | Refills: 1 | OUTPATIENT
Start: 2022-07-01

## 2022-07-01 RX ORDER — ATORVASTATIN CALCIUM 10 MG/1
TABLET, FILM COATED ORAL
Qty: 90 TABLET | Refills: 1 | Status: SHIPPED | OUTPATIENT
Start: 2022-07-01

## 2022-07-01 RX ORDER — DONEPEZIL HYDROCHLORIDE 10 MG/1
TABLET, FILM COATED ORAL
Qty: 90 TABLET | Refills: 1 | Status: SHIPPED | OUTPATIENT
Start: 2022-07-01

## 2022-07-01 NOTE — TELEPHONE ENCOUNTER
No new care gaps identified.  [facility] Embedded Care Gaps. Reference number: [id number]. 7/01/2022   1:36:39 PM CDT

## 2022-07-11 ENCOUNTER — PATIENT MESSAGE (OUTPATIENT)
Dept: INTERNAL MEDICINE | Facility: CLINIC | Age: 77
End: 2022-07-11
Payer: MEDICARE

## 2022-07-13 ENCOUNTER — PATIENT MESSAGE (OUTPATIENT)
Dept: INTERNAL MEDICINE | Facility: CLINIC | Age: 77
End: 2022-07-13
Payer: MEDICARE

## 2022-07-16 ENCOUNTER — PATIENT MESSAGE (OUTPATIENT)
Dept: INTERNAL MEDICINE | Facility: CLINIC | Age: 77
End: 2022-07-16
Payer: MEDICARE

## 2022-07-18 ENCOUNTER — PATIENT MESSAGE (OUTPATIENT)
Dept: INTERNAL MEDICINE | Facility: CLINIC | Age: 77
End: 2022-07-18
Payer: MEDICARE

## 2022-07-19 ENCOUNTER — PATIENT MESSAGE (OUTPATIENT)
Dept: RESEARCH | Facility: CLINIC | Age: 77
End: 2022-07-19
Payer: MEDICARE

## 2022-07-27 ENCOUNTER — EXTERNAL HOME HEALTH (OUTPATIENT)
Dept: HOME HEALTH SERVICES | Facility: HOSPITAL | Age: 77
End: 2022-07-27
Payer: MEDICARE

## 2022-08-01 RX ORDER — DONEPEZIL HYDROCHLORIDE 10 MG/1
10 TABLET, FILM COATED ORAL NIGHTLY
Qty: 90 TABLET | Refills: 1 | Status: CANCELLED | OUTPATIENT
Start: 2022-08-01

## 2022-08-01 NOTE — TELEPHONE ENCOUNTER
No new care gaps identified.  [facility] Embedded Care Gaps. Reference number: [id number]. 8/01/2022   2:32:12 PM CDT

## 2022-08-01 NOTE — TELEPHONE ENCOUNTER
No new care gaps identified.  [facility] Embedded Care Gaps. Reference number: [id number]. 8/01/2022   2:20:15 PM CDT

## 2022-08-03 RX ORDER — QUETIAPINE FUMARATE 200 MG/1
200 TABLET, FILM COATED ORAL NIGHTLY
Qty: 90 TABLET | Refills: 1 | Status: SHIPPED | OUTPATIENT
Start: 2022-08-03

## 2022-08-03 RX ORDER — FLUOXETINE HYDROCHLORIDE 20 MG/1
CAPSULE ORAL
Qty: 60 CAPSULE | Refills: 5 | OUTPATIENT
Start: 2022-08-03

## 2022-08-03 RX ORDER — FLUOXETINE HYDROCHLORIDE 20 MG/1
20 CAPSULE ORAL 2 TIMES DAILY
Qty: 180 CAPSULE | Refills: 1 | Status: SHIPPED | OUTPATIENT
Start: 2022-08-03

## 2022-08-03 RX ORDER — QUETIAPINE FUMARATE 200 MG/1
TABLET, FILM COATED ORAL
Qty: 30 TABLET | Refills: 5 | OUTPATIENT
Start: 2022-08-03

## 2022-08-04 ENCOUNTER — PATIENT MESSAGE (OUTPATIENT)
Dept: INTERNAL MEDICINE | Facility: CLINIC | Age: 77
End: 2022-08-04
Payer: MEDICARE

## 2022-12-16 ENCOUNTER — PATIENT MESSAGE (OUTPATIENT)
Dept: ADMINISTRATIVE | Facility: HOSPITAL | Age: 77
End: 2022-12-16
Payer: MEDICARE
